# Patient Record
Sex: FEMALE | Race: WHITE | NOT HISPANIC OR LATINO | Employment: FULL TIME | URBAN - METROPOLITAN AREA
[De-identification: names, ages, dates, MRNs, and addresses within clinical notes are randomized per-mention and may not be internally consistent; named-entity substitution may affect disease eponyms.]

---

## 2017-02-27 ENCOUNTER — GENERIC CONVERSION - ENCOUNTER (OUTPATIENT)
Dept: OTHER | Facility: OTHER | Age: 52
End: 2017-02-27

## 2017-05-01 ENCOUNTER — ALLSCRIPTS OFFICE VISIT (OUTPATIENT)
Dept: OTHER | Facility: OTHER | Age: 52
End: 2017-05-01

## 2017-10-31 ENCOUNTER — ALLSCRIPTS OFFICE VISIT (OUTPATIENT)
Dept: OTHER | Facility: OTHER | Age: 52
End: 2017-10-31

## 2017-10-31 ENCOUNTER — GENERIC CONVERSION - ENCOUNTER (OUTPATIENT)
Dept: OTHER | Facility: OTHER | Age: 52
End: 2017-10-31

## 2017-11-15 ENCOUNTER — ALLSCRIPTS OFFICE VISIT (OUTPATIENT)
Dept: OTHER | Facility: OTHER | Age: 52
End: 2017-11-15

## 2017-11-16 ENCOUNTER — GENERIC CONVERSION - ENCOUNTER (OUTPATIENT)
Dept: OTHER | Facility: OTHER | Age: 52
End: 2017-11-16

## 2017-11-16 LAB
A/G RATIO (HISTORICAL): 1.6 (ref 1.2–2.2)
ALBUMIN SERPL BCP-MCNC: 4.5 G/DL (ref 3.5–5.5)
ALP SERPL-CCNC: 85 IU/L (ref 39–117)
ALT SERPL W P-5'-P-CCNC: 33 IU/L (ref 0–32)
AST SERPL W P-5'-P-CCNC: 23 IU/L (ref 0–40)
BILIRUB SERPL-MCNC: 0.4 MG/DL (ref 0–1.2)
BUN SERPL-MCNC: 15 MG/DL (ref 6–24)
BUN/CREA RATIO (HISTORICAL): 22 (ref 9–23)
CALCIUM SERPL-MCNC: 9.8 MG/DL (ref 8.7–10.2)
CHLORIDE SERPL-SCNC: 98 MMOL/L (ref 96–106)
CHOLEST SERPL-MCNC: 200 MG/DL (ref 100–199)
CO2 SERPL-SCNC: 24 MMOL/L (ref 18–29)
CREAT SERPL-MCNC: 0.69 MG/DL (ref 0.57–1)
DEPRECATED RDW RBC AUTO: 13.7 % (ref 12.3–15.4)
EGFR AFRICAN AMERICAN (HISTORICAL): 116 ML/MIN/1.73
EGFR-AMERICAN CALC (HISTORICAL): 100 ML/MIN/1.73
GLUCOSE SERPL-MCNC: 123 MG/DL (ref 65–99)
HCT VFR BLD AUTO: 43.4 % (ref 34–46.6)
HDLC SERPL-MCNC: 59 MG/DL
HGB BLD-MCNC: 14.7 G/DL (ref 11.1–15.9)
INTERPRETATION (HISTORICAL): NORMAL
LDLC SERPL CALC-MCNC: 94 MG/DL (ref 0–99)
MCH RBC QN AUTO: 30.8 PG (ref 26.6–33)
MCHC RBC AUTO-ENTMCNC: 33.9 G/DL (ref 31.5–35.7)
MCV RBC AUTO: 91 FL (ref 79–97)
PLATELET # BLD AUTO: 355 X10E3/UL (ref 150–379)
POTASSIUM SERPL-SCNC: 4.6 MMOL/L (ref 3.5–5.2)
RBC (HISTORICAL): 4.78 X10E6/UL (ref 3.77–5.28)
SODIUM SERPL-SCNC: 137 MMOL/L (ref 134–144)
TOT. GLOBULIN, SERUM (HISTORICAL): 2.9 G/DL (ref 1.5–4.5)
TOTAL PROTEIN (HISTORICAL): 7.4 G/DL (ref 6–8.5)
TRIGL SERPL-MCNC: 237 MG/DL (ref 0–149)
WBC # BLD AUTO: 10.2 X10E3/UL (ref 3.4–10.8)

## 2017-11-16 NOTE — CONSULTS
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)1      1 Amended By: Isidro Reza; Nov 15 2017 4:10 PM EST    Plan     · Start: Meloxicam 7 5 MG Oral Tablet; TAKE 1 TABLET DAILY1    · Follow-up PRN Evaluation and Treatment  Follow-up  Status: Complete  Done:84Ehl2297 04:10PM1    · We recommend that you stretch your plantar fascia using a step or stair  Do this [id number] times in a row, 3 times a day , and hold the stretch for 10 seconds eachtime ; Status:Complete;   Done: 86AIN8450 04:10PM1    · Wear shoes that give your toes plenty of room ; Status:Complete;   Done: 19Myb588637:10PM1    · Orthotics B/L; Status:Complete;   Done: 87AJL6305 04:10PM1      1 Amended By: Isidro Reza; Nov 15 2017 4:10 PM EST    Discussion/Summary  The  patient1  ,  patient's family1  was counseled regarding1  diagnostic results1 ,-- instructions for management1 ,-- prognosis1 ,-- patient and family education1 ,-- risks and benefits of treatment options1 ,-- importance of compliance with treatment1   Patient is able to Self-Care1    Possible side effects of new medications were reviewed with the patient/guardian today1  The treatment plan was reviewed with the patient/guardian  The patient/guardian understands and agrees with the treatment plan1        1 Amended By: Isidro Reza; Nov 15 2017 4:11 PM EST    Chief Complaint  B/L foot/heel pain      History of Present Illness  HPI: Patient has bilateral heel pain  No history of trauma  Patient suffers from post static dyskinesia  She does have not have low back pain  1        1 Amended By: Isidro Reza; Nov 15 2017 4:08 PM EST    Review of Systems   Constitutional:1  No fever, no chills, feels well, no tiredness, no recent weight gain or weight loss1   Eyes:1  No complaints of eye pain, no red eyes, no eyesight problems, no discharge, no dry eyes, no itching of eyes1   ENT:1  no complaints of earache, no loss of hearing, no nose bleeds, no nasal discharge, no sore throat, no hoarseness1   Cardiovascular:1  No complaints of slow heart rate, no fast heart rate, no chest pain, no palpitations, no leg claudication, no lower extremity edema1   Respiratory:1  No complaints of shortness of breath, no wheezing, no cough, no SOB on exertion, no orthopnea, no PND1   Gastrointestinal:1  No complaints of abdominal pain, no constipation, no nausea or vomiting, no diarrhea, no bloody stools1   Genitourinary:1  No complaints of dysuria, no incontinence, no pelvic pain, no dysmenorrhea, no vaginal discharge or bleeding1   Musculoskeletal:1  No complaints of arthralgias, no myalgias, no joint swelling or stiffness, no limb pain or swelling1   Integumentary:1  No complaints of skin rash or lesions, no itching, no skin wounds, no breast pain or lump1   Neurological:1  No complaints of headache, no confusion, no convulsions, no numbness, no dizziness or fainting, no tingling, no limb weakness, no difficulty walking1   Psychiatric:1  Not suicidal, no sleep disturbance, no anxiety or depression, no change in personality, no emotional problems1   Endocrine:1  No complaints of proptosis, no hot flashes, no muscle weakness, no deepening of the voice, no feelings of weakness1   Hematologic/Lymphatic:1  No complaints of swollen glands, no swollen glands in the neck, does not bleed easily, does not bruise easily1         1 Amended By: Paul Boyd; Nov 15 2017 3:58 PM EST    Active Problems    1  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 99) (M12 9)  2  BMI 38 0-38 9,adult (V85 38) (Z68 38)  3  Colon cancer screening (V76 51) (Z12 11)  4  Decreased libido (799 81) (R68 82)  5   Encounter for colorectal cancer screening (V76 51) (Z12 11,Z12 12)  6  Encounter for PPD test (V74 1) (Z11 1)  7  Encounter for screening mammogram for malignant neoplasm of breast (V76 12) (Z12 31)  8  Hypercholesterolemia (272 0) (E78 00)  9  Impaired fasting glucose (790 21) (R73 01)  10  Need for DTaP vaccination (V06 1) (Z23)  11  Nephrolithiasis (592 0) (N20 0)  12  Nicotine dependence (305 1) (F17 200)  13  Obesity, Class II, BMI 35-39 9 (278 00) (E66 9)  14  Pelvic pain in female (625 9) (R10 2)  15  Well woman exam with routine gynecological exam (V72 31) (Z01 419)    Past Medical History   · Acute upper respiratory infection (465 9) (J06 9)   · History of Apnea (786 03) (R06 81)   · History of Arthralgia (719 40) (M25 50)   · History of Benign essential hypertension (401 1) (I10)   · History of Depression (311) (F32 9)   · History of Difficulty breathing (786 09) (R06 89)   · History of Dysmenorrhea (625 3) (N94 6)   · Exposure to potentially hazardous body fluids (V15 85) (Z77 21)   · History of Fatigue (780 79) (R53 83)   · History of acute bronchitis (V12 69) (Z87 09)   · History of Insomnia (780 52) (G47 00)   · History of Muscle weakness (generalized) (728 87) (M62 81)   · History of Screening for cardiovascular condition (V81 2) (Z13 6)   · History of Screening for diabetes mellitus (V77 1) (Z13 1)   · History of Screening for hyperlipidemia (V77 91) (Z13 220)   · History of Viral gastroenteritis (008 8) (A08 4)    Surgical History   · History of Appendectomy   · History of Tubal Ligation    Family History  Mother    · Family history of diabetes mellitus (V18 0) (Z83 3)   · Family history of Fibromyalgia  Father    · Family history of Hypertension  Grandfather    · Family history of stroke (V17 1) (Z82 3)    Social History   · Denied: History of Alcohol use   · Denied: History of Drug use   · Former smoker (V15 82) (B13 912)    Current Meds  1  No Reported Medications Recorded    Allergies  1  No Known Drug Allergies  2   No Known Environmental Allergies  3  No Known Food Allergies    Vitals   Recorded: 32PLU9894 03:48PM   Heart Rate 78   Respiration 17   Systolic 751   Diastolic 89   Height 5 ft 2 in   Weight 206 lb    BMI Calculated 37 68   BSA Calculated 1 94       Physical Exam  Left Foot: Appearance:  Normal except as noted:1  1  excessive pronation1 -- and-- pes planus1   Tenderness: 1  medial calcaneous1 -- and-- insertion of the plantar fascia1   Right Foot: Appearance:  Normal except as noted:1  1  excessive pronation1 -- and-- pes planus1   Tenderness:  None except the1  1  medial calcaneous1 -- and-- insertion of the plantar fascia1   Left Ankle: ROM:1  limited ROM in all planes1    Right Ankle: ROM:1  limited ROM in all planes1    Neurological Exam:1  Performed1   Light touch was1  intact bilaterally1   Vibratory sensation was1  intact bilaterally1   Response to monofilament test was1  intact bilaterally1   Deep tendon reflexes:1  patellar reflex present bilaterally1 -- and-- achilles reflex present bilaterally1   Vascular Exam:1  performed1  Dorsalis pedis pulses were1  present bilaterally1   Posterior tibial pulses were1  present bilaterally1   Elevation Pallor:1  absent bilaterally1   Dependence rubor was1  absent bilaterally1   Edema:1  none1   Toenails: All of the toenails were1  elongated1 ,-- hypertrophied1 -- and-- discolored1   Hyperkeratosis:1  present on both first toes1   Shoe Gear Evaluation:1  Performed ()7   Recommendation(s):1  custom inlays1 -- and-- athletic shoes1         1 Amended By: John Sy; Nov 15 2017 4:09 PM EST    Results/Data  I personally reviewed the films/images/results in the office today  My interpretation follows  1   X-ray Review1  Bilateral calcaneal spurring noted1         1 Amended By: John Sy; Nov 15 2017 4:09 PM EST    Procedure  X-rays taken  Bilateral heel trigger point injection done  1 25 cc of Kenalog 10 was injected without complication  Patient will stretch daily   She will be placed on Mobic   Her feet have been casted for custom molded foot orthotics1        1 Amended By: Davin Chu; Nov 15 2017 4:09 PM EST    Signatures   Electronically signed by : Ismael Bell DPM; Nov 15 2017  4:11PM EST                       (Author)

## 2017-11-17 ENCOUNTER — ALLSCRIPTS OFFICE VISIT (OUTPATIENT)
Dept: OTHER | Facility: OTHER | Age: 52
End: 2017-11-17

## 2017-11-17 ENCOUNTER — GENERIC CONVERSION - ENCOUNTER (OUTPATIENT)
Dept: OTHER | Facility: OTHER | Age: 52
End: 2017-11-17

## 2017-11-17 DIAGNOSIS — Z12.31 ENCOUNTER FOR SCREENING MAMMOGRAM FOR MALIGNANT NEOPLASM OF BREAST: ICD-10-CM

## 2017-11-18 LAB
T4 FREE SERPL-MCNC: 0.92 NG/DL (ref 0.82–1.77)
TESTOSTERONE TOTAL (HISTORICAL): 54 NG/DL (ref 3–41)
TSH SERPL DL<=0.05 MIU/L-ACNC: 3.17 UIU/ML (ref 0.45–4.5)

## 2017-11-18 NOTE — PROGRESS NOTES
Assessment    1  Impaired fasting glucose (790 21) (R73 01)  2  Hypercholesterolemia (272 0) (E78 00)  3  Obesity, Class II, BMI 35-39 9 (278 00) (E66 9)  4  Former smoker (V15 82) (G59 886)  5  Fatigue (780 79) (R53 83)  6  Hirsutism (704 1) (L68 0)  7  Screening mammogram, encounter for (V76 12) (Z12 31)1   8  Colon cancer screening (V76 51) (Z12 11)1      1 Amended By: Reese Beltrán ; Nov 17 2017 1:55 PM EST    Plan  Colon cancer screening    · COLONOSCOPY; Status:Active; Requested for:17Nov2017; 1   Fatigue    · (1) T4, FREE; Status:Active; Requested for:17Nov2017;    · (1) TSH; Status:Active; Requested for:17Nov2017; Hirsutism    · (1) DHEA; Status:Active; Requested for:17Nov2017;    · (1) TESTOSTERONE; Status:Active; Requested for:17Nov2017;   Impaired fasting glucose    · Start: MetFORMIN HCl - 500 MG Oral Tablet; Take 1 tablet daily   · Begin a limited exercise program ; Status:Complete;   Done: 46YIA8130   · We recommend that you bring your body mass index down to 26 ; Status:Complete;  Done: 66EAP0658   · Follow-up visit in 6 weeks Evaluation and Treatment  Follow-up  Status: Complete - Scheduling  Done: 24YFB4031 01:53PM1   Screening mammogram, encounter for    · * MAMMO SCREENING BILATERAL W CAD; Status:Hold For - Scheduling; Requestedfor:17Nov2017; 1      1 Amended By: Reese Beltrán ; Nov 17 2017 1:55 PM EST    Discussion/Summary    Impaired fasting glucose - fasting sugar is up to 123; metformin started- total cholesterol is down from 213 to 200 - unchanged  will work on diet and exercise  pressure is elevated due to anti-inflammatory will recheck pressure in 6 weeks  Chief Complaint  follow up for weight problems      History of Present Illness  She has been on an anti-inflammatory for gout  Active Problems    1  Acquired ankle/foot deformity (736 70) (M21 969)  2  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 99) (M12 9)  3  BMI 38 0-38 9,adult (V85 38) (Z68 38)  4   Colon cancer screening (V76 51) (Z12 11)  5  Decreased libido (799 81) (R68 82)  6  Difficulty walking (719 7) (R26 2)  7  Disorder of nail (703 9) (L60 9)  8  Encounter for colorectal cancer screening (V76 51) (Z12 11,Z12 12)  9  Encounter for screening mammogram for malignant neoplasm of breast (V76 12) (Z12 31)  10  Foot pain, bilateral (729 5) (M79 671,M79 672)  11  Hypercholesterolemia (272 0) (E78 00)  12  Impaired fasting glucose (790 21) (R73 01)  13  Nephrolithiasis (592 0) (N20 0)  14  Nicotine dependence (305 1) (F17 200)  15  Obesity, Class II, BMI 35-39 9 (278 00) (E66 9)  16  Pelvic pain in female (625 9) (R10 2)  17  Pes planus, congenital (754 61) (Q66 50)  18  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)  19  Well woman exam with routine gynecological exam (V72 31) (Z01 419)    Past Medical History  1  Acute upper respiratory infection (465 9) (J06 9)  2  History of Apnea (786 03) (R06 81)  3  History of Arthralgia (719 40) (M25 50)  4  History of Benign essential hypertension (401 1) (I10)  5  History of Depression (311) (F32 9)  6  History of Difficulty breathing (786 09) (R06 89)  7  History of Dysmenorrhea (625 3) (N94 6)  8  Exposure to potentially hazardous body fluids (V15 85) (Z77 21)  9  History of Fatigue (780 79) (R53 83)  10  History of acute bronchitis (V12 69) (Z87 09)  11  History of Insomnia (780 52) (G47 00)  12  History of Muscle weakness (generalized) (728 87) (M62 81)  13  History of Screening for cardiovascular condition (V81 2) (Z13 6)  14  History of Screening for diabetes mellitus (V77 1) (Z13 1)  15  History of Screening for hyperlipidemia (V77 91) (Z13 220)  16  History of Viral gastroenteritis (008 8) (A08 4)    Surgical History  1  History of Appendectomy  2  History of Tubal Ligation    Family History  Mother   1  Family history of diabetes mellitus (V18 0) (Z83 3)  2  Family history of Fibromyalgia  Father   3  Family history of Hypertension  Grandfather   4   Family history of stroke (V17 1) (Z82 3)    Social History   · Denied: History of Alcohol use   · Denied: History of Drug use   · Former smoker (V15 82) (V38 485)  The social history was reviewed and updated today  Current Meds  1  No Reported Medications Recorded    Allergies  1  No Known Drug Allergies  2  No Known Environmental Allergies  3  No Known Food Allergies    Vitals  Vital Signs    Recorded: 19RQN9593 01:44PM Recorded: 93NPX8306 01:11PM   Temperature  97 7 F   Heart Rate  84   Respiration  20   Systolic 463 980   Diastolic 85 913   Height  5 ft 2 in   Weight  206 lb    BMI Calculated  37 68   BSA Calculated  1 94       Physical Exam   Constitutional  General appearance: No acute distress, well appearing and well nourished  Ears, Nose, Mouth, and Throat  External inspection of ears and nose: Normal    Otoscopic examination: Tympanic membranes translucent with normal light reflex  Canals patent without erythema  Oropharynx: Normal with no erythema, edema, exudate or lesions  Pulmonary  Auscultation of lungs: Clear to auscultation  no rales or crackles were heard bilaterally  no rhonchi  no friction rub  no wheezing  Cardiovascular  Auscultation of heart: Normal rate and rhythm, normal S1 and S2, without murmurs     Musculoskeletal  Gait and station: Normal          Results/Data  (1) COMPREHENSIVE METABOLIC PANEL [id number] 60:87MX Caterina Mage     Test Name Result Flag Reference   Glucose, Serum 123 mg/dL H 65-99   BUN 15 mg/dL  6-24   Creatinine, Serum 0 69 mg/dL  0 57-1 00   BUN/Creatinine Ratio 22  9-23   Sodium, Serum 137 mmol/L  134-144   Potassium, Serum 4 6 mmol/L  3 5-5 2   Chloride, Serum 98 mmol/L     Carbon Dioxide, Total 24 mmol/L  18-29   Calcium, Serum 9 8 mg/dL  8 7-10 2   Protein, Total, Serum 7 4 g/dL  6 0-8 5   Albumin, Serum 4 5 g/dL  3 5-5 5   Globulin, Total 2 9 g/dL  1 5-4 5   A/G Ratio 1 6  1 2-2 2   Bilirubin, Total 0 4 mg/dL  0 0-1 2   Alkaline Phosphatase, S 85 IU/L     AST (SGOT) 23 IU/L 0-40   ALT (SGPT) 33 IU/L H 0-32   eGFR If NonAfricn Am 100 mL/min/1 73  >59   eGFR If Africn Am 116 mL/min/1 73  >59     (1) LIPID PANEL FASTING W DIRECT LDL REFLEX [id number] 07:08AM [facility]     Test Name Result Flag Reference   Cholesterol, Total 200 mg/dL H 100-199   Triglycerides 237 mg/dL H 0-149   HDL Cholesterol 59 mg/dL  >39   LDL Cholesterol Calc 94 mg/dL  0-99     (1) CBC/ PLT (NO DIFF) [id number] 07:08AM [facility]     Test Name Result Flag Reference   WBC 10 2 x10E3/uL  3 4-10 8   RBC 4 78 x10E6/uL  3 77-5 28   Hemoglobin 14 7 g/dL  11 1-15 9   Hematocrit 43 4 %  34 0-46  6   MCV 91 fL  79-97   MCH 30 8 pg  26 6-33 0   MCHC 33 9 g/dL  31 5-35 7   RDW 13 7 %  12 3-15 4   Platelets 496 [id number]/KB  150-379     (1) COMPREHENSIVE METABOLIC PANEL [id number] 06:68YL [facility]     Test Name Result Flag Reference   Glucose, Serum 123 mg/dL H 65-99   BUN 15 mg/dL  6-24   Creatinine, Serum 0 69 mg/dL  0 57-1 00   BUN/Creatinine Ratio 22  9-23   Sodium, Serum 137 mmol/L  134-144   Potassium, Serum 4 6 mmol/L  3 5-5 2   Chloride, Serum 98 mmol/L     Carbon Dioxide, Total 24 mmol/L  18-29   Calcium, Serum 9 8 mg/dL  8 7-10 2   Protein, Total, Serum 7 4 g/dL  6 0-8 5   Albumin, Serum 4 5 g/dL  3 5-5 5   Globulin, Total 2 9 g/dL  1 5-4 5   A/G Ratio 1 6  1 2-2 2   Bilirubin, Total 0 4 mg/dL  0 0-1 2   Alkaline Phosphatase, S 85 IU/L     AST (SGOT) 23 IU/L  0-40   ALT (SGPT) 33 IU/L H 0-32   eGFR If NonAfricn Am 100 mL/min/1 73  >59   eGFR If Africn Am 116 mL/min/1 73  >59     (1) LIPID PANEL FASTING W DIRECT LDL REFLEX [id number] 07:08AM [facility]     Test Name Result Flag Reference   Cholesterol, Total 200 mg/dL H 100-199   Triglycerides 237 mg/dL H 0-149   HDL Cholesterol 59 mg/dL  >39   LDL Cholesterol Calc 94 mg/dL  0-99     (1) CBC/ PLT (NO DIFF) [id number] 07:08AM [facility]     Test Name Result Flag Reference   WBC 10 2 x10E3/uL  3 4-10 8   RBC 4 78 x10E6/uL  3 77-5 28   Hemoglobin 14 7 g/dL  11 1-15 9   Hematocrit 43 4 %  34 0-46  6   MCV 91 fL  79-97   MCH 30 8 pg  26 6-33 0   MCHC 33 9 g/dL  31 5-35 7   RDW 13 7 %  12 3-15 4   Platelets 898 [id number]/QZ  150-379       Signatures   Electronically signed by : Demetra Matos DO; Nov 17 2017  1:52PM EST                       (Author)    Electronically signed by : Demetra Matos DO; Nov 17 2017  1:55PM EST                       (Author)

## 2017-11-21 ENCOUNTER — GENERIC CONVERSION - ENCOUNTER (OUTPATIENT)
Dept: OTHER | Facility: OTHER | Age: 52
End: 2017-11-21

## 2017-11-21 LAB — DHEA (HISTORICAL): 49 NG/DL (ref 31–701)

## 2017-12-28 ENCOUNTER — ALLSCRIPTS OFFICE VISIT (OUTPATIENT)
Dept: OTHER | Facility: OTHER | Age: 52
End: 2017-12-28

## 2017-12-29 NOTE — PROGRESS NOTES
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)   5  Achilles tendinitis of right lower extremity (726 71) (M76 61)    Plan    · Sulindac 200 MG Oral Tablet; TAKE 1 TABLET 2 TIMES DAILY AFTER MEALS   · Follow-up PRN Evaluation and Treatment  Follow-up  Status: Complete  Done:    67MBB2992 04:19PM   · We recommend that you stretch your plantar fascia using a step or stair  Do this exercise    10 times in a row, 3 times a day , and hold the stretch for 10 seconds each    time ; Status:Complete;   Done: 31DTL4347 04:19PM   · Orthotics B/L; Status:Complete;   Done: 62TVD0378 04:19PM   · Strapping ankle and or foot - POC; Status:Active - Perform Order; Requested    for:11Try3891;     Discussion/Summary   The patient was counseled regarding diagnostic results,-- instructions for management,-- prognosis,-- patient and family education,-- risks and benefits of treatment options,-- importance of compliance with treatment  Patient is able to Self-Care  Possible side effects of new medications were reviewed with the patient/guardian today  The treatment plan was reviewed with the patient/guardian  The patient/guardian understands and agrees with the treatment plan      Chief Complaint   Right foot pain      History of Present Illness   HPI: Patient has continued pain in her right heel only  Patient has post static dyskinesia  She now has pain in the back of the heel as well  No history of trauma      Review of Systems        Constitutional: No fever, no chills, feels well, no tiredness, no recent weight gain or weight loss  Eyes: No complaints of eye pain, no red eyes, no eyesight problems, no discharge, no dry eyes, no itching of eyes  ENT: no complaints of earache, no loss of hearing, no nose bleeds, no nasal discharge, no sore throat, no hoarseness  Cardiovascular: No complaints of slow heart rate, no fast heart rate, no chest pain, no palpitations, no leg claudication, no lower extremity edema  Respiratory: No complaints of shortness of breath, no wheezing, no cough, no SOB on exertion, no orthopnea, no PND  Gastrointestinal: No complaints of abdominal pain, no constipation, no nausea or vomiting, no diarrhea, no bloody stools  Genitourinary: No complaints of dysuria, no incontinence, no pelvic pain, no dysmenorrhea, no vaginal discharge or bleeding  Musculoskeletal: No complaints of arthralgias, no myalgias, no joint swelling or stiffness, no limb pain or swelling  Integumentary: No complaints of skin rash or lesions, no itching, no skin wounds, no breast pain or lump  Neurological: No complaints of headache, no confusion, no convulsions, no numbness, no dizziness or fainting, no tingling, no limb weakness, no difficulty walking  Psychiatric: Not suicidal, no sleep disturbance, no anxiety or depression, no change in personality, no emotional problems  Endocrine: No complaints of proptosis, no hot flashes, no muscle weakness, no deepening of the voice, no feelings of weakness  Hematologic/Lymphatic: No complaints of swollen glands, no swollen glands in the neck, does not bleed easily, does not bruise easily  Active Problems   1  Acquired ankle/foot deformity (736 70) (M21 969)   2  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 99) (M12 9)   3  BMI 38 0-38 9,adult (V85 38) (Z68 38)   4  Colon cancer screening (V76 51) (Z12 11)   5  Decreased libido (799 81) (R68 82)   6  Difficulty walking (719 7) (R26 2)   7  Disorder of nail (703 9) (L60 9)   8  Encounter for colorectal cancer screening (V76 51) (Z12 11,Z12 12)   9  Encounter for screening mammogram for malignant neoplasm of breast (V76 12)     (Z12 31)   10  Fatigue (780 79) (R53 83)   11  Foot pain, bilateral (729 5) (M79 671,M79 672)   12   Hirsutism (704 1) (L68 0) 13  Hypercholesterolemia (272 0) (E78 00)   14  Impaired fasting glucose (790 21) (R73 01)   15  Nephrolithiasis (592 0) (N20 0)   16  Nicotine dependence (305 1) (F17 200)   17  Obesity, Class II, BMI 35-39 9 (278 00) (E66 9)   18  Pelvic pain in female (625 9) (R10 2)   19  Pes planus, congenital (754 61) (Q66 50)   20  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)   21  Screening mammogram, encounter for (V76 12) (Z12 31)   22  Well woman exam with routine gynecological exam (V72 31) (Z01 419)    Past Medical History    · Acute upper respiratory infection (465 9) (J06 9)   · History of Apnea (786 03) (R06 81)   · History of Arthralgia (719 40) (M25 50)   · History of Benign essential hypertension (401 1) (I10)   · History of Depression (311) (F32 9)   · History of Difficulty breathing (786 09) (R06 89)   · History of Dysmenorrhea (625 3) (N94 6)   · Exposure to potentially hazardous body fluids (V15 85) (Z77 21)   · History of Fatigue (780 79) (R53 83)   · History of acute bronchitis (V12 69) (Z87 09)   · History of Insomnia (780 52) (G47 00)   · History of Muscle weakness (generalized) (728 87) (M62 81)   · History of Screening for cardiovascular condition (V81 2) (Z13 6)   · History of Screening for diabetes mellitus (V77 1) (Z13 1)   · History of Screening for hyperlipidemia (V77 91) (Z13 220)   · History of Viral gastroenteritis (008 8) (A08 4)    Surgical History    · History of Appendectomy   · History of Tubal Ligation    Family History   Mother    · Family history of diabetes mellitus (V18 0) (Z83 3)   · Family history of Fibromyalgia  Father    · Family history of Hypertension  Grandfather    · Family history of stroke (V17 1) (Z82 3)    Social History    · Denied: History of Alcohol use   · Denied: History of Drug use   · Former smoker (V15 82) (D15 437)    Current Meds    1  MetFORMIN HCl - 500 MG Oral Tablet; Take 1 tablet daily;      Therapy: 92THR3428 to (Evaluate:17Mar2018)  Requested for: 69EJA1692; Last Rx:15Jwv7573 Ordered    Allergies   1  No Known Drug Allergies  2  No Known Environmental Allergies   3  No Known Food Allergies    Vitals    Recorded: 90Aep6411 03:50PM   Respiration 17   Systolic 658   Diastolic 87   Height 5 ft 2 in   Weight 206 lb    BMI Calculated 37 68   BSA Calculated 1 94     Physical Exam   Left Foot: Appearance: Normal except as noted: excessive pronation-- and-- pes planus  Tenderness: medial calcaneous-- and-- insertion of the plantar fascia  Right Foot: Appearance: Normal except as noted: excessive pronation-- and-- pes planus  Tenderness: None except the medial calcaneous-- and-- insertion of the plantar fascia  Left Ankle: ROM: limited ROM in all planes    Right Ankle: Tenderness: None except the Achilles tendon insertion  ROM: limited ROM in all planes Motor: Normal     Neurological Exam: performed  Light touch was intact bilaterally  Vibratory sensation was intact bilaterally  Response to monofilament test was intact bilaterally  Deep tendon reflexes: patellar reflex present bilaterally-- and-- achilles reflex present bilaterally  Vascular Exam: performed Dorsalis pedis pulses were present bilaterally  Posterior tibial pulses were present bilaterally  Elevation Pallor: absent bilaterally  Dependence rubor was absent bilaterally  Edema: none  Toenails: All of the toenails were elongated,-- hypertrophied-- and-- discolored  Hyperkeratosis: present on both first toes  Shoe Gear Evaluation: performed ()  Recommendation(s): custom inlays-- and-- athletic shoes  Results/Data   I personally reviewed the films/images/results in the office today  My interpretation follows  X-ray Review Early calcaneal spurring noted  Both plantar and retrocalcaneal spurring noted of the right calcaneus  No evidence of fracture or bony mass  Procedure   X-rays taken  Right heel trigger point injection done   1 25 cc of Kenalog 10 was injected into right heel without complication  Patient was dispensed a night splint  Patient will stretch daily  We will add anti-inflammatory medicine        Future Appointments      Date/Time Provider Specialty Site   01/08/2018 06:15 PM Jenny Patel, [address] [place] [address]     Signatures    Electronically signed by : Marlin Meyer DPM; Dec 28 2017  4:20PM EST                       (Author)

## 2018-01-08 ENCOUNTER — GENERIC CONVERSION - ENCOUNTER (OUTPATIENT)
Dept: OTHER | Facility: OTHER | Age: 53
End: 2018-01-08

## 2018-01-09 ENCOUNTER — GENERIC CONVERSION - ENCOUNTER (OUTPATIENT)
Dept: OTHER | Facility: OTHER | Age: 53
End: 2018-01-09

## 2018-01-09 NOTE — RESULT NOTES
Verified Results  [facility]) Measles/Mumps/Rubella Immunity [id number] 07:32AM Adityamaryan Handy     Test Name Result Flag Reference   Rubella Antibodies, IgG 24 10 index  Immune >0 99   Non-immune       <0 90                                                 Equivocal  0 90 - 0 99                                                 Immune           >0 99   Rubeola Ab, IgG 96 5 AU/mL  Immune >29 9   Negative        <25 0                                                  Equivocal 25 0 - 29 9                                                  Positive        >29 9                 Presence of antibodies to Rubeola is presumptive evidence                 of immunity except when acute infection is suspected  Mumps Abs, IgG 9 1 AU/mL L Immune >10 9   A second sample should be collected and tested no less than 2-4 weeks  Negative         <9 0                                                 Equivocal  9 0 - 10 9                                                 Positive        >10 9                 A positive result generally indicates past exposure to                 Mumps virus or previous vaccination

## 2018-01-10 ENCOUNTER — TRANSCRIBE ORDERS (OUTPATIENT)
Dept: ADMINISTRATIVE | Facility: HOSPITAL | Age: 53
End: 2018-01-10

## 2018-01-10 DIAGNOSIS — R79.89 ELEVATED TESTOSTERONE LEVEL IN FEMALE: Primary | ICD-10-CM

## 2018-01-11 NOTE — RESULT NOTES
Verified Results  (1) COMPREHENSIVE METABOLIC PANEL [id number] 28:92MG Johana Brittly     Test Name Result Flag Reference   Glucose, Serum 109 mg/dL H 65-99   BUN 16 mg/dL  6-24   Creatinine, Serum 0 84 mg/dL  0 57-1 00   eGFR If NonAfricn Am 81 mL/min/1 73  >59   eGFR If Africn Am 93 mL/min/1 73  >59   BUN/Creatinine Ratio 19  9-23   Sodium, Serum 141 mmol/L  134-144   Potassium, Serum 4 5 mmol/L  3 5-5 2   Chloride, Serum 101 mmol/L     Carbon Dioxide, Total 22 mmol/L  18-29   Calcium, Serum 9 2 mg/dL  8 7-10 2   Protein, Total, Serum 7 3 g/dL  6 0-8 5   Albumin, Serum 4 2 g/dL  3 5-5 5   Globulin, Total 3 1 g/dL  1 5-4 5   A/G Ratio 1 4  1 1-2 5   Bilirubin, Total 0 4 mg/dL  0 0-1 2   Alkaline Phosphatase, S 76 IU/L     AST (SGOT) 18 IU/L  0-40   ALT (SGPT) 25 IU/L  0-32     (1) LIPID PANEL FASTING W DIRECT LDL REFLEX [id number] 07:32AM Volfabiana Velázquez     Test Name Result Flag Reference   Cholesterol, Total 213 mg/dL H 100-199   Triglycerides 186 mg/dL H 0-149   HDL Cholesterol 47 mg/dL  >39   According to ATP-III Guidelines, HDL-C >59 mg/dL is considered a  negative risk factor for CHD  LDL Cholesterol Calc 129 mg/dL H 0-99     [facility]) Cardiovascular Risk Assessment 06Jun2016 07:32AM Volfabiana [facility]     Test Name Result Flag Reference   Interpretation Note     Supplement report is available  PDF Image   Discussion/Summary   Darya,   Please make an appointment to discuss your lab work  Your blood sugar is elevated  It is in the pre-diabetic range      Dr Selam Lau

## 2018-01-12 NOTE — PROGRESS NOTES
Chief Complaint  PPD placement      Active Problems    1  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 99) (M12 9)   2  Colon cancer screening (V76 51) (Z12 11)   3  Decreased libido (799 81) (R68 82)   4  Encounter for screening mammogram for malignant neoplasm of breast (V76 12)   (Z12 31)   5  Hypercholesterolemia (272 0) (E78 00)   6  Impaired fasting glucose (790 21) (R73 01)   7  Nephrolithiasis (592 0) (N20 0)   8  Nicotine dependence (305 1) (F17 200)   9  Pelvic pain in female (625 9) (R10 2)   10  Well woman exam with routine gynecological exam (V72 31) (Z01 419)    Current Meds   1  No Reported Medications Recorded    Allergies    1  No Known Drug Allergies    2  No Known Environmental Allergies   3   No Known Food Allergies    Plan  Encounter for PPD test    · Tubersol 5 UNIT/0 1ML Intradermal Solution    Future Appointments    Date/Time Provider Specialty Site   11/09/2016 09:15 AM 1 Alyx Glez Nurse Mariela  [facility]     Signatures   Electronically signed by : Zoe Davalos DO; Nov 7 2016  9:07AM EST                       (Author)

## 2018-01-15 VITALS
BODY MASS INDEX: 37.91 KG/M2 | SYSTOLIC BLOOD PRESSURE: 147 MMHG | WEIGHT: 206 LBS | RESPIRATION RATE: 17 BRPM | DIASTOLIC BLOOD PRESSURE: 89 MMHG | HEIGHT: 62 IN | HEART RATE: 78 BPM

## 2018-01-15 VITALS
SYSTOLIC BLOOD PRESSURE: 152 MMHG | HEART RATE: 84 BPM | TEMPERATURE: 97.7 F | RESPIRATION RATE: 20 BRPM | BODY MASS INDEX: 37.91 KG/M2 | HEIGHT: 62 IN | WEIGHT: 206 LBS | DIASTOLIC BLOOD PRESSURE: 85 MMHG

## 2018-01-17 NOTE — RESULT NOTES
Discussion/Summary   Darya,   Here is a copy of the lab work we discussed  Dr Bella Morrow      Verified Results  (1) TSH [id number] 02:08PM [facility]     Test Name Result Flag Reference   TSH 3 170 uIU/mL  0 450-4 500     (1) TESTOSTERONE [id number] 02:08PM [facility]     Test Name Result Flag Reference   Testosterone, Serum 54 ng/dL H 3-41     (1) DHEA [id number] 02:08PM [facility]     Test Name Result Flag Reference   Dehydroepiandrosterone (DHEA) 49 ng/dL     Age                                               1 -  5 years    0 -  79                                               6 -  7 years    0 - 110                                               8 - 10 years    0 - 80                                              11 - 12 years    0 - 201                                              15 - 14 years    0 - 318                                              13 - 16 years   44 - 200                                              14 - 23 years   36 - 5                                                  >19 years   32 - 701  This test was developed and its performance characteristics  determined by [facility]  It has not been cleared or approved  by the Food and Drug Administration       [facility]) Thyroxine (T4) Free, Direct, S [id number] 02:08PM [facility]     Test Name Result Flag Reference   T4,Free(Direct) 0 92 ng/dL  0 82-1 77

## 2018-01-17 NOTE — PROGRESS NOTES
Assessment    1  Encounter for preventive health examination (V70 0) (Z00 00)   2  BMI 38 0-38 9,adult (V85 38) (Z68 38)   3  Former smoker (V15 82) (Y20 085)   4  History of Appendectomy   5  History of Tubal Ligation   6  Need for DTaP vaccination (V06 1) (Z23)   7  Colon cancer screening (V76 51) (Z12 11)   8  Encounter for colorectal cancer screening (V76 51) (Z12 11,Z12 12)    Plan  Encounter for colorectal cancer screening    · COLONOSCOPY; Status:Active; Requested for:43Vjp6292;    · 1 Segun Watkins MD, Cristi Thibodeaux (Gastroenterology) Co-Management  *  Status: Active   Requested for: 34CQK0753  Care Summary provided  : Yes  Need for DTaP vaccination    · Adacel 5-2-15 5 LF-MCG/0 5 Intramuscular Suspension     Physical examination done  Adacel ordered  Patient concerned for weight and educated on low carbohydrate diet with regular exercise and drink plenty of fluids  Counselled on colorectal screening and prescription given for colonoscopy and gastroenterologist follow up for colonoscopy  Discussion/Summary  health maintenance visit Currently, she eats a healthy diet, eats an adequate diet and has an adequate exercise regimen  The risks and benefits of immunizations were discussed, immunizations are needed and immunizations will be given as outlined in the orders  Advice and education were given regarding nutrition and weight loss  Physical examination done and needed immunization for DTap and administered in the office  Patient educated on weight management  The patient was counseled regarding patient and family education  Immunization Counseling The parent/guardian was counseled on the following vaccine components: DTap  Educated on healthy eating with regular exercise and colorectal screening  The treatment plan was reviewed with the patient/guardian   The patient/guardian understands and agrees with the treatment plan      Chief Complaint  CPe with forms for employer [employer]      History of Present Illness  HM, Adult Female: The patient is being seen for a health maintenance evaluation  General Health: The patient's health since the last visit is described as good  She has regular dental visits  She complains of vision problems  She denies hearing loss  Immunizations status: not up to date  Lifestyle:  She consumes a diverse and healthy diet  She has weight concerns  She exercises regularly  She does not use tobacco  She denies alcohol use  She denies drug use  Reproductive health:  she is not sexually active  Screening:   HPI: Physical exam for new Job  Denies any complaints  Denies tobacco use, alcohol use, and drug abuse  Review of Systems    Constitutional: No fever, no chills, feels well, no tiredness, no recent weight gain or weight loss  Eyes: No complaints of eye pain, no red eyes, no eyesight problems, no discharge, no dry eyes, no itching of eyes  ENT: no complaints of earache, no loss of hearing, no nose bleeds, no nasal discharge, no sore throat, no hoarseness  Cardiovascular: No complaints of slow heart rate, no fast heart rate, no chest pain, no palpitations, no leg claudication, no lower extremity edema  Respiratory: No complaints of shortness of breath, no wheezing, no cough, no SOB on exertion, no orthopnea, no PND  Gastrointestinal: No complaints of abdominal pain, no constipation, no nausea or vomiting, no diarrhea, no bloody stools  Genitourinary: No complaints of dysuria, no incontinence, no pelvic pain, no dysmenorrhea, no vaginal discharge or bleeding  Musculoskeletal: No complaints of arthralgias, no myalgias, no joint swelling or stiffness, no limb pain or swelling  Integumentary: No complaints of skin rash or lesions, no itching, no skin wounds, no breast pain or lump  Neurological: No complaints of headache, no confusion, no convulsions, no numbness, no dizziness or fainting, no tingling, no limb weakness, no difficulty walking     Psychiatric: Not suicidal, no sleep disturbance, no anxiety or depression, no change in personality, no emotional problems  Endocrine: No complaints of proptosis, no hot flashes, no muscle weakness, no deepening of the voice, no feelings of weakness  Hematologic/Lymphatic: No complaints of swollen glands, no swollen glands in the neck, does not bleed easily, does not bruise easily  Active Problems    1  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 99) (M12 9)   2  Colon cancer screening (V76 51) (Z12 11)   3  Decreased libido (799 81) (R68 82)   4  Encounter for PPD test (V74 1) (Z11 1)   5  Encounter for screening mammogram for malignant neoplasm of breast (V76 12)   (Z12 31)   6  Hypercholesterolemia (272 0) (E78 00)   7  Impaired fasting glucose (790 21) (R73 01)   8  Nephrolithiasis (592 0) (N20 0)   9  Nicotine dependence (305 1) (F17 200)   10  Pelvic pain in female (625 9) (R10 2)   11   Well woman exam with routine gynecological exam (V72 31) (Z01 419)    Past Medical History    · Acute upper respiratory infection (465 9) (J06 9)   · History of Apnea (786 03) (R06 81)   · History of Arthralgia (719 40) (M25 50)   · History of Benign essential hypertension (401 1) (I10)   · History of Depression (311) (F32 9)   · History of Difficulty breathing (786 09) (R06 89)   · History of Dysmenorrhea (625 3) (N94 6)   · Exposure to potentially hazardous body fluids (V15 85) (Z77 21)   · History of Fatigue (780 79) (R53 83)   · History of acute bronchitis (V12 69) (Z87 09)   · History of Insomnia (780 52) (G47 00)   · History of Muscle weakness (generalized) (728 87) (M62 81)   · History of Screening for cardiovascular condition (V81 2) (Z13 6)   · History of Screening for diabetes mellitus (V77 1) (Z13 1)   · History of Screening for hyperlipidemia (V77 91) (Z13 220)   · History of Viral gastroenteritis (008 8) (A08 4)    Surgical History    · History of Appendectomy   · History of Tubal Ligation    Family History  Mother    · Family history of diabetes mellitus (V18 0) (Z83 3)   · Family history of Fibromyalgia  Father    · Family history of Hypertension  Grandfather    · Family history of stroke (V17 1) (Z82 3)    Social History    · Denied: History of Alcohol use   · Denied: History of Drug use   · Former smoker (V15 82) (X35 164)    Current Meds   1  No Reported Medications Recorded    Allergies    1  No Known Drug Allergies    2  No Known Environmental Allergies   3  No Known Food Allergies    Vitals   Recorded: 46GTV9755 10:47AM   Temperature 98 5 F   Heart Rate 76   Respiration 20   Systolic 672   Diastolic 92   Height 5 ft 2 in   Weight 208 lb    BMI Calculated 38 04   BSA Calculated 1 94   LMP menopausal     Physical Exam    Constitutional   General appearance: No acute distress, well appearing and well nourished  Eyes   Conjunctiva and lids: No swelling, erythema or discharge  Pupils and irises: Equal, round, reactive to light  Ophthalmoscopic examination: Normal fundi and optic discs  Ears, Nose, Mouth, and Throat   External inspection of ears and nose: Normal     Otoscopic examination: Tympanic membranes translucent with normal light reflex  Canals patent without erythema  Hearing: Normal     Nasal mucosa, septum, and turbinates: Normal without edema or erythema  Lips, teeth, and gums: Normal, good dentition  Oropharynx: Normal with no erythema, edema, exudate or lesions  Neck   Neck: Supple, symmetric, trachea midline, no masses  Thyroid: Normal, no thyromegaly  Pulmonary   Respiratory effort: No increased work of breathing or signs of respiratory distress  Percussion of chest: Normal     Palpation of chest: Normal     Auscultation of lungs: Clear to auscultation  Cardiovascular   Palpation of heart: Normal PMI, no thrills  Auscultation of heart: Normal rate and rhythm, normal S1 and S2, no murmurs  Carotid pulses: 2+ bilaterally      Abdominal aorta: Normal     Femoral pulses: 2+ bilaterally  Pedal pulses: 2+ bilaterally  Examination of extremities for edema and/or varicosities: Normal     Chest   Breasts: Normal, no dimpling or skin changes appreciated  Palpation of breasts and axillae: Normal, no masses palpated  Abdomen   Abdomen: Non-tender, no masses  Liver and spleen: No hepatomegaly or splenomegaly  Examination for hernias: No hernia appreciated  Anus, perineum, and rectum: Normal sphincter tone, no masses, no prolapse  Stool sample for occult blood: Negative  Genitourinary   External genitalia and vagina: Normal, no lesions appreciated  Urethra: Normal, no discharge  Bladder: Not distended, no tenderness  Cervix: Normal, no lesions  Uterus: Normal size, no tenderness, no masses  Adnexa/Parametria: Normal, no masses or tenderness  Lymphatic   Palpation of lymph nodes in neck: No lymphadenopathy  Palpation of lymph nodes in axillae: No lymphadenopathy  Palpation of lymph nodes in groin: No lymphadenopathy  Palpation of lymph nodes in other areas: No lymphadenopathy  Musculoskeletal   Gait and station: Normal     Digits and nails: Normal without clubbing or cyanosis  Joints, bones, and muscles: Normal     Range of motion: Normal     Stability: Normal     Muscle strength/tone: Normal     Skin   Skin and subcutaneous tissue: Normal without rashes or lesions  Palpation of skin and subcutaneous tissue: Normal turgor  Neurologic   Cranial nerves: Cranial nerves II-XII intact  Reflexes: 2+ and symmetric  Sensation: No sensory loss  Psychiatric   Judgment and insight: Normal     Orientation to person, place, and time: Normal     Recent and remote memory: Intact      Mood and affect: Normal        Results/Data  PHQ-2 Adult Depression Screening 91VFV2423 11:09AM Cleo Del Angel     Test Name Result Flag Reference   PHQ-2 Adult Depression Score 0     Over the last two weeks, how often have you been bothered by any of the following problems? Little interest or pleasure in doing things: Not at all - 0  Feeling down, depressed, or hopeless: Not at all - 0   PHQ-2 Adult Depression Screening Negative         Procedure    Procedure:   Results: 20/15 in both eyes without corrective device, 20/20 in the right eye without corrective device, 20/20 in the left eye without corrective device      Attending Note  Collaborating Physician Note: Collaborating Physician: I agree with the Advanced Practitioner note        Signatures   Electronically signed by : Alexey Celestin, Jessy Bello ; May  1 2017 11:35AM EST                       (Author)    Electronically signed by : Nicolas Breaux DO; May  1 2017 12:00PM EST                       (Review)

## 2018-01-18 NOTE — PROGRESS NOTES
Assessment    1  Encounter for preventive health examination (V70 0) (Z00 00)   2  Exposure to potentially hazardous body fluids (V15 85) (Z77 21)    Plan  Exposure to potentially hazardous body fluids    · (LC) Measles/Mumps/Rubella Immunity; Status:Active; Requested [id number]; Health Maintenance    · Begin a limited exercise program ; Status:Complete;   Done: 47PFI8703   · We recommend that you bring your body mass index down to 26 ; Status:Complete;    Done: 27FZM0974  Hypercholesterolemia    · (1) COMPREHENSIVE METABOLIC PANEL; Status:Active; Requested [id number];    · (1) LIPID PANEL FASTING W DIRECT LDL REFLEX; Status:Active; Requested  [id number];     Discussion/Summary  health maintenance visit healthy adult female Currently, she eats an adequate diet and has an inadequate exercise regimen  cervical cancer screening is current Breast cancer screening: mammogram is current  Colorectal cancer screening: colorectal cancer screening is current  Chief Complaint  CPE rmklpn      History of Present Illness  HM, Adult Female: The patient is being seen for a health maintenance evaluation  General Health: The patient's health since the last visit is described as good  Lifestyle:  She does not have a healthy diet  She has weight concerns  She does not exercise regularly  She does not use tobacco    Screening:   HPI: She is working 7 days a week  Review of Systems    Constitutional: No fever, no chills, feels well, no tiredness, no recent weight gain or weight loss  ENT: no complaints of earache, no loss of hearing, no nose bleeds, no nasal discharge, no sore throat, no hoarseness  Cardiovascular: No complaints of slow heart rate, no fast heart rate, no chest pain, no palpitations, no leg claudication, no lower extremity edema  Respiratory: No complaints of shortness of breath, no wheezing, no cough, no SOB on exertion, no orthopnea, no PND     Gastrointestinal: No complaints of abdominal pain, no constipation, no nausea or vomiting, no diarrhea, no bloody stools  Endocrine: No complaints of proptosis, no hot flashes, no muscle weakness, no deepening of the voice, no feelings of weakness  Hematologic/Lymphatic: No complaints of swollen glands, no swollen glands in the neck, does not bleed easily, does not bruise easily  Active Problems    1  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 99) (M12 9)   2  Decreased libido (799 81) (R68 82)   3  Encounter for PPD test (V74 1) (Z11 1)   4  Encounter for screening mammogram for malignant neoplasm of breast (V76 12)   (Z12 31)   5  Hypercholesterolemia (272 0) (E78 0)   6  Nephrolithiasis (592 0) (N20 0)   7  Nicotine dependence (305 1) (F17 200)   8  Pelvic pain in female (625 9) (R10 2)   9  Presenile dementia, 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)   10  Well woman exam with routine gynecological exam (V72 31) (Z01 419)    Past Medical History    · Acute upper respiratory infection (465 9) (J06 9)   · History of Apnea (786 03) (R06 81)   · History of Arthralgia (719 40) (M25 50)   · History of Benign essential hypertension (401 1) (I10)   · History of Depression (311) (F32 9)   · History of Difficulty breathing (786 09) (R06 89)   · History of Dysmenorrhea (625 3) (N94 6)   · History of Fatigue (780 79) (R53 83)   · History of acute bronchitis (V12 69) (Z87 09)   · History of Insomnia (780 52) (G47 00)   · History of Muscle weakness (generalized) (728 87) (M62 81)   · History of Viral gastroenteritis (008 8) (A08 4)    Family History  Mother    · Family history of Fibromyalgia  Father    · Family history of Hypertension  Grandfather    · Family history of stroke (V17 1) (Z82 3)    Social History    · Denied: History of Alcohol use   · Denied: History of Drug use   · Former smoker (V15 82) (J71 691)    Current Meds   1  No Reported Medications Recorded    Allergies    1  No Known Drug Allergies    2  No Known Environmental Allergies   3   No Known Food Allergies    Vitals   Recorded: 89WZM7475 12:01PM   Temperature 97 2 F   Heart Rate 76   Respiration 18   Systolic 650   Diastolic 80   Height 5 ft 2 5 in   Weight 197 lb    BMI Calculated 35 46   BSA Calculated 1 91     Physical Exam    Constitutional   General appearance: No acute distress, well appearing and well nourished  Ears, Nose, Mouth, and Throat   External inspection of ears and nose: Normal     Otoscopic examination: Tympanic membranes translucent with normal light reflex  Canals patent without erythema  Oropharynx: Normal with no erythema, edema, exudate or lesions  Neck   Neck: Supple, symmetric, trachea midline, no masses  Pulmonary   Auscultation of lungs: Clear to auscultation  Cardiovascular   Auscultation of heart: Normal rate and rhythm, normal S1 and S2, no murmurs  Abdomen   Abdomen: Non-tender, no masses  Liver and spleen: No hepatomegaly or splenomegaly  Lymphatic   Palpation of lymph nodes in neck: No lymphadenopathy  Musculoskeletal   Gait and station: Normal     Neurologic   Reflexes: 2+ and symmetric  Psychiatric   Mood and affect: Normal        Procedure    Procedure: Visual Acuity Test    Indication: routine screening  Inforrmation supplied by a Snellen chart     Results: 20/20 in both eyes without corrective device, 20/20 in the right eye without corrective device, 20/25 in the left eye without corrective device      Signatures   Electronically signed by : Radha Cyr DO; Jun 1 2016 12:32PM EST                       (Author)

## 2018-01-22 VITALS
WEIGHT: 206 LBS | SYSTOLIC BLOOD PRESSURE: 134 MMHG | RESPIRATION RATE: 16 BRPM | TEMPERATURE: 97.9 F | HEIGHT: 62 IN | DIASTOLIC BLOOD PRESSURE: 80 MMHG | HEART RATE: 76 BPM | BODY MASS INDEX: 37.91 KG/M2

## 2018-01-22 VITALS
HEART RATE: 76 BPM | SYSTOLIC BLOOD PRESSURE: 122 MMHG | RESPIRATION RATE: 24 BRPM | DIASTOLIC BLOOD PRESSURE: 76 MMHG | TEMPERATURE: 96.6 F | WEIGHT: 208 LBS | HEIGHT: 63 IN | BODY MASS INDEX: 36.86 KG/M2

## 2018-01-22 VITALS
HEIGHT: 62 IN | WEIGHT: 208 LBS | RESPIRATION RATE: 20 BRPM | SYSTOLIC BLOOD PRESSURE: 144 MMHG | DIASTOLIC BLOOD PRESSURE: 92 MMHG | BODY MASS INDEX: 38.28 KG/M2 | HEART RATE: 76 BPM | TEMPERATURE: 98.5 F

## 2018-01-23 VITALS
BODY MASS INDEX: 37.91 KG/M2 | HEIGHT: 62 IN | DIASTOLIC BLOOD PRESSURE: 87 MMHG | WEIGHT: 206 LBS | RESPIRATION RATE: 17 BRPM | SYSTOLIC BLOOD PRESSURE: 134 MMHG

## 2018-01-24 VITALS
WEIGHT: 205 LBS | SYSTOLIC BLOOD PRESSURE: 140 MMHG | DIASTOLIC BLOOD PRESSURE: 86 MMHG | RESPIRATION RATE: 16 BRPM | HEIGHT: 62 IN | BODY MASS INDEX: 37.73 KG/M2 | HEART RATE: 82 BPM | TEMPERATURE: 97.6 F

## 2018-01-24 VITALS — SYSTOLIC BLOOD PRESSURE: 136 MMHG | DIASTOLIC BLOOD PRESSURE: 86 MMHG

## 2018-01-24 VITALS — HEIGHT: 62 IN | WEIGHT: 206 LBS | RESPIRATION RATE: 17 BRPM | BODY MASS INDEX: 37.91 KG/M2

## 2018-02-05 ENCOUNTER — TRANSCRIBE ORDERS (OUTPATIENT)
Dept: ADMINISTRATIVE | Facility: HOSPITAL | Age: 53
End: 2018-02-05

## 2018-02-05 DIAGNOSIS — M77.9 BONE SPUR: Primary | ICD-10-CM

## 2018-02-08 ENCOUNTER — OFFICE VISIT (OUTPATIENT)
Dept: PODIATRY | Facility: CLINIC | Age: 53
End: 2018-02-08
Payer: COMMERCIAL

## 2018-02-08 VITALS
BODY MASS INDEX: 34.96 KG/M2 | DIASTOLIC BLOOD PRESSURE: 94 MMHG | RESPIRATION RATE: 17 BRPM | SYSTOLIC BLOOD PRESSURE: 147 MMHG | HEIGHT: 62 IN | WEIGHT: 190 LBS | HEART RATE: 86 BPM

## 2018-02-08 DIAGNOSIS — M21.961 ACQUIRED DEFORMITY OF RIGHT FOOT: Primary | ICD-10-CM

## 2018-02-08 DIAGNOSIS — S92.014A CLOSED NONDISPLACED FRACTURE OF BODY OF RIGHT CALCANEUS, INITIAL ENCOUNTER: ICD-10-CM

## 2018-02-08 DIAGNOSIS — M72.2 PLANTAR FASCIITIS: ICD-10-CM

## 2018-02-08 DIAGNOSIS — M79.671 RIGHT FOOT PAIN: ICD-10-CM

## 2018-02-08 PROCEDURE — L4361 PNEUMA/VAC WALK BOOT PRE OTS: HCPCS | Performed by: PODIATRIST

## 2018-02-08 PROCEDURE — 99213 OFFICE O/P EST LOW 20 MIN: CPT | Performed by: PODIATRIST

## 2018-02-08 RX ORDER — SULINDAC 200 MG/1
1 TABLET ORAL 2 TIMES DAILY
COMMUNITY
Start: 2017-12-28 | End: 2018-03-05 | Stop reason: ALTCHOICE

## 2018-02-08 RX ORDER — PREGABALIN 75 MG/1
75 CAPSULE ORAL 2 TIMES DAILY
Qty: 60 CAPSULE | Refills: 0 | Status: SHIPPED | OUTPATIENT
Start: 2018-02-08 | End: 2018-03-05 | Stop reason: ALTCHOICE

## 2018-02-08 RX ORDER — METHYLPREDNISOLONE 4 MG/1
TABLET ORAL
Qty: 21 TABLET | Refills: 0 | Status: SHIPPED | OUTPATIENT
Start: 2018-02-08 | End: 2018-03-05 | Stop reason: ALTCHOICE

## 2018-02-08 NOTE — PROGRESS NOTES
Assessment/Plan:    Patient has recalcitrant pain  Patient has plantar fasciitis  We will rule out tarsal tunnel syndrome    Plan  We will add Medrol Dosepak  We will add Lyrica  Patient has been placed in a short-leg Aircast   MRI has been ordered  Patient may need surgery     There are no diagnoses linked to this encounter  Discussion/Summary   The patient was counseled regarding diagnostic results,-- instructions for management,-- prognosis,-- patient and family education,-- risks and benefits of treatment options,-- importance of compliance with treatment  Patient is able to Self-Care  Possible side effects of new medications were reviewed with the patient/guardian today  The treatment plan was reviewed with the patient/guardian  The patient/guardian understands and agrees with the treatment plan      Chief Complaint   Right foot pain      History of Present Illness   HPI: Patient has continued pain in her right heel only  Patient has post static dyskinesia  She now has pain in the back of the heel as well  No history of trauma      Review of Systems        Constitutional: No fever, no chills, feels well, no tiredness, no recent weight gain or weight loss  Eyes: No complaints of eye pain, no red eyes, no eyesight problems, no discharge, no dry eyes, no itching of eyes  ENT: no complaints of earache, no loss of hearing, no nose bleeds, no nasal discharge, no sore throat, no hoarseness  Cardiovascular: No complaints of slow heart rate, no fast heart rate, no chest pain, no palpitations, no leg claudication, no lower extremity edema  Respiratory: No complaints of shortness of breath, no wheezing, no cough, no SOB on exertion, no orthopnea, no PND  Gastrointestinal: No complaints of abdominal pain, no constipation, no nausea or vomiting, no diarrhea, no bloody stools        Genitourinary: No complaints of dysuria, no incontinence, no pelvic pain, no dysmenorrhea, no vaginal discharge or bleeding  Musculoskeletal: No complaints of arthralgias, no myalgias, no joint swelling or stiffness, no limb pain or swelling  Integumentary: No complaints of skin rash or lesions, no itching, no skin wounds, no breast pain or lump  Neurological: No complaints of headache, no confusion, no convulsions, no numbness, no dizziness or fainting, no tingling, no limb weakness, no difficulty walking  Psychiatric: Not suicidal, no sleep disturbance, no anxiety or depression, no change in personality, no emotional problems  Endocrine: No complaints of proptosis, no hot flashes, no muscle weakness, no deepening of the voice, no feelings of weakness  Hematologic/Lymphatic: No complaints of swollen glands, no swollen glands in the neck, does not bleed easily, does not bruise easily  Active Problems   1  Achilles tendinitis of right lower extremity (726 71) (M76 61)   2  Acquired ankle/foot deformity (736 70) (M21 969)   3  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 99) (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 9)   4  BMI 38 0-38 9,adult (V85 38) (Z68 38)   5  Colon cancer screening (V76 51) (Z12 11)   6  Decreased libido (799 81) (R68 82)   7  Difficulty walking (719 7) (R26 2)   8  Disorder of nail (703 9) (L60 9)   9  Encounter for colorectal cancer screening (V76 51) (Z12 11,Z12 12)   10  Encounter for screening mammogram for malignant neoplasm of breast (V76 12)      (Z12 31)   11  Fatigue (780 79) (R53 83)   12  Foot pain, bilateral (729 5) (M79 671,M79 672)   13  Hirsutism (704 1) (L68 0)   14  Hypercholesterolemia (272 0) (E78 00)   15  Impaired fasting glucose (790 21) (R73 01)   16  Nephrolithiasis (592 0) (N20 0)   17  Nicotine dependence (305 1) (F17 200)   18  Obesity, Class II, BMI 35-39 9 (278 00) (E66 9)   19  Pelvic pain in female (625 9) (R10 2)   20  Pes planus, congenital (754 61) (Q66 50)   21  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)   22  Screening mammogram, encounter for (V76 12) (Z12 31)   23   Well woman exam with routine gynecological exam (V72 31) (Z01 419)     Past Medical History    · Acute upper respiratory infection (465 9) (J06 9)   · History of Apnea (786 03) (R06 81)   · History of Arthralgia (719 40) (M25 50)   · History of Benign essential hypertension (401 1) (I10)   · History of Depression (311) (F32 9)   · History of Difficulty breathing (786 09) (R06 89)   · History of Dysmenorrhea (625 3) (N94 6)   · Exposure to potentially hazardous body fluids (V15 85) (Z77 21)   · History of Fatigue (780 79) (R53 83)   · History of acute bronchitis (V12 69) (Z87 09)   · History of Insomnia (780 52) (G47 00)   · History of Muscle weakness (generalized) (728 87) (M62 81)   · History of Screening for cardiovascular condition (V81 2) (Z13 6)   · History of Screening for diabetes mellitus (V77 1) (Z13 1)   · History of Screening for hyperlipidemia (V77 91) (Z13 220)   · History of Viral gastroenteritis (008 8) (A08 4)     Surgical History    · History of Appendectomy   · History of Tubal Ligation     Family History   Mother    · Family history of diabetes mellitus (V18 0) (Z83 3)   · Family history of Fibromyalgia  Father    · Family history of Hypertension  Grandfather    · Family history of stroke (V17 1) (Z82 3)     Social History    · Denied: History of Alcohol use   · Denied: History of Drug use   · Former smoker (V15 82) (N83 554)     Current Meds    1  MetFORMIN HCl - 500 MG Oral Tablet; Take 1 tablet daily; Therapy: 35YRS4353 to (Evaluate:17Mar2018)  Requested for: 00IXN4226; Last     Rx:[id number] Ordered   2  Sulindac 200 MG Oral Tablet; TAKE 1 TABLET 2 TIMES DAILY AFTER MEALS; Therapy: 22QND4592 to (Evaluate:27Jan2018)  Requested for: 58Ski0863; Last     Rx:27Nog1428 Ordered     Allergies   1  No Known Drug Allergies  2  No Known Environmental Allergies   3   No Known Food Allergies     Vitals     Recorded: 10MGH8350 02:40PM   Respiration 17   Height 5 ft 2 in   Weight 206 lb    BMI Calculated 37 68   BSA Calculated 1 94      Physical Exam   Left Foot: Appearance: Normal except as noted: excessive pronation-- and-- pes planus  Tenderness: medial calcaneous-- and-- insertion of the plantar fascia  Right Foot: Appearance: Normal except as noted: excessive pronation-- and-- pes planus  Tenderness: None except the medial calcaneous-- and-- insertion of the plantar fascia  Left Ankle: ROM: limited ROM in all planes    Right Ankle: Tenderness: None except the Achilles tendon insertion  ROM: limited ROM in all planes Motor: Normal     Neurological Exam: performed  Light touch was intact bilaterally  Vibratory sensation was intact bilaterally  Response to monofilament test was intact bilaterally  Deep tendon reflexes: patellar reflex present bilaterally-- and-- achilles reflex present bilaterally  Vascular Exam: performed Dorsalis pedis pulses were present bilaterally  Posterior tibial pulses were present bilaterally  Elevation Pallor: absent bilaterally  Dependence rubor was absent bilaterally  Edema: none  Toenails: All of the toenails were elongated,-- hypertrophied-- and-- discolored  Hyperkeratosis: present on both first toes  Shoe Gear Evaluation: performed ()  Recommendation(s): custom inlays-- and-- athletic shoes  Results/Data   I personally reviewed the films/images/results in the office today  My interpretation follows  X-ray Review Early calcaneal spurring noted  Both plantar and retrocalcaneal spurring noted of the right calcaneus  No evidence of fracture or bony mass  Subjective:     Patient ID: Ita Gee is a 46 y o  female  Patient has continued pain  Patient has been recalcitrant to treatment  She has failed injection therapy  She is taking medication as prescribed  Review of Systems      Objective:      Foot ExamPhysical Exam

## 2018-02-12 ENCOUNTER — HOSPITAL ENCOUNTER (OUTPATIENT)
Dept: RADIOLOGY | Facility: HOSPITAL | Age: 53
Discharge: HOME | End: 2018-02-12
Attending: PODIATRIST
Payer: COMMERCIAL

## 2018-02-12 DIAGNOSIS — M77.9 BONE SPUR: ICD-10-CM

## 2018-02-12 PROCEDURE — 73721 MRI JNT OF LWR EXTRE W/O DYE: CPT

## 2018-02-14 ENCOUNTER — TELEPHONE (OUTPATIENT)
Dept: PODIATRY | Facility: CLINIC | Age: 53
End: 2018-02-14

## 2018-02-15 ENCOUNTER — OFFICE VISIT (OUTPATIENT)
Dept: PODIATRY | Facility: CLINIC | Age: 53
End: 2018-02-15
Payer: COMMERCIAL

## 2018-02-15 VITALS
DIASTOLIC BLOOD PRESSURE: 88 MMHG | HEIGHT: 62 IN | BODY MASS INDEX: 34.96 KG/M2 | HEART RATE: 16 BPM | WEIGHT: 190 LBS | SYSTOLIC BLOOD PRESSURE: 147 MMHG | RESPIRATION RATE: 17 BRPM

## 2018-02-15 DIAGNOSIS — M72.2 PLANTAR FASCIITIS: Primary | ICD-10-CM

## 2018-02-15 DIAGNOSIS — M21.961 ACQUIRED DEFORMITY OF RIGHT FOOT: ICD-10-CM

## 2018-02-15 DIAGNOSIS — M79.671 RIGHT FOOT PAIN: ICD-10-CM

## 2018-02-15 PROCEDURE — 99213 OFFICE O/P EST LOW 20 MIN: CPT | Performed by: PODIATRIST

## 2018-02-15 RX ORDER — TRAMADOL HYDROCHLORIDE 50 MG/1
50 TABLET ORAL EVERY 8 HOURS PRN
Qty: 30 TABLET | Refills: 0 | Status: SHIPPED | OUTPATIENT
Start: 2018-02-15 | End: 2018-03-08

## 2018-02-15 RX ORDER — ETODOLAC 400 MG/1
400 TABLET, FILM COATED ORAL 2 TIMES DAILY
Qty: 60 TABLET | Refills: 0 | Status: SHIPPED | OUTPATIENT
Start: 2018-02-15 | End: 2018-03-08

## 2018-02-15 NOTE — PROGRESS NOTES
Assessment/Plan:  Patient has recalcitrant plantar fasciitis  This is of the right foot  Plan  We will control patient's pain  She is requesting surgical intervention  We will plan for plantar fasciectomy and heel spur removal   She is aware she will need to be nonweightbearing and out of work   There are no diagnoses linked to this encounter  Subjective:     Patient ID: Goyo Sam is a 46 y o  female  There are no diagnoses linked to this encounter  Discussion/Summary   The patient was counseled regarding diagnostic results,-- instructions for management,-- prognosis,-- patient and family education,-- risks and benefits of treatment options,-- importance of compliance with treatment     Patient is able to Self-Care     Possible side effects of new medications were reviewed with the patient/guardian today  The treatment plan was reviewed with the patient/guardian  The patient/guardian understands and agrees with the treatment plan      Chief Complaint   Right foot pain      History of Present Illness   HPI: Patient has continued pain in her right heel only  Patient has post static dyskinesia  She now has pain in the back of the heel as well  No history of trauma      Review of Systems        Constitutional: No fever, no chills, feels well, no tiredness, no recent weight gain or weight loss       Eyes: No complaints of eye pain, no red eyes, no eyesight problems, no discharge, no dry eyes, no itching of eyes       ENT: no complaints of earache, no loss of hearing, no nose bleeds, no nasal discharge, no sore throat, no hoarseness       Cardiovascular: No complaints of slow heart rate, no fast heart rate, no chest pain, no palpitations, no leg claudication, no lower extremity edema       Respiratory: No complaints of shortness of breath, no wheezing, no cough, no SOB on exertion, no orthopnea, no PND        Gastrointestinal: No complaints of abdominal pain, no constipation, no nausea or vomiting, no diarrhea, no bloody stools       Genitourinary: No complaints of dysuria, no incontinence, no pelvic pain, no dysmenorrhea, no vaginal discharge or bleeding       Musculoskeletal: No complaints of arthralgias, no myalgias, no joint swelling or stiffness, no limb pain or swelling       Integumentary: No complaints of skin rash or lesions, no itching, no skin wounds, no breast pain or lump       Neurological: No complaints of headache, no confusion, no convulsions, no numbness, no dizziness or fainting, no tingling, no limb weakness, no difficulty walking       Psychiatric: Not suicidal, no sleep disturbance, no anxiety or depression, no change in personality, no emotional problems       Endocrine: No complaints of proptosis, no hot flashes, no muscle weakness, no deepening of the voice, no feelings of weakness       Hematologic/Lymphatic: No complaints of swollen glands, no swollen glands in the neck, does not bleed easily, does not bruise easily       Active Problems   1  Achilles tendinitis of right lower extremity (726 71) (M76 61)   2  Acquired ankle/foot deformity (736 70) (M21 969)   3  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 99) (M12 9)   4  BMI 38 0-38 9,adult (V85 38) (Z68 38)   5  Colon cancer screening (V76 51) (Z12 11)   6  Decreased libido (799 81) (R68 82)   7  Difficulty walking (719 7) (R26 2)   8  Disorder of nail (703 9) (L60 9)   9  Encounter for colorectal cancer screening (V76 51) (Z12 11,Z12 12)   10  Encounter for screening mammogram for malignant neoplasm of breast (V76 12)      (Z12 31)   11  Fatigue (780 79) (R53 83)   12  Foot pain, bilateral (729 5) (M79 671,M79 672)   13  Hirsutism (704 1) (L68 0)   14  Hypercholesterolemia (272 0) (E78 00)   15  Impaired fasting glucose (790 21) (R73 01)   16  Nephrolithiasis (592 0) (N20 0)   17  Nicotine dependence (305 1) (F17 200)   18  Obesity, Class II, BMI 35-39 9 (278 00) (E66 9)   19  Pelvic pain in female (625 9) (R10 2)   20  Pes planus, congenital (754 61) (Q66 50)   21  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)   22  Screening mammogram, encounter for (V76 12) (Z12 31)   23  Well woman exam with routine gynecological exam (V72 31) (Z01 419)     Past Medical History    · Acute upper respiratory infection (465 9) (J06 9)   · History of Apnea (786 03) (R06 81)   · History of Arthralgia (719 40) (M25 50)   · History of Benign essential hypertension (401 1) (I10)   · History of Depression (311) (F32 9)   · History of Difficulty breathing (786 09) (R06 89)   · History of Dysmenorrhea (625 3) (N94 6)   · Exposure to potentially hazardous body fluids (V15 85) (Z77 21)   · History of Fatigue (780 79) (R53 83)   · History of acute bronchitis (V12 69) (Z87 09)   · History of Insomnia (780 52) (G47 00)   · History of Muscle weakness (generalized) (728 87) (M62 81)   · History of Screening for cardiovascular condition (V81 2) (Z13 6)   · History of Screening for diabetes mellitus (V77 1) (Z13 1)   · History of Screening for hyperlipidemia (V77 91) (Z13 220)   · History of Viral gastroenteritis (008 8) (A08 4)     Surgical History    · History of Appendectomy   · History of Tubal Ligation     Family History   Mother    · Family history of diabetes mellitus (V18 0) (Z83 3)   · Family history of Fibromyalgia  Father    · Family history of Hypertension  Grandfather    · Family history of stroke (V17 1) (Z82 3)     Social History    · Denied: History of Alcohol use   · Denied: History of Drug use   · Former smoker (V15 82) (Z87 891)     Current Meds    1  MetFORMIN HCl - 500 MG Oral Tablet;  Take 1 tablet daily;     Therapy: 89RLQ9091 to (Evaluate:17Mar2018)  Requested for: 28SEA4104; Last     Rx:[id number] Ordered   2  Sulindac 200 MG Oral Tablet; TAKE 1 TABLET 2 TIMES DAILY AFTER MEALS;     Therapy: 91OZG3566 to (Evaluate:27Jan2018)  Requested for: 14EAA8575; Last     Rx:[id number] Ordered     Allergies   1  No Known Drug Allergies  2  No Known Environmental Allergies   3  No Known Food Allergies     Vitals     Recorded: 96XGQ8556 02:40PM   Respiration 17   Height 5 ft 2 in   Weight 206 lb    BMI Calculated 37 68   BSA Calculated 1 94      Physical Exam   Left Foot: Appearance: Normal except as noted: excessive pronation-- and-- pes planus  Tenderness: medial calcaneous-- and-- insertion of the plantar fascia     Right Foot: Appearance: Normal except as noted: excessive pronation-- and-- pes planus  Tenderness: None except the medial calcaneous-- and-- insertion of the plantar fascia     Left Ankle: ROM: limited ROM in all planes    Right Ankle: Tenderness: None except the Achilles tendon insertion  ROM: limited ROM in all planes Motor: Normal     Neurological Exam: performed  Light touch was intact bilaterally  Vibratory sensation was intact bilaterally  Response to monofilament test was intact bilaterally  Deep tendon reflexes: patellar reflex present bilaterally-- and-- achilles reflex present bilaterally     Vascular Exam: performed Dorsalis pedis pulses were present bilaterally  Posterior tibial pulses were present bilaterally  Elevation Pallor: absent bilaterally  Dependence rubor was absent bilaterally  Edema: none     Toenails: All of the toenails were elongated,-- hypertrophied-- and-- discolored     Hyperkeratosis: present on both first toes     Shoe Gear Evaluation: performed ()  Recommendation(s): custom inlays-- and-- athletic shoes       Results/Data   I personally reviewed the films/images/results in the office today  My interpretation follows       X-ray Review Early calcaneal spurring noted  Both plantar and retrocalcaneal spurring noted of the right calcaneus  No evidence of fracture or bony mass    Patient has continued pain  Patient has had no relief of right heel pain  She is requesting surgery  She is aware of MRI result  Review of Systems      Objective:      Foot ExamPhysical Exam   Musculoskeletal:   Pain with palpation right plantar fascia insertion  Negative Tinel sign  No evidence of fracture  No evidence of mass      MRI demonstrates plantar fasciitis    No evidence of tarsal tunnel syndrome

## 2018-02-20 ENCOUNTER — OFFICE VISIT (OUTPATIENT)
Dept: PODIATRY | Facility: CLINIC | Age: 53
End: 2018-02-20
Payer: COMMERCIAL

## 2018-02-20 VITALS — HEIGHT: 62 IN | BODY MASS INDEX: 34.96 KG/M2 | WEIGHT: 190 LBS

## 2018-02-20 DIAGNOSIS — M72.2 PLANTAR FASCIITIS: Primary | ICD-10-CM

## 2018-02-20 DIAGNOSIS — M79.672 LEFT FOOT PAIN: ICD-10-CM

## 2018-02-20 PROCEDURE — 20550 NJX 1 TENDON SHEATH/LIGAMENT: CPT | Performed by: PODIATRIST

## 2018-02-20 NOTE — PROGRESS NOTES
Procedures   Foot Exam       Urgent visit  Patient presents as walking  Patient is planning right heel surgery  She has chronic pain in the right side  However, over the last several days patient has had increasing pain in her left heel  She has been diagnosed with bilateral plantar fasciitis  She is requesting injection therapy  Plan  Left heel injected with 1 25 cc of Kenalog 10  Patient will take medication as prescribed  She will stretch daily  She will return for preoperative evaluation

## 2018-03-01 ENCOUNTER — OFFICE VISIT (OUTPATIENT)
Dept: PODIATRY | Facility: CLINIC | Age: 53
End: 2018-03-01
Payer: COMMERCIAL

## 2018-03-01 VITALS
RESPIRATION RATE: 17 BRPM | BODY MASS INDEX: 34.96 KG/M2 | HEART RATE: 76 BPM | SYSTOLIC BLOOD PRESSURE: 145 MMHG | HEIGHT: 62 IN | DIASTOLIC BLOOD PRESSURE: 75 MMHG | WEIGHT: 190 LBS

## 2018-03-01 DIAGNOSIS — M72.2 PLANTAR FASCIITIS: Primary | ICD-10-CM

## 2018-03-01 DIAGNOSIS — M79.671 RIGHT FOOT PAIN: ICD-10-CM

## 2018-03-01 PROCEDURE — 99213 OFFICE O/P EST LOW 20 MIN: CPT | Performed by: PODIATRIST

## 2018-03-01 PROCEDURE — 73620 X-RAY EXAM OF FOOT: CPT | Performed by: PODIATRIST

## 2018-03-01 RX ORDER — HYDROXYZINE HYDROCHLORIDE 25 MG/1
25 TABLET, FILM COATED ORAL 3 TIMES DAILY
Qty: 9 TABLET | Refills: 0 | Status: SHIPPED | OUTPATIENT
Start: 2018-03-01 | End: 2018-03-05 | Stop reason: ALTCHOICE

## 2018-03-01 RX ORDER — LEVOFLOXACIN 500 MG/1
500 TABLET, FILM COATED ORAL EVERY 24 HOURS
Qty: 7 TABLET | Refills: 0 | Status: SHIPPED | OUTPATIENT
Start: 2018-03-01 | End: 2018-03-05 | Stop reason: ALTCHOICE

## 2018-03-01 NOTE — PATIENT INSTRUCTIONS
Patient advised on foot surgery  Patient understands need for nonweightbearing  She has consented to surgery

## 2018-03-05 ENCOUNTER — PREP FOR PROCEDURE (OUTPATIENT)
Dept: PODIATRY | Facility: CLINIC | Age: 53
End: 2018-03-05

## 2018-03-05 ENCOUNTER — OFFICE VISIT (OUTPATIENT)
Dept: FAMILY MEDICINE CLINIC | Facility: CLINIC | Age: 53
End: 2018-03-05
Payer: COMMERCIAL

## 2018-03-05 ENCOUNTER — HOSPITAL ENCOUNTER (OUTPATIENT)
Facility: HOSPITAL | Age: 53
Setting detail: OUTPATIENT SURGERY
End: 2018-03-05
Attending: PODIATRIST | Admitting: PODIATRIST
Payer: COMMERCIAL

## 2018-03-05 VITALS
HEART RATE: 102 BPM | HEIGHT: 62 IN | TEMPERATURE: 97.7 F | BODY MASS INDEX: 37.91 KG/M2 | RESPIRATION RATE: 16 BRPM | SYSTOLIC BLOOD PRESSURE: 130 MMHG | DIASTOLIC BLOOD PRESSURE: 80 MMHG | WEIGHT: 206 LBS

## 2018-03-05 DIAGNOSIS — M72.2 PLANTAR FASCIITIS OF RIGHT FOOT: ICD-10-CM

## 2018-03-05 DIAGNOSIS — M77.31 HEEL SPUR, RIGHT: Primary | ICD-10-CM

## 2018-03-05 DIAGNOSIS — R73.01 IMPAIRED FASTING GLUCOSE: ICD-10-CM

## 2018-03-05 DIAGNOSIS — Z01.818 PREOPERATIVE CLEARANCE: Primary | ICD-10-CM

## 2018-03-05 PROBLEM — L68.0 HIRSUTISM: Status: ACTIVE | Noted: 2017-11-17

## 2018-03-05 PROBLEM — M21.969 ACQUIRED ANKLE/FOOT DEFORMITY: Status: ACTIVE | Noted: 2017-11-15

## 2018-03-05 PROBLEM — R79.89 ELEVATED TESTOSTERONE LEVEL IN FEMALE: Status: ACTIVE | Noted: 2018-01-09

## 2018-03-05 PROBLEM — Q66.50 PES PLANUS, CONGENITAL: Status: ACTIVE | Noted: 2017-11-15

## 2018-03-05 PROCEDURE — 99242 OFF/OP CONSLTJ NEW/EST SF 20: CPT | Performed by: NURSE PRACTITIONER

## 2018-03-05 RX ORDER — OXYCODONE AND ACETAMINOPHEN 10; 325 MG/1; MG/1
TABLET ORAL
Refills: 0 | COMMUNITY
Start: 2018-03-04 | End: 2018-03-05 | Stop reason: ALTCHOICE

## 2018-03-05 NOTE — PATIENT INSTRUCTIONS
- Patient has been instructed to avoid herbs or non-directed vitamins the week prior to surgery to ensure no drug interactions with perioperative surgical and anesthetic medications  - Hold metformin the morning of surgery and do not resume until 48 hours AFTER surgery to avoid risk of lactic acidosis  Do not resume if eGFR is < 30  - Patient has been instructed to avoid aspirin containing medications or non-steroidal anti-inflammatory drugs for the week preceding surgery  Will call with blood work  Follow up for any new symptoms

## 2018-03-05 NOTE — PROGRESS NOTES
FAMILY PRACTICE PRE-OPERATIVE EVALUATION  [facility]    NAME: Kishan Amaya  AGE: 46 y o  SEX: female  : 1965     DATE: 3/5/2018    Family Practice Pre-Operative Evaluation      Chief Complaint: Pre-operative Evaluation     Surgery: Surgery for right plantar fascitis on 3/9/2018  Anticipated Date of Surgery: 2018  Referring Provider: Dr Mina Marti     History of Present Illness:     Kishan Amaya is a 46 y o  female who presents to the office today for a preoperative consultation at the request of surgeon, Dr Mina Marti, who plans on performing  on right plantar fascitis  Planned anesthesia is general  Patient has a bleeding risk of: no recent abnormal bleeding, no remote history of abnormal bleeding and no use of Ca-channel blockers  Patient does not have objections to receiving blood products if needed  Current anti-platelet/anti-coagulation medications that the patient is prescribed includes: None  Assessment of Chronic Conditions:   - Borderline high BP in past but never medically treated for it  - Was on metformin for impaired fasting glucose and stopped taking 3 weeks ago        Assessment of Cardiac Risk:  · Denies unstable or severe angina or MI in the last 6 weeks or history of stent placement in the last year   · Denies decompensated heart failure (e g  New onset heart failure, NYHA functional class IV heart failure, or worsening existing heart failure)  · Denies significant arrhythmias such as high grade AV block, symptomatic ventricular arrhythmia, newly recognized ventricular tachycardia, supraventricular tachycardia with resting heart rate >100, or symptomatic bradycardia  · Denies severe heart valve disease including aortic stenosis or symptomatic mitral stenosis     Exercise Capacity:  · Able to walk 4 blocks without symptoms?: Yes  · Able to walk 2 flights without symptoms?: Yes    Prior Anesthesia Reactions: No     Personal history of venous thromboembolic disease? No    History of steroid use for >2 weeks within last year? No         Review of Systems:     Review of Systems   Constitutional: Negative  HENT: Negative  Eyes: Negative  Respiratory: Negative  Cardiovascular: Negative  Gastrointestinal: Negative  Endocrine: Negative  Genitourinary: Negative  Musculoskeletal: Negative  Skin: Negative  Neurological: Negative  Hematological: Negative  Psychiatric/Behavioral: Negative          Current Problem List:     Patient Active Problem List   Diagnosis    Acquired deformity of right foot    Closed nondisplaced fracture of body of right calcaneus    Plantar fasciitis    Right foot pain    Left foot pain    Acquired ankle/foot deformity    Reduced libido    Elevated testosterone level in female    Hirsutism    Hypercholesterolemia    Impaired fasting glucose    Nicotine dependence    Pes planus, congenital    Plantar fibromatosis    Plantar fasciitis of right foot    Heel spur, right       Allergies:     No Known Allergies    Current Medications:       Current Outpatient Prescriptions:     etodolac (LODINE) 400 MG tablet, Take 1 tablet (400 mg total) by mouth 2 (two) times a day for 30 days, Disp: 60 tablet, Rfl: 0    traMADol (ULTRAM) 50 mg tablet, Take 1 tablet (50 mg total) by mouth every 8 (eight) hours as needed for moderate pain for up to 30 days, Disp: 30 tablet, Rfl: 0    Past Medical History:       Past Medical History:   Diagnosis Date    Apnea 01/22/2009    Arthralgia     last assessed 6/28/13    Benign essential hypertension 10/22/2010    Breathing difficulty 02/10/2009    Depression 10/09/2006    Dysmenorrhea 01/22/2009    Exposure to potentially hazardous body fluids     last assessed 6/1/16    Insomnia 10/09/2006    Kidney stone     20 years ago    Muscle weakness (generalized) 08/17/2006    Viral gastroenteritis     last assessed 6/13/13        Past Surgical History:   Procedure Laterality Date    APPENDECTOMY      last assessed 5/1/17    SALPINGOSTOMY      TUBAL LIGATION      last assessed 5/1/17        Family History   Problem Relation Age of Onset    Diabetes Mother     Fibromyalgia Mother     Hypertension Father     Stroke Maternal Grandfather         Social History     Social History    Marital status: /Civil Union     Spouse name: N/A    Number of children: N/A    Years of education: N/A     Occupational History    Not on file  Social History Main Topics    Smoking status: Former Smoker    Smokeless tobacco: Never Used    Alcohol use No    Drug use: No    Sexual activity: Not on file     Other Topics Concern    Not on file     Social History Narrative    No narrative on file        Physical Exam:     /80   Pulse 102   Temp 97 7 °F (36 5 °C)   Resp 16   Ht 5' 2" (1 575 m)   Wt 93 4 kg (206 lb)   BMI 37 68 kg/m²     Physical Exam   Constitutional: She is oriented to person, place, and time  She appears well-developed and well-nourished  HENT:   Head: Normocephalic  Right Ear: Tympanic membrane, external ear and ear canal normal    Left Ear: Tympanic membrane, external ear and ear canal normal    Nose: Nose normal    Mouth/Throat: Oropharynx is clear and moist and mucous membranes are normal    Eyes: Conjunctivae are normal  Pupils are equal, round, and reactive to light  Neck: Neck supple  No tracheal deviation present  Cardiovascular: Normal rate, regular rhythm and normal heart sounds  Pulmonary/Chest: Effort normal and breath sounds normal    Abdominal: Soft  Bowel sounds are normal  There is no tenderness  Musculoskeletal: Normal range of motion  Lymphadenopathy:        Right cervical: No superficial cervical and no posterior cervical adenopathy present  Left cervical: No superficial cervical and no posterior cervical adenopathy present  Right: No inguinal and no supraclavicular adenopathy present          Left: No inguinal and no supraclavicular adenopathy present  Neurological: She is alert and oriented to person, place, and time  She has normal reflexes  Skin: Skin is warm, dry and intact  Psychiatric: She has a normal mood and affect  Her speech is normal and behavior is normal  Judgment and thought content normal  Cognition and memory are normal    Vitals reviewed  Data:     Pre-operative work-up    Laboratory Results: BMP, CBC, HbA1C ordered      EKG: Ordered    Chest x-ray: Ordered      Previous cardiopulmonary studies within the past year:  · Echocardiogram: None  · Cardiac Catheterization: None  · Stress Test: None  · Pulmonary Function Testing: None      Assessment & Recommendations:     1  Preoperative clearance  Basic metabolic panel    CBC and differential    Protime-INR    ECG 12 lead    XR chest pa & lateral    HEMOGLOBIN A1C W/ EAG ESTIMATION    HEMOGLOBIN A1C W/ EAG ESTIMATION    Basic metabolic panel    CBC and differential    Protime-INR    HEMOGLOBIN A1C W/ EAG ESTIMATION    HEMOGLOBIN A1C W/ EAG ESTIMATION   2  Plantar fasciitis of right foot  Basic metabolic panel    CBC and differential    Protime-INR    HEMOGLOBIN A1C W/ EAG ESTIMATION    Basic metabolic panel    CBC and differential    Protime-INR    HEMOGLOBIN A1C W/ EAG ESTIMATION   3  Impaired fasting glucose  Basic metabolic panel    CBC and differential    Protime-INR    HEMOGLOBIN A1C W/ EAG ESTIMATION    HEMOGLOBIN A1C W/ EAG ESTIMATION    Basic metabolic panel    CBC and differential    Protime-INR    HEMOGLOBIN A1C W/ EAG ESTIMATION    HEMOGLOBIN A1C W/ EAG ESTIMATION   4  BMI 37 0-37 9, adult         Pre-Op Evaluation Assessment  46 y o  female with planned surgery: on 3/9 for right plantar fasicitis  Known risk factors for perioperative complications: None     Cardiac Risk Estimation: Pending as waiting for blood work, EKG and chest X-ray  Pre-Op Evaluation Plan  1   Further preoperative workup as follows:   - Chest x-ray  - ECG  - Complete blood count  - Basic metabolic profile  - Coagulation studies    2  Medication Management/Recommendations:   - Patient has been instructed to avoid herbs or non-directed vitamins the week prior to surgery to ensure no drug interactions with perioperative surgical and anesthetic medications  - Hold metformin the morning of surgery and do not resume until 48 hours AFTER surgery to avoid risk of lactic acidosis  Do not resume if eGFR is < 30  - Patient has been instructed to avoid aspirin containing medications or non-steroidal anti-inflammatory drugs for the week preceding surgery  3  Patient requires further consultation with: None    Clearance     Clearance for surgery is pending until blood work, ekg and x-ray chest until reports received and reviewed  BP at goal range and acceptable for surgery        Bren Astudillo, [address] [address]  Phone#  [phone]  Fax#  [phone]

## 2018-03-06 ENCOUNTER — OFFICE VISIT (OUTPATIENT)
Dept: LAB | Facility: HOSPITAL | Age: 53
End: 2018-03-06
Payer: COMMERCIAL

## 2018-03-06 ENCOUNTER — HOSPITAL ENCOUNTER (OUTPATIENT)
Dept: RADIOLOGY | Facility: HOSPITAL | Age: 53
Discharge: HOME | End: 2018-03-06
Payer: COMMERCIAL

## 2018-03-06 ENCOUNTER — TRANSCRIBE ORDERS (OUTPATIENT)
Dept: ADMINISTRATIVE | Facility: HOSPITAL | Age: 53
End: 2018-03-06

## 2018-03-06 DIAGNOSIS — Z01.818 PREOP EXAMINATION: ICD-10-CM

## 2018-03-06 DIAGNOSIS — Z01.818 PREOP EXAMINATION: Primary | ICD-10-CM

## 2018-03-06 DIAGNOSIS — Z01.818 PREOPERATIVE CLEARANCE: ICD-10-CM

## 2018-03-06 PROCEDURE — 93005 ELECTROCARDIOGRAM TRACING: CPT

## 2018-03-06 PROCEDURE — 71046 X-RAY EXAM CHEST 2 VIEWS: CPT

## 2018-03-07 LAB
ATRIAL RATE: 81 BPM
BASOPHILS # BLD AUTO: 0.1 X10E3/UL (ref 0–0.2)
BASOPHILS NFR BLD AUTO: 1 %
BUN SERPL-MCNC: 21 MG/DL (ref 6–24)
BUN/CREAT SERPL: 27 (ref 9–23)
CALCIUM SERPL-MCNC: 9.7 MG/DL (ref 8.7–10.2)
CHLORIDE SERPL-SCNC: 101 MMOL/L (ref 96–106)
CO2 SERPL-SCNC: 24 MMOL/L (ref 18–29)
CREAT SERPL-MCNC: 0.77 MG/DL (ref 0.57–1)
EOSINOPHIL # BLD AUTO: 0.2 X10E3/UL (ref 0–0.4)
EOSINOPHIL NFR BLD AUTO: 2 %
ERYTHROCYTE [DISTWIDTH] IN BLOOD BY AUTOMATED COUNT: 14.4 % (ref 12.3–15.4)
EST. AVERAGE GLUCOSE BLD GHB EST-MCNC: 120 MG/DL
GLUCOSE SERPL-MCNC: 112 MG/DL (ref 65–99)
HBA1C MFR BLD: 5.8 % (ref 4.8–5.6)
HCT VFR BLD AUTO: 44.8 % (ref 34–46.6)
HGB BLD-MCNC: 14.8 G/DL (ref 11.1–15.9)
IMM GRANULOCYTES # BLD: 0.1 X10E3/UL (ref 0–0.1)
IMM GRANULOCYTES NFR BLD: 1 %
INR PPP: 0.9 (ref 0.8–1.2)
LYMPHOCYTES # BLD AUTO: 2.3 X10E3/UL (ref 0.7–3.1)
LYMPHOCYTES NFR BLD AUTO: 26 %
MCH RBC QN AUTO: 30.1 PG (ref 26.6–33)
MCHC RBC AUTO-ENTMCNC: 33 G/DL (ref 31.5–35.7)
MCV RBC AUTO: 91 FL (ref 79–97)
MONOCYTES # BLD AUTO: 0.5 X10E3/UL (ref 0.1–0.9)
MONOCYTES NFR BLD AUTO: 6 %
NEUTROPHILS # BLD AUTO: 5.9 X10E3/UL (ref 1.4–7)
NEUTROPHILS NFR BLD AUTO: 64 %
P AXIS: 7 DEGREES
PLATELET # BLD AUTO: 366 X10E3/UL (ref 150–379)
POTASSIUM SERPL-SCNC: 4.9 MMOL/L (ref 3.5–5.2)
PR INTERVAL: 134 MS
PROTHROMBIN TIME: 9.8 SEC (ref 9.1–12)
QRS AXIS: -17 DEGREES
QRSD INTERVAL: 142 MS
QT INTERVAL: 414 MS
QTC INTERVAL: 480 MS
RBC # BLD AUTO: 4.91 X10E6/UL (ref 3.77–5.28)
SL AMB EGFR AFRICAN AMERICAN: 103 ML/MIN/1.73
SL AMB EGFR NON AFRICAN AMERICAN: 89 ML/MIN/1.73
SODIUM SERPL-SCNC: 141 MMOL/L (ref 134–144)
T WAVE AXIS: 131 DEGREES
VENTRICULAR RATE: 81 BPM
WBC # BLD AUTO: 8.9 X10E3/UL (ref 3.4–10.8)

## 2018-03-07 PROCEDURE — 93010 ELECTROCARDIOGRAM REPORT: CPT | Performed by: INTERNAL MEDICINE

## 2018-03-08 ENCOUNTER — OFFICE VISIT (OUTPATIENT)
Dept: CARDIOLOGY CLINIC | Facility: CLINIC | Age: 53
End: 2018-03-08
Payer: COMMERCIAL

## 2018-03-08 ENCOUNTER — TELEPHONE (OUTPATIENT)
Dept: FAMILY MEDICINE CLINIC | Facility: CLINIC | Age: 53
End: 2018-03-08

## 2018-03-08 ENCOUNTER — ANESTHESIA EVENT (OUTPATIENT)
Dept: PERIOP | Facility: HOSPITAL | Age: 53
End: 2018-03-08

## 2018-03-08 VITALS
BODY MASS INDEX: 38.83 KG/M2 | WEIGHT: 211 LBS | DIASTOLIC BLOOD PRESSURE: 96 MMHG | SYSTOLIC BLOOD PRESSURE: 162 MMHG | HEART RATE: 98 BPM | OXYGEN SATURATION: 95 % | HEIGHT: 62 IN

## 2018-03-08 DIAGNOSIS — M72.2 PLANTAR FASCIITIS: ICD-10-CM

## 2018-03-08 DIAGNOSIS — Z01.818 PREOPERATIVE CLEARANCE: ICD-10-CM

## 2018-03-08 DIAGNOSIS — L68.0 HIRSUTISM: ICD-10-CM

## 2018-03-08 DIAGNOSIS — R73.01 IMPAIRED FASTING GLUCOSE: ICD-10-CM

## 2018-03-08 DIAGNOSIS — I44.7 LBBB (LEFT BUNDLE BRANCH BLOCK): ICD-10-CM

## 2018-03-08 DIAGNOSIS — E78.5 DYSLIPIDEMIA: ICD-10-CM

## 2018-03-08 DIAGNOSIS — R06.02 SHORTNESS OF BREATH: ICD-10-CM

## 2018-03-08 DIAGNOSIS — E66.9 OBESITY (BMI 35.0-39.9 WITHOUT COMORBIDITY): ICD-10-CM

## 2018-03-08 DIAGNOSIS — I10 ESSENTIAL HYPERTENSION: ICD-10-CM

## 2018-03-08 PROCEDURE — 99244 OFF/OP CNSLTJ NEW/EST MOD 40: CPT | Performed by: INTERNAL MEDICINE

## 2018-03-08 RX ORDER — AMLODIPINE BESYLATE 5 MG/1
5 TABLET ORAL DAILY
Qty: 30 TABLET | Refills: 1 | Status: SHIPPED | OUTPATIENT
Start: 2018-03-08 | End: 2018-04-09 | Stop reason: SDUPTHER

## 2018-03-08 NOTE — TELEPHONE ENCOUNTER
I personally called Dr Shagufta Calero office and patient needs to be evaluated by cardiologist for left bundle branch block on ECG and have no previous EKG to compare and patient had other risk factors of impaired fasting glucose and previously documented high BP readings but not on any treatment this time  Discussed EKG changes with Dr Denita Bello and will not clear her for surgery at this time and will refer to cardiology

## 2018-03-08 NOTE — PROGRESS NOTES
Blood work reviewed, chest x-ray and EKG reviewed  HbA1c at acceptable level for surgical intervention  But EKG showed left bundle branch block and have no ekg to compare and needs to be evaluated by cardiologist and discussed with [place] and agrees with the plan  Patient and Dr Berna Gale office called and informed

## 2018-03-08 NOTE — TELEPHONE ENCOUNTER
I also called patient and informed that she is not medically clear and needs evaluation by cardiologist due to EKG changes

## 2018-03-08 NOTE — PRE-PROCEDURE INSTRUCTIONS
My Surgical Experience    The following information was developed to assist you to prepare for your operation  What do I need to do before coming to the hospital?   Arrange for a responsible person to drive you to and from the hospital    Arrange care for your children at home  Children are not allowed in the recovery areas of the hospital   Plan to wear clothing that is easy to put on and take off  If you are having shoulder surgery, wear a shirt that buttons or zippers in the front  Bathing  o Shower the evening before and the morning of your surgery with an antibacterial soap  Please refer to the Pre Op Showering Instructions for Surgery Patients Sheet   o Remove nail polish and all body piercing jewelry  o Do not shave any body part for at least 24 hours before surgery-this includes face, arms, legs and upper body  Food  o Nothing to eat or drink after midnight the night before your surgery  This includes candy and chewing gum  o Exception: If your surgery is after 12:00pm (noon), you may have clear liquids such as 7-Up®, ginger ale, apple or cranberry juice, Jell-O®, water, or clear broth until 8:00 am  o Do not drink milk or juice with pulp on the morning before surgery  o Do not drink alcohol 24 hours before surgery  Medicine  o Follow instructions you received from your surgeon about which medicines you may take on the day of surgery  o If instructed to take medicine on the morning of surgery, take pills with just a small sip of water  Call your prescribing doctor for specific infroamtion on what to do if you take insulin    What should I bring to the hospital?    Bring:  Janna Potters or a walker, if you have them, for foot or knee surgery   A list of the daily medicines, vitamins, minerals, herbals and nutritional supplements you take   Include the dosages of medicines and the time you take them each day   Glasses, dentures or hearing aids   Minimal clothing; you will be wearing hospital sleepwear   Photo ID; required to verify your identity   If you have a Living Will or Power of , bring a copy of the documents   If you have an ostomy, bring an extra pouch and any supplies you use    Do not bring   Medicines or inhalers   Money, valuables or jewelry    What other information should I know about the day of surgery?  Notify your surgeons if you develop a cold, sore throat, cough, fever, rash or any other illness   Report to the Ambulatory Surgical/Same Day Surgery Unit   You will be instructed to stop at Registration only if you have not been pre-registered   Inform your  fi they do not stay that they will be asked by the staff to leave a phone number where they can be reached   Be available to be reached before surgery  In the event the operating room schedule changes, you may be asked to come in earlier or later than expected    *It is important to tell your doctor and others involved in your health care if you are taking or have been taking any non-prescription drugs, vitamins, minerals, herbals or other nutritional supplements  Any of these may interact with some food or medicines and cause a reaction      Pre-Surgery Instructions:   Medication Instructions    etodolac (LODINE) 400 MG tablet Instructed patient per Anesthesia Guidelines   traMADol (ULTRAM) 50 mg tablet Instructed patient per Anesthesia Guidelines

## 2018-03-08 NOTE — LETTER
March 8, 2018     Babatunde Calderon, DO  [address] [facility] [address]    Patient: Walter Berry   YOB: 1965   Date of Visit: 3/8/2018       Dear Dr Tino Valentine: Thank you for referring Walter Berry to me for evaluation  Below are my notes for this consultation  If you have questions, please do not hesitate to call me  I look forward to following your patient along with you  Sincerely,        Radha Mendez MD, [facility] [place]        CC: DONTAE Ramos  1  Shortness of breath worse with mild-to-moderate exertion  Etiology may be multifactorial   Patient has been a heavy smoker quit 3 years ago and also had weight gain  However she is also diagnosed LBBB, there is a possible given like cardiomyopathy  And she has multiple cardiac risk factors for coronary artery disease  Will check echo Doppler and Lexiscan stress test   2   LBBB  There is no old EKG to compare  Need to rule out cardiomyopathy echo already ordered  3  Hypertension which is uncontrolled  Patient not compliant with her medication  This was discussed with patient and   Will start her on amlodipine 5 mg daily at this time  If needed will add losartan  4   History of high testosterone level and hirsutism  Patient is scheduled to see endocrinologist   She has a unexpected weight gain also  5   Preop clearance for for surgery under general anesthesia  At this time patient has multiple cardiac and noncardiac issues  We need to control her blood pressure better  We need echo for LV function and find etiology of her shortness of breath  It is a elective surgery and should be postponed  This was discussed with patient and  and they agree with the plan  6   Moderate obesity with BMI 39  Advised to lose weight  7  Impaired glucose metabolism with dyslipidemia and high triglyceride level  HbA1c 5 8  Patient may need statin therapy, if diet does not control triglyceride levels    This was discussed with patient and   Amber Eckert

## 2018-03-08 NOTE — PROGRESS NOTES
Consultation - Cardiology  Office  Paige Montanez 46 y o  female MRN: [id number]  : 1965   Encounter: [id number]    Assessment:  1  Shortness of breath    2  LBBB (left bundle branch block)    3  Essential hypertension    4  Impaired fasting glucose    5  Plantar fasciitis    6  Hirsutism    7  Dyslipidemia    8  Obesity (BMI 35 0-39 9 without comorbidity)    9  Preoperative clearance        Discussion Summary & Plan:   1  Shortness of breath worse with mild-to-moderate exertion  Etiology may be multifactorial   Patient has been a heavy smoker quit 3 years ago and also had weight gain  However she is also diagnosed LBBB, there is a possible given like cardiomyopathy  And she has multiple cardiac risk factors for coronary artery disease  Will check echo Doppler and Lexiscan stress test   2   LBBB  There is no old EKG to compare  Need to rule out cardiomyopathy echo already ordered  3  Hypertension which is uncontrolled  Patient not compliant with her medication  This was discussed with patient and   Will start her on amlodipine 5 mg daily at this time  If needed will add losartan  4   History of high testosterone level and hirsutism  Patient is scheduled to see endocrinologist   She has a unexpected weight gain also  5   Preop clearance for for surgery under general anesthesia  At this time patient has multiple cardiac and noncardiac issues  We need to control her blood pressure better  We need echo for LV function and find etiology of her shortness of breath  It is a elective surgery and should be postponed  This was discussed with patient and  and they agree with the plan  6   Moderate obesity with BMI 39  Advised to lose weight  7  Impaired glucose metabolism with dyslipidemia and high triglyceride level  HbA1c 5 8  Patient may need statin therapy, if diet does not control triglyceride levels  This was discussed with patient and        Summary of Recommendations: Echo Doppler  Lexiscan stress test  Start amlodipine 5 mg daily  Follow-up after above workup  Counseling :  A description of the counseling:   Goals and Barriers:  Patient's ability to self care: Yes  Medication side effect reviewed with patient in detail and all their questions answered to their satisfaction  Physician Requesting Consult: Babatunde Calderon DO    Reason for Consult:  Shortness of breath, abnormal EKG, preop clearance      HPI:     Walter Berry is a 46y o  year old female who presents with complaints about abnormal EKG and preop clearance  Patient has past medical history significant for chronic shortness of breath, recent weight gain of 70 lb as per , previous history of heavy tobacco abuse quit smoking few years ago, possible COPD, severe plantar fasciitis, needs surgery under general anesthesia, hypertension and dyslipidemia but not on any medications, impaired glucose metabolism versus diabetes mellitus but not on any Rx who came for preop clearance and above-mentioned complaint  Patient is scheduled to have surgery under general anesthesia and was found to have abnormal EKG  She has no recent EKG done before  She denies any chest pain but as per  she can hardly climb 1-2 flights of stair and gets short of breath  She has been a heavy smoker since she is a teenager but quit smoking 3 years ago  Lately she is found to have a hears on her body and high testosterone level and is supposed to see an endocrinologist   She has not seen it yet  Her blood pressure was found to be high when she came to our office and she was also stressed out as she could not get her surgery  She denies any leg swelling  No PND no orthopnea  Denies any chest pain but she gets easily short of breath  No nausea no vomiting no other significant complaint  Review of Systems   Constitutional: Positive for unexpected weight change     Respiratory: Positive for shortness of breath and wheezing  Endocrine:        Elevated testosterone level and body hairs   Musculoskeletal: Positive for gait problem  History of plantar fasciitis   Psychiatric/Behavioral: The patient is nervous/anxious  All other systems reviewed and are negative  Historical Information   Past Medical History:   Diagnosis Date    Apnea 01/22/2009    Arthralgia     last assessed 6/28/13    Depression 10/09/2006    Dysmenorrhea 01/22/2009    Essential hypertension 3/8/2018    Exposure to potentially hazardous body fluids     last assessed 6/1/16    Insomnia 10/09/2006    Kidney stone     20 years ago    Muscle weakness (generalized) 08/17/2006    Viral gastroenteritis     last assessed 6/13/13     Past Surgical History:   Procedure Laterality Date    APPENDECTOMY      last assessed 5/1/17    SALPINGECTOMY      had a tubal pregnancy    SALPINGOSTOMY      TONSILLECTOMY         Social History:  History   Alcohol Use No     History   Drug Use No     History   Smoking Status    Former Smoker    Years: 30 00    Quit date: 3/8/2014   Smokeless Tobacco    Never Used          Family History   Problem Relation Age of Onset    Diabetes Mother     Fibromyalgia Mother     Hypertension Father     Stroke Maternal Grandfather        Meds/Allergies     No Known Allergies    Current medications:    Current Outpatient Prescriptions:     amLODIPine (NORVASC) 5 mg tablet, Take 1 tablet (5 mg total) by mouth daily, Disp: 30 tablet, Rfl: 1      Objective:     Vitals: Blood pressure 162/96, pulse 98, height 5' 2" (1 575 m), weight 95 7 kg (211 lb), SpO2 95 %  Body mass index is 38 59 kg/m²    Vitals:    03/08/18 1411   Weight: 95 7 kg (211 lb)     BP Readings from Last 3 Encounters:   03/08/18 162/96   03/05/18 130/80   03/01/18 145/75         Physical Exam:   Physical Exam    Neurologic:  Alert & oriented x 3, no new focal deficits, Not in any acute distress,  Constitutional:  Well developed, well nourished, non-toxic appearance   Eyes:  Pupil equal and reacting to light, conjunctiva normal, No JVP  HENT:  Atraumatic, oropharynx moist, Neck- normal range of motion, no tenderness, neck supple  Respiratory:  Bilateral air entry, mostly clear to auscultation  Cardiovascular: S1-S2 regular with a 2/6 ejection systolic murmur and S4 is present  GI:  Soft, nondistended, normal bowel sounds, nontender, no hepatosplenomegaly appreciated  Musculoskeletal: No tenderness, no deformities  Skin:  Well hydrated, no rash   Lymphatic:  No lymphadenopathy noted   Extremities:  No edema and distal pulses are present    Labs:     Lab Results   Component Value Date    WBC 10 2 11/16/2017    HGB 14 8 03/06/2018    HCT 44 8 03/06/2018    MCV 91 03/06/2018    RDW 14 4 03/06/2018     03/06/2018     BMP:  Lab Results   Component Value Date     11/16/2017    K 4 6 11/16/2017    CL 98 11/16/2017    CO2 24 11/16/2017    BUN 21 03/06/2018    CREATININE 0 77 03/06/2018    GLUCOSE 123 (H) 11/16/2017    CALCIUM 9 8 11/16/2017     LFT:  Lab Results   Component Value Date    AST 23 11/16/2017    ALT 33 (H) 11/16/2017    ALKPHOS 85 11/16/2017    PROT 7 4 11/16/2017    BILITOT 0 4 11/16/2017       Lab Results   Component Value Date    HGBA1C 5 8 (H) 03/06/2018     Lipid Profile:   Lab Results   Component Value Date    CHOL 200 (H) 11/16/2017    HDL 59 11/16/2017    LDLCALC 94 11/16/2017    TRIG 237 (H) 11/16/2017     Lab Results   Component Value Date    CHOL 200 (H) 11/16/2017    HDL 59 11/16/2017    LDLCALC 94 11/16/2017    TRIG 237 (H) 11/16/2017     Imaging: I have personally reviewed pertinent reports  Chest X-Ray:   Xr Chest Pa & Lateral    Result Date: 3/6/2018  Impression No acute cardiopulmonary disease  Workstation performed: [id number]     Diagnostic Studies Review Cardio:  No recent cardiac testing    EKG:  Twelve lead EKG shows normal sinus rhythm LBBB  There is no old EKG to compare    This EKG done at [facility] on 03/07/2018    Dr Domingo Leroy MD [facility] [place]      "This note has been constructed using a voice recognition system  Therefore there may be syntax, spelling, and/or grammatical errors  Please call if you have any questions

## 2018-03-09 ENCOUNTER — ANESTHESIA (OUTPATIENT)
Dept: PERIOP | Facility: HOSPITAL | Age: 53
End: 2018-03-09

## 2018-03-14 ENCOUNTER — OFFICE VISIT (OUTPATIENT)
Dept: ENDOCRINOLOGY | Facility: CLINIC | Age: 53
End: 2018-03-14
Payer: COMMERCIAL

## 2018-03-14 VITALS
HEIGHT: 62 IN | BODY MASS INDEX: 38 KG/M2 | SYSTOLIC BLOOD PRESSURE: 150 MMHG | DIASTOLIC BLOOD PRESSURE: 98 MMHG | WEIGHT: 206.5 LBS | HEART RATE: 94 BPM

## 2018-03-14 DIAGNOSIS — R63.5 ABNORMAL WEIGHT GAIN: Primary | ICD-10-CM

## 2018-03-14 DIAGNOSIS — L68.0 HIRSUTISM: ICD-10-CM

## 2018-03-14 DIAGNOSIS — R73.01 IMPAIRED FASTING GLUCOSE: ICD-10-CM

## 2018-03-14 DIAGNOSIS — G47.9 SLEEP DISTURBANCE: ICD-10-CM

## 2018-03-14 DIAGNOSIS — R79.89 ELEVATED TESTOSTERONE LEVEL IN FEMALE: ICD-10-CM

## 2018-03-14 DIAGNOSIS — E66.9 OBESITY (BMI 35.0-39.9 WITHOUT COMORBIDITY): ICD-10-CM

## 2018-03-14 PROCEDURE — 99244 OFF/OP CNSLTJ NEW/EST MOD 40: CPT | Performed by: INTERNAL MEDICINE

## 2018-03-14 RX ORDER — DEXAMETHASONE 1 MG
TABLET ORAL
Qty: 1 TABLET | Refills: 0 | Status: SHIPPED | OUTPATIENT
Start: 2018-03-14 | End: 2018-04-25 | Stop reason: ALTCHOICE

## 2018-03-14 RX ORDER — OXYCODONE HCL 10 MG/1
10 TABLET, FILM COATED, EXTENDED RELEASE ORAL EVERY 12 HOURS SCHEDULED
COMMUNITY
End: 2018-04-09 | Stop reason: ALTCHOICE

## 2018-03-14 RX ORDER — OXYCODONE AND ACETAMINOPHEN 10; 325 MG/1; MG/1
1 TABLET ORAL EVERY 4 HOURS PRN
COMMUNITY
End: 2018-04-09 | Stop reason: ALTCHOICE

## 2018-03-14 NOTE — ASSESSMENT & PLAN NOTE
Counseled on increased risk of developing type 2 diabetes, needs to focus on dietary and lifestyle modifications and weight loss    Referred to see the nutritionist

## 2018-03-14 NOTE — PROGRESS NOTES
Kim Mejisa 46 y o  female MRN: [id number]    Encounter: [id number]      Assessment/Plan     Problem List Items Addressed This Visit     Elevated testosterone level in female    Hirsutism      Hirsutism plus elevated testosterone level-consider starting her on spironolactone         Impaired fasting glucose      Counseled on increased risk of developing type 2 diabetes, needs to focus on dietary and lifestyle modifications and weight loss  Referred to see the nutritionist         Relevant Orders    Ambulatory referral to medical nutrition therapy for diabetes    Obesity (BMI 35 0-39 9 without comorbidity)    Abnormal weight gain - Primary      Patient and family very concerned about weight gain over the past few years  And want to start weight loss medications either saxenda or Qsymia  for now will workup for any causes of abnormal weight, gain and do a dexamethasone suppression test    discussed with the patient and family member that with weight management the 1st step would be to see a nutritionist and after that consider medications  Would suggest starting saxenda after workup is completed as well as she has seen a nutritionist-  She denies any family history of medullary thyroid cancer or MEN  Syndromes  No personal history of pancreatitis  Father does have some thyroid problems she is going to confirm with him and let us know next visit         Relevant Medications    dexamethasone (DECADRON) 1 mg tablet    Other Relevant Orders    Cortisol Level, AM Specimen Lab Collect    Dexamethasone    Ambulatory referral to medical nutrition therapy for diabetes    Sleep disturbance      States that she was tested for sleep apnea 5 years back which was negative    However given weight gain of 70 lb in the past few years she should be reassessed              CC:  Weight gain    History of Present Illness     HPI:  49-year-old woman with history of pre diabetes, hypertension referred here for evaluation of weight gain   Weight gain -70 lbs in the past 3 years after quitting smoking 3 years back  Never seen a nutritionist   Diagnosed with prediabetes ,HTN for the past few years   No proximal muscle weakness  Sleep study 4 -5 years back - no sleep apnea   does Snores   C/o fatigue   No mood issues   C/o acne , c/o hirsutism facial -   Postmenopausal for the past 5 years          Review of Systems   Constitutional: Positive for fatigue and unexpected weight change  Negative for fever  Respiratory: Positive for cough and shortness of breath  Cardiovascular: Negative for chest pain, palpitations and leg swelling  Gastrointestinal: Negative for constipation, diarrhea, nausea and vomiting  Endocrine: Negative for polydipsia and polyuria  Skin: Negative for color change, pallor, rash and wound  Neurological: Negative for weakness  Psychiatric/Behavioral: Positive for sleep disturbance  All other systems reviewed and are negative        Historical Information   Past Medical History:   Diagnosis Date    Apnea 01/22/2009    Arthralgia     last assessed 6/28/13    Depression 10/09/2006    Dysmenorrhea 01/22/2009    Essential hypertension 3/8/2018    Exposure to potentially hazardous body fluids     last assessed 6/1/16    Insomnia 10/09/2006    Kidney stone     20 years ago    Muscle weakness (generalized) 08/17/2006    Viral gastroenteritis     last assessed 6/13/13     Past Surgical History:   Procedure Laterality Date    APPENDECTOMY      last assessed 5/1/17    SALPINGECTOMY      had a tubal pregnancy    SALPINGOSTOMY      TONSILLECTOMY       Social History   History   Alcohol Use No     History   Drug Use No     History   Smoking Status    Former Smoker    Years: 30 00    Quit date: 3/8/2014   Smokeless Tobacco    Never Used     Family History:   Family History   Problem Relation Age of Onset    Diabetes Mother     Fibromyalgia Mother     Hypertension Father     Hyperthyroidism Father     Stroke Maternal Grandfather        Meds/Allergies   Current Outpatient Prescriptions   Medication Sig Dispense Refill    amLODIPine (NORVASC) 5 mg tablet Take 1 tablet (5 mg total) by mouth daily 30 tablet 1    oxyCODONE-acetaminophen (PERCOCET)  mg per tablet Take 1 tablet by mouth every 4 (four) hours as needed for moderate pain      dexamethasone (DECADRON) 1 mg tablet 1 tab at 11 pm the night before labs 1 tablet 0    oxyCODONE (OXYCONTIN) 10 mg 12 hr tablet Take 10 mg by mouth every 12 (twelve) hours       No current facility-administered medications for this visit  No Known Allergies    Objective   Vitals: Blood pressure 150/98, pulse 94, height 5' 2" (1 575 m), weight 93 7 kg (206 lb 8 oz)  Physical Exam   Constitutional: She is oriented to person, place, and time  She appears well-developed and well-nourished  No distress  HENT:   Head: Normocephalic and atraumatic  Mouth/Throat: No oropharyngeal exudate  Eyes: Conjunctivae and EOM are normal  No scleral icterus  Neck: Normal range of motion  Neck supple  No thyromegaly present  Cardiovascular: Normal rate, regular rhythm and normal heart sounds  No murmur heard  Pulmonary/Chest: Effort normal and breath sounds normal  No respiratory distress  She has no wheezes  Abdominal: Soft  Bowel sounds are normal  She exhibits no distension  There is no tenderness  There is no rebound and no guarding  Musculoskeletal: Normal range of motion  She exhibits no edema or deformity  Lymphadenopathy:     She has no cervical adenopathy  Neurological: She is alert and oriented to person, place, and time  Skin: Skin is warm and dry  No rash noted  Facial hirsutism   Psychiatric: She has a normal mood and affect  Her behavior is normal  Judgment and thought content normal        The history was obtained from the review of the chart, patient and family      Lab Results:   Lab Results   Component Value Date/Time    Hemoglobin A1C 5 8 (H) 03/06/2018 08:09 AM    WBC 10 2 11/16/2017 07:08 AM    Hemoglobin 14 8 03/06/2018 08:09 AM    Hemoglobin 14 7 11/16/2017 07:08 AM    Hematocrit 44 8 03/06/2018 08:09 AM    Hematocrit 43 4 11/16/2017 07:08 AM    MCV 91 03/06/2018 08:09 AM    MCV 91 11/16/2017 07:08 AM    Platelets 640 50/52/9984 07:08 AM    Platelet Count 297 72/95/2693 08:09 AM    BUN 21 03/06/2018 08:09 AM    BUN 15 11/16/2017 07:08 AM    Sodium 137 11/16/2017 07:08 AM    Potassium 4 6 11/16/2017 07:08 AM    Chloride 98 11/16/2017 07:08 AM    CO2 24 11/16/2017 07:08 AM    Creatinine 0 69 11/16/2017 07:08 AM    Creatinine, Serum 0 77 03/06/2018 08:09 AM    AST 23 11/16/2017 07:08 AM    ALT 33 (H) 11/16/2017 07:08 AM    Albumin 4 5 11/16/2017 07:08 AM    Cholesterol 200 (H) 11/16/2017 07:08 AM    HDL 59 11/16/2017 07:08 AM    Triglycerides 237 (H) 11/16/2017 07:08 AM        November 2017-total testosterone 54    Imaging Studies: I have personally reviewed pertinent reports  CT abdomen  February 2016-mild left hydronephrosis and perinephric infiltration 12 due to a 4 mm proximal left ureteral calculus, nonobstructing left renal calculus, enlarged fatty liver  Portions of the record may have been created with voice recognition software  Occasional wrong word or "sound a like" substitutions may have occurred due to the inherent limitations of voice recognition software  Read the chart carefully and recognize, using context, where substitutions have occurred

## 2018-03-14 NOTE — ASSESSMENT & PLAN NOTE
Patient and family very concerned about weight gain over the past few years  And want to start weight loss medications either saxenda or Qsymia  for now will workup for any causes of abnormal weight, gain and do a dexamethasone suppression test    discussed with the patient and family member that with weight management the 1st step would be to see a nutritionist and after that consider medications  Would suggest starting saxenda after workup is completed as well as she has seen a nutritionist-  She denies any family history of medullary thyroid cancer or MEN  Syndromes  No personal history of pancreatitis    Father does have some thyroid problems she is going to confirm with him and let us know next visit

## 2018-03-14 NOTE — LETTER
March 14, 2018     Brenda Becerra DO  [address] [phone]    Patient: Linnette Puckett   YOB: 1965   Date of Visit: 3/14/2018       Dear Dr Peggy Fleischer: Thank you for referring Linnette Puckett to me for evaluation  Below are my notes for this consultation  If you have questions, please do not hesitate to call me  I look forward to following your patient along with you  Sincerely,        Lorenza Harrison MD        CC: No Recipients  Lorenza Harrison MD  3/14/2018 12:39 PM  Sign at close encounter   Linnette Puckett 46 y o  female MRN: [id number]    Encounter: [id number]      Assessment/Plan     Problem List Items Addressed This Visit     Elevated testosterone level in female    Hirsutism      Hirsutism plus elevated testosterone level-consider starting her on spironolactone         Impaired fasting glucose      Counseled on increased risk of developing type 2 diabetes, needs to focus on dietary and lifestyle modifications and weight loss  Referred to see the nutritionist         Relevant Orders    Ambulatory referral to medical nutrition therapy for diabetes    Obesity (BMI 35 0-39 9 without comorbidity)    Abnormal weight gain - Primary      Patient and family very concerned about weight gain over the past few years  And want to start weight loss medications either saxenda or Qsymia  for now will workup for any causes of abnormal weight, gain and do a dexamethasone suppression test    discussed with the patient and family member that with weight management the 1st step would be to see a nutritionist and after that consider medications  Would suggest starting saxenda after workup is completed as well as she has seen a nutritionist-  She denies any family history of medullary thyroid cancer or MEN  Syndromes  No personal history of pancreatitis    Father does have some thyroid problems she is going to confirm with him and let us know next visit         Relevant Medications dexamethasone (DECADRON) 1 mg tablet    Other Relevant Orders    Cortisol Level, AM Specimen Lab Collect    Dexamethasone    Ambulatory referral to medical nutrition therapy for diabetes    Sleep disturbance      States that she was tested for sleep apnea 5 years back which was negative  However given weight gain of 70 lb in the past few years she should be reassessed              CC:  Weight gain    History of Present Illness     HPI:  63-year-old woman with history of pre diabetes, hypertension referred here for evaluation of weight gain  Weight gain -70 lbs in the past 3 years after quitting smoking 3 years back  Never seen a nutritionist   Diagnosed with prediabetes ,HTN for the past few years   No proximal muscle weakness  Sleep study 4 -5 years back - no sleep apnea   does Snores   C/o fatigue   No mood issues   C/o acne , c/o hirsutism facial -   Postmenopausal for the past 5 years          Review of Systems   Constitutional: Positive for fatigue and unexpected weight change  Negative for fever  Respiratory: Positive for cough and shortness of breath  Cardiovascular: Negative for chest pain, palpitations and leg swelling  Gastrointestinal: Negative for constipation, diarrhea, nausea and vomiting  Endocrine: Negative for polydipsia and polyuria  Skin: Negative for color change, pallor, rash and wound  Neurological: Negative for weakness  Psychiatric/Behavioral: Positive for sleep disturbance  All other systems reviewed and are negative        Historical Information   Past Medical History:   Diagnosis Date    Apnea 01/22/2009    Arthralgia     last assessed 6/28/13    Depression 10/09/2006    Dysmenorrhea 01/22/2009    Essential hypertension 3/8/2018    Exposure to potentially hazardous body fluids     last assessed 6/1/16    Insomnia 10/09/2006    Kidney stone     20 years ago    Muscle weakness (generalized) 08/17/2006    Viral gastroenteritis     last assessed 6/13/13 Past Surgical History:   Procedure Laterality Date    APPENDECTOMY      last assessed 5/1/17    SALPINGECTOMY      had a tubal pregnancy    SALPINGOSTOMY      TONSILLECTOMY       Social History   History   Alcohol Use No     History   Drug Use No     History   Smoking Status    Former Smoker    Years: 30 00    Quit date: 3/8/2014   Smokeless Tobacco    Never Used     Family History:   Family History   Problem Relation Age of Onset    Diabetes Mother     Fibromyalgia Mother     Hypertension Father     Hyperthyroidism Father     Stroke Maternal Grandfather        Meds/Allergies   Current Outpatient Prescriptions   Medication Sig Dispense Refill    amLODIPine (NORVASC) 5 mg tablet Take 1 tablet (5 mg total) by mouth daily 30 tablet 1    oxyCODONE-acetaminophen (PERCOCET)  mg per tablet Take 1 tablet by mouth every 4 (four) hours as needed for moderate pain      dexamethasone (DECADRON) 1 mg tablet 1 tab at 11 pm the night before labs 1 tablet 0    oxyCODONE (OXYCONTIN) 10 mg 12 hr tablet Take 10 mg by mouth every 12 (twelve) hours       No current facility-administered medications for this visit  No Known Allergies    Objective   Vitals: Blood pressure 150/98, pulse 94, height 5' 2" (1 575 m), weight 93 7 kg (206 lb 8 oz)  Physical Exam   Constitutional: She is oriented to person, place, and time  She appears well-developed and well-nourished  No distress  HENT:   Head: Normocephalic and atraumatic  Mouth/Throat: No oropharyngeal exudate  Eyes: Conjunctivae and EOM are normal  No scleral icterus  Neck: Normal range of motion  Neck supple  No thyromegaly present  Cardiovascular: Normal rate, regular rhythm and normal heart sounds  No murmur heard  Pulmonary/Chest: Effort normal and breath sounds normal  No respiratory distress  She has no wheezes  Abdominal: Soft  Bowel sounds are normal  She exhibits no distension  There is no tenderness   There is no rebound and no guarding  Musculoskeletal: Normal range of motion  She exhibits no edema or deformity  Lymphadenopathy:     She has no cervical adenopathy  Neurological: She is alert and oriented to person, place, and time  Skin: Skin is warm and dry  No rash noted  Facial hirsutism   Psychiatric: She has a normal mood and affect  Her behavior is normal  Judgment and thought content normal        The history was obtained from the review of the chart, patient and family  Lab Results:   Lab Results   Component Value Date/Time    Hemoglobin A1C 5 8 (H) 03/06/2018 08:09 AM    WBC 10 2 11/16/2017 07:08 AM    Hemoglobin 14 8 03/06/2018 08:09 AM    Hemoglobin 14 7 11/16/2017 07:08 AM    Hematocrit 44 8 03/06/2018 08:09 AM    Hematocrit 43 4 11/16/2017 07:08 AM    MCV 91 03/06/2018 08:09 AM    MCV 91 11/16/2017 07:08 AM    Platelets 948 11/46/2099 07:08 AM    Platelet Count 448 83/87/0783 08:09 AM    BUN 21 03/06/2018 08:09 AM    BUN 15 11/16/2017 07:08 AM    Sodium 137 11/16/2017 07:08 AM    Potassium 4 6 11/16/2017 07:08 AM    Chloride 98 11/16/2017 07:08 AM    CO2 24 11/16/2017 07:08 AM    Creatinine 0 69 11/16/2017 07:08 AM    Creatinine, Serum 0 77 03/06/2018 08:09 AM    AST 23 11/16/2017 07:08 AM    ALT 33 (H) 11/16/2017 07:08 AM    Albumin 4 5 11/16/2017 07:08 AM    Cholesterol 200 (H) 11/16/2017 07:08 AM    HDL 59 11/16/2017 07:08 AM    Triglycerides 237 (H) 11/16/2017 07:08 AM        November 2017-total testosterone 54    Imaging Studies: I have personally reviewed pertinent reports  CT abdomen  February 2016-mild left hydronephrosis and perinephric infiltration 12 due to a 4 mm proximal left ureteral calculus, nonobstructing left renal calculus, enlarged fatty liver  Portions of the record may have been created with voice recognition software  Occasional wrong word or "sound a like" substitutions may have occurred due to the inherent limitations of voice recognition software   Read the chart carefully and recognize, using context, where substitutions have occurred

## 2018-03-14 NOTE — ASSESSMENT & PLAN NOTE
States that she was tested for sleep apnea 5 years back which was negative    However given weight gain of 70 lb in the past few years she should be reassessed

## 2018-03-15 ENCOUNTER — OFFICE VISIT (OUTPATIENT)
Dept: PODIATRY | Facility: CLINIC | Age: 53
End: 2018-03-15
Payer: COMMERCIAL

## 2018-03-15 VITALS
RESPIRATION RATE: 17 BRPM | HEIGHT: 62 IN | BODY MASS INDEX: 38 KG/M2 | SYSTOLIC BLOOD PRESSURE: 142 MMHG | DIASTOLIC BLOOD PRESSURE: 92 MMHG | HEART RATE: 98 BPM | WEIGHT: 206.5 LBS

## 2018-03-15 DIAGNOSIS — M72.2 PLANTAR FIBROMATOSIS: Primary | ICD-10-CM

## 2018-03-15 DIAGNOSIS — M79.671 RIGHT FOOT PAIN: ICD-10-CM

## 2018-03-15 DIAGNOSIS — M79.672 LEFT FOOT PAIN: ICD-10-CM

## 2018-03-15 DIAGNOSIS — R26.2 DIFFICULTY WALKING: ICD-10-CM

## 2018-03-15 DIAGNOSIS — Q66.50 CONGENITAL PES PLANUS, UNSPECIFIED LATERALITY: ICD-10-CM

## 2018-03-15 PROCEDURE — 99212 OFFICE O/P EST SF 10 MIN: CPT | Performed by: PODIATRIST

## 2018-03-15 PROCEDURE — 20550 NJX 1 TENDON SHEATH/LIGAMENT: CPT | Performed by: PODIATRIST

## 2018-03-15 RX ORDER — DULOXETIN HYDROCHLORIDE 30 MG/1
30 CAPSULE, DELAYED RELEASE ORAL DAILY
Qty: 30 CAPSULE | Refills: 0 | Status: SHIPPED | OUTPATIENT
Start: 2018-03-15 | End: 2018-04-09 | Stop reason: SDUPTHER

## 2018-03-15 NOTE — PROGRESS NOTES
Assessment/Plan:  Bilateral plantar fasciitis  We will rule out fibromyalgia  Bilateral heel injection done  1 25 cc of Kenalog 10 was injected each heel without pain or complication  We will add Cymbalta  Patient has been referred for cardiac workup  Patient will need right heel surgery  No problem-specific Assessment & Plan notes found for this encounter  There are no diagnoses linked to this encounter  Subjective:      Patient ID: Ron Toribio is a 46 y o  female  Patient has continued pain in both feet  Patient suffers from plantar fasciitis  Nothing is afforded her relief  She is awaiting surgery  This is pending cardiac workup  She is requesting bilateral injection therapy  The following portions of the patient's history were reviewed and updated as appropriate: allergies, current medications, past family history, past medical history, past social history, past surgical history and problem list     Review of Systems      Objective:  Objective:      Foot Exam     General  General Appearance: appears stated age and healthy   Orientation: alert and oriented to person, place, and time   Affect: appropriate   Gait: antalgic         Right Foot/Ankle      Inspection and Palpation  Tenderness: plantar fascia      Neurovascular  Dorsalis pedis: 3+  Posterior tibial: 3+     Comments  Pain with palpation right heel  No evidence of mass  No evidence of infection      X-ray  X-ray demonstrates plantar calcaneal spurring      Left Foot/Ankle       Neurovascular  Dorsalis pedis: 3+  Posterior tibial: 3+        Physical Exam   Cardiovascular:   Pulses:       Dorsalis pedis pulses are 3+ on the right side, and 3+ on the left side  Posterior tibial pulses are 3+ on the right side, and 3+ on the left side         Review of Systems        Constitutional: No fever, no chills, feels well, no tiredness, no recent weight gain or weight loss       Eyes: No complaints of eye pain, no red eyes, no eyesight problems, no discharge, no dry eyes, no itching of eyes       ENT: no complaints of earache, no loss of hearing, no nose bleeds, no nasal discharge, no sore throat, no hoarseness       Cardiovascular: No complaints of slow heart rate, no fast heart rate, no chest pain, no palpitations, no leg claudication, no lower extremity edema       Respiratory: No complaints of shortness of breath, no wheezing, no cough, no SOB on exertion, no orthopnea, no PND        Gastrointestinal: No complaints of abdominal pain, no constipation, no nausea or vomiting, no diarrhea, no bloody stools       Genitourinary: No complaints of dysuria, no incontinence, no pelvic pain, no dysmenorrhea, no vaginal discharge or bleeding       Musculoskeletal: No complaints of arthralgias, no myalgias, no joint swelling or stiffness, no limb pain or swelling       Integumentary: No complaints of skin rash or lesions, no itching, no skin wounds, no breast pain or lump       Neurological: No complaints of headache, no confusion, no convulsions, no numbness, no dizziness or fainting, no tingling, no limb weakness, no difficulty walking       Psychiatric: Not suicidal, no sleep disturbance, no anxiety or depression, no change in personality, no emotional problems       Endocrine: No complaints of proptosis, no hot flashes, no muscle weakness, no deepening of the voice, no feelings of weakness       Hematologic/Lymphatic: No complaints of swollen glands, no swollen glands in the neck, does not bleed easily, does not bruise easily       Active Problems   1  Acquired ankle/foot deformity (736 70) (M21 969)   2  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 99) (M12 9)   3  BMI 38 0-38 9,adult (V85 38) (Z68 38)   4  Colon cancer screening (V76 51) (Z12 11)   5  Decreased libido (799 81) (R68 82)   6  Difficulty walking (719 7) (R26 2)   7  Disorder of nail (703 9) (L60 9)   8  Encounter for colorectal cancer screening (V76 51) (I54 29,X14 27)   9  Encounter for screening mammogram for malignant neoplasm of breast (V76 12)     (Z12 31)   10  Fatigue (780 79) (R53 83)   11  Foot pain, bilateral (729 5) (M79 671,M79 672)   12  Hirsutism (704 1) (L68 0)   13  Hypercholesterolemia (272 0) (E78 00)   14  Impaired fasting glucose (790 21) (R73 01)   15  Nephrolithiasis (592 0) (N20 0)   16  Nicotine dependence (305 1) (F17 200)   17  Obesity, Class II, BMI 35-39 9 (278 00) (E66 9)   18  Pelvic pain in female (625 9) (R10 2)   19  Pes planus, congenital (754 61) (Q66 50)   20  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)   21  Screening mammogram, encounter for (V76 12) (Z12 31)   22  Well woman exam with routine gynecological exam (V72 31) (Z01 419)     Past Medical History    · Acute upper respiratory infection (465 9) (J06 9)   · History of Apnea (786 03) (R06 81)   · History of Arthralgia (719 40) (M25 50)   · History of Benign essential hypertension (401 1) (I10)   · History of Depression (311) (F32 9)   · History of Difficulty breathing (786 09) (R06 89)   · History of Dysmenorrhea (625 3) (N94 6)   · Exposure to potentially hazardous body fluids (V15 85) (Z77 21)   · History of Fatigue (780 79) (R53 83)   · History of acute bronchitis (V12 69) (Z87 09)   · History of Insomnia (780 52) (G47 00)   · History of Muscle weakness (generalized) (728 87) (M62 81)   · History of Screening for cardiovascular condition (V81 2) (Z13 6)   · History of Screening for diabetes mellitus (V77 1) (Z13 1)   · History of Screening for hyperlipidemia (V77 91) (Z13 220)   · History of Viral gastroenteritis (008 8) (A08 4)     Surgical History    · History of Appendectomy   · History of Tubal Ligation     Family History   Mother    · Family history of diabetes mellitus (V18 0) (Z83 3)   · Family history of Fibromyalgia  Father    · Family history of Hypertension  Grandfather    · Family history of stroke (V17 1) (Z82 3)     Social History    · Denied: History of Alcohol use   · Denied: History of Drug use   · Former smoker (V15 82) (R91 307)     Current Meds    1  MetFORMIN HCl - 500 MG Oral Tablet; Take 1 tablet daily;     Therapy: [id number] to (Evaluate:17Mar2018)  Requested for: [id number]; Last     Rx:17Nov2017 Ordered     Allergies   1  No Known Drug Allergies  2  No Known Environmental Allergies   3  No Known Food Allergies     Vitals     Recorded: [id number] 03:50PM   Respiration 17   Systolic 089   Diastolic 87   Height 5 ft 2 in   Weight 206 lb    BMI Calculated 37 68   BSA Calculated 1 94      Physical Exam   Left Foot: Appearance: Normal except as noted: excessive pronation-- and-- pes planus  Tenderness: medial calcaneous-- and-- insertion of the plantar fascia     Right Foot: Appearance: Normal except as noted: excessive pronation-- and-- pes planus  Tenderness: None except the medial calcaneous-- and-- insertion of the plantar fascia     Left Ankle: ROM: limited ROM in all planes    Right Ankle: Tenderness: None except the Achilles tendon insertion  ROM: limited ROM in all planes Motor: Normal     Neurological Exam: performed  Light touch was intact bilaterally  Vibratory sensation was intact bilaterally  Response to monofilament test was intact bilaterally  Deep tendon reflexes: patellar reflex present bilaterally-- and-- achilles reflex present bilaterally     Vascular Exam: performed Dorsalis pedis pulses were present bilaterally  Posterior tibial pulses were present bilaterally  Elevation Pallor: absent bilaterally  Dependence rubor was absent bilaterally  Edema: none     Toenails: All of the toenails were elongated,-- hypertrophied-- and-- discolored     Hyperkeratosis: present on both first toes     Shoe Gear Evaluation: performed ()   Recommendation(s): custom inlays-- and-- athletic shoes     Foot ExamPhysical Exam

## 2018-03-16 ENCOUNTER — HOSPITAL ENCOUNTER (OUTPATIENT)
Dept: NON INVASIVE DIAGNOSTICS | Facility: HOSPITAL | Age: 53
Discharge: HOME | End: 2018-03-16
Attending: INTERNAL MEDICINE
Payer: COMMERCIAL

## 2018-03-16 ENCOUNTER — HOSPITAL ENCOUNTER (OUTPATIENT)
Dept: NON INVASIVE DIAGNOSTICS | Facility: HOSPITAL | Age: 53
Discharge: HOME | End: 2018-03-16
Payer: COMMERCIAL

## 2018-03-16 ENCOUNTER — HOSPITAL ENCOUNTER (OUTPATIENT)
Dept: RADIOLOGY | Facility: HOSPITAL | Age: 53
Discharge: HOME | End: 2018-03-16
Payer: COMMERCIAL

## 2018-03-16 DIAGNOSIS — I44.7 LBBB (LEFT BUNDLE BRANCH BLOCK): ICD-10-CM

## 2018-03-16 DIAGNOSIS — Z01.818 PREOPERATIVE CLEARANCE: ICD-10-CM

## 2018-03-16 DIAGNOSIS — R06.02 SHORTNESS OF BREATH: ICD-10-CM

## 2018-03-16 LAB
CHEST PAIN STATEMENT: NORMAL
MAX DIASTOLIC BP: 100 MMHG
MAX HEART RATE: 96 BPM
MAX PREDICTED HEART RATE: 168 BPM
MAX. SYSTOLIC BP: 179 MMHG
PROTOCOL NAME: NORMAL
REASON FOR TERMINATION: NORMAL
TARGET HR FORMULA: NORMAL
TIME IN EXERCISE PHASE: NORMAL

## 2018-03-16 PROCEDURE — 93017 CV STRESS TEST TRACING ONLY: CPT

## 2018-03-16 PROCEDURE — 78452 HT MUSCLE IMAGE SPECT MULT: CPT

## 2018-03-16 PROCEDURE — A9502 TC99M TETROFOSMIN: HCPCS

## 2018-03-16 PROCEDURE — 93306 TTE W/DOPPLER COMPLETE: CPT

## 2018-03-16 RX ADMIN — REGADENOSON 0.4 MG: 0.08 INJECTION, SOLUTION INTRAVENOUS at 09:19

## 2018-03-17 LAB — CORTIS AM PEAK SERPL-MCNC: 0.9 UG/DL (ref 6.2–19.4)

## 2018-03-18 PROCEDURE — 93306 TTE W/DOPPLER COMPLETE: CPT | Performed by: INTERNAL MEDICINE

## 2018-03-18 PROCEDURE — 93018 CV STRESS TEST I&R ONLY: CPT | Performed by: INTERNAL MEDICINE

## 2018-03-18 PROCEDURE — 78452 HT MUSCLE IMAGE SPECT MULT: CPT | Performed by: INTERNAL MEDICINE

## 2018-03-18 PROCEDURE — 93016 CV STRESS TEST SUPVJ ONLY: CPT | Performed by: INTERNAL MEDICINE

## 2018-03-18 NOTE — PROGRESS NOTES
Please call the patient regarding her abnormal result   Appropriate response to dexamethasone suppression test

## 2018-03-19 ENCOUNTER — TELEPHONE (OUTPATIENT)
Dept: ENDOCRINOLOGY | Facility: CLINIC | Age: 53
End: 2018-03-19

## 2018-03-19 ENCOUNTER — OFFICE VISIT (OUTPATIENT)
Dept: DIABETES SERVICES | Facility: CLINIC | Age: 53
End: 2018-03-19
Payer: COMMERCIAL

## 2018-03-19 ENCOUNTER — TELEPHONE (OUTPATIENT)
Dept: CARDIOLOGY CLINIC | Facility: CLINIC | Age: 53
End: 2018-03-19

## 2018-03-19 VITALS — WEIGHT: 205.4 LBS | BODY MASS INDEX: 37.57 KG/M2

## 2018-03-19 DIAGNOSIS — R63.5 ABNORMAL WEIGHT GAIN: ICD-10-CM

## 2018-03-19 DIAGNOSIS — R73.01 IMPAIRED FASTING GLUCOSE: Primary | ICD-10-CM

## 2018-03-19 PROCEDURE — 97802 MEDICAL NUTRITION INDIV IN: CPT | Performed by: DIETITIAN, REGISTERED

## 2018-03-19 RX ORDER — PREGABALIN 75 MG/1
50 CAPSULE ORAL 3 TIMES DAILY
COMMUNITY
End: 2018-10-22 | Stop reason: ALTCHOICE

## 2018-03-19 NOTE — PROGRESS NOTES
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  She admits to having poor eating habits and attributes some of it to her work schedule (i e  Patient does get a lunch break, but rarely takes it as she is on the road)  Problems identified in food recall include meal skipping, large portions, dinner meal that lacks balance, low intake of plant based foods, whole grains, and low fat dairy and general lack of balance  Provided patient with a 1300 calorie meal plan to assist with consistency, balance and portion control  Encouraged the consumption of regular meals at regular times (no meal skipping)  Advised patient to keep carbohydrate intake to 45 grams per meal to assist with glycemic control and calorie control  Suggested keeping protein intake to 6 ounces a day and fat to 3servings daily to assist with lipid management and calorie control  Darya does not exercise secondary to heel spur  She might benefit from non-weight bearing exercise (i e  Water exercises or a bike)  She was enocuraged to speak to her cardiologist prior to initiating aerobic exercise  Patient agreed to keep daily food logs  She will check with her insurance about benefits for follow-up visits  RD will remain available for further dietary questions/concerns  Ht Readings from Last 1 Encounters:   03/15/18 5' 2" (1 575 m)     Wt Readings from Last 3 Encounters:   03/19/18 93 2 kg (205 lb 6 4 oz)   03/15/18 93 7 kg (206 lb 8 oz)   03/14/18 93 7 kg (206 lb 8 oz)     Weight Change: Yes Weight gain of 70 pounds in the past 3 years since smoking cessation      Barriers to Learning: no barriers    Do you follow any special diet presently?: No  Who shops: patient  Who cooks: patient    Food Log: Completed via the method of food recall    Dinor[address],; 1-2 cups coffee with sweet-n-low and a little bit of whole milk; rarely will have 1 packet of flavored oatmeal  Morning Snack:9:30-10:00 3 times a month 1 medium banana or 20 small grapes; water  Lunch:SKIPS DAILY; may have something 5 days out of a month; 1/2 (6") [place] hoagie or 2 glazed donuts or grapes or popcorn; water  Afternoon Snack: none  Dinner:6:00PM either  salad with 1 cup (6+ oz) pork chops, steak or chicken and greens and carrots with 1 5 tsp Ranch dressing OR 6 ounces protein and veggie noodles or cauliflower rice or 2 cups regular rice or noodle (3-4 times a week); [place] food (3x a month) or pizza (1x a week); water  Evening Snack:11:00PM (once a week) 1 tbsp PB  Beverages: water and coffee  Eating out/Take out:Pizza and [place] food  Exercise Does not exercise secondary to heel spurs  Calorie needs 1300 kcals/day Carbs: 45 g/meal     Fat: 3 servings/day    Protein:6 ouncess/day    Nutrition Diagnosis:  Food and nutrition related knowledge deficit  related to Lack of prior exposure to accurate nutrition related information as evidenced by No prior knowledge of need for food and nutrition related recommendations    Intervention: plate method, label reading, behavior modification strategies, carbohydrate counting, increased plant based foods, meal timing, individualized meal plan, monitoring portion control, exercise guidelines and food diary     Treatment Goals: Patient will consume 3 meals a day, Patient will count carbohydrates and Speak to cardiologist about initiating aerobic exercise  Monitoring and evaluation:    Term code indicator  FH 1 3 2 Food Intake Criteria: Consume 3 meals a day (no meal skipping)    Term code indicator  FH 1 6 3 Carbohydrate Intake Criteria: 45 grams carbohdyrate per meal   Term code indicator  CH 2 2 Treatments/Therapy/Alternative Medicine Criteria: Speak to cardiologist about safe level of activity  Materials Provided: Portions Booklet, individualized meal plan    Patients Response to Instruction:  Comprehensiongood  Motivationgood  Expected Compliancegood    Start- Stop: 3:35-4:32  Total Minutes: 62 Minutes  Group or Individual Instruction: MNT-I     Thank you for coming to the [facility] for education today  Please feel free to call with any questions or concerns      Rhiannon Tellez  [address] [facility],  [address] [facility] [address]

## 2018-03-19 NOTE — TELEPHONE ENCOUNTER
----- Message from Shara Tijerina MD sent at 3/17/2018 11:22 PM EDT -----  Please call the patient regarding her abnormal result   Appropriate response to dexamethasone suppression test

## 2018-03-19 NOTE — PATIENT INSTRUCTIONS
1  Consume 3 meals a day (no skipping)  2   45 grams of carbohydrate per meal   3  Speak to cardiologist about regular aerobic exercise

## 2018-03-20 ENCOUNTER — TELEPHONE (OUTPATIENT)
Dept: CARDIOLOGY CLINIC | Facility: CLINIC | Age: 53
End: 2018-03-20

## 2018-03-20 NOTE — TELEPHONE ENCOUNTER
Pre  Op  Clearance note- Cardiology    Whit Boards   46 y o   female  1965      Radha Men, DO    Whit Boards presents for preop evaluation:     Patient was seen in our office on 03/08/2018  Patient has past medical history significant for shortness of breath, LBBB, hypertension, history distant, dyslipidemia, obesity  Patient is now scheduled for surgery under general anesthesia  Patient has no clinical evidence of heart failure or ischemia or active arrhythmia  Patient's last cardiac workup including echo and nuclear stress test reports were reviewed and it shows, there was a small partially reversible perfusion defect in apical wall due to low count and small to moderate partially reversible defect inferior septal likely due to construction artifact  Overall ejection fraction was 56 percent  Echo Doppler done recently shows normal LV systolic function EF 37-90 percent  Paradoxical septal motion consistent with LBBB  No other significant valvular disease seen  These studies were done March 16, 2018     In my opinion patient is in optimum condition for the procedure as planned  Patient is in optimal condition for the surgery as planned  Continue current cardiac medications  Patient can hold  Aspirin for  5-7 days as required for surgery  Patient can hold Eliquis/Xarelto/Pradaxa for 3 days before the procedure  Please restart after the procedure when okay from surgical point of view and advise patient to contact our office  If you have any question please do not hesitate to call us at our office of [facility]  Phone # [phone]        Lab Results   Component Value Date    WBC 10 2 11/16/2017    HGB 14 8 03/06/2018    HCT 44 8 03/06/2018    MCV 91 03/06/2018     03/06/2018     Lab Results   Component Value Date    CREATININE 0 77 03/06/2018     No results found for: YASMANY Chang   3/20/2018  5:11 PM

## 2018-03-21 ENCOUNTER — TELEPHONE (OUTPATIENT)
Dept: CARDIOLOGY CLINIC | Facility: CLINIC | Age: 53
End: 2018-03-21

## 2018-03-21 NOTE — TELEPHONE ENCOUNTER
PLEASE CALL DANIA PROBLEMS WITH BP HAD APPOITMENT TODAY BUT BECAUSE LEAVING WANTS TO TALK TO YOU SHE IS AWARE THAT YOU WILL CLEAR HER BUT WANTS TO DISCUSS BP

## 2018-03-21 NOTE — TELEPHONE ENCOUNTER
Spoke to patient  Blood pressure has been running high  Increase amlodipine to 5 mg twice a day  She is in optimal condition for the procedure as planned  I already signed the clearance yesterday

## 2018-03-28 PROBLEM — M72.2 PLANTAR FASCIAL FIBROMATOSIS: Status: ACTIVE | Noted: 2018-03-28

## 2018-03-30 ENCOUNTER — HOSPITAL ENCOUNTER (OUTPATIENT)
Facility: HOSPITAL | Age: 53
Setting detail: OUTPATIENT SURGERY
Discharge: HOME | End: 2018-03-30
Attending: PODIATRIST | Admitting: PODIATRIST
Payer: COMMERCIAL

## 2018-03-30 ENCOUNTER — ANESTHESIA (OUTPATIENT)
Dept: PERIOP | Facility: HOSPITAL | Age: 53
End: 2018-03-30
Payer: COMMERCIAL

## 2018-03-30 ENCOUNTER — ANESTHESIA EVENT (OUTPATIENT)
Dept: PERIOP | Facility: HOSPITAL | Age: 53
End: 2018-03-30
Payer: COMMERCIAL

## 2018-03-30 VITALS
SYSTOLIC BLOOD PRESSURE: 126 MMHG | HEART RATE: 84 BPM | TEMPERATURE: 98.1 F | OXYGEN SATURATION: 97 % | DIASTOLIC BLOOD PRESSURE: 84 MMHG | RESPIRATION RATE: 18 BRPM

## 2018-03-30 PROCEDURE — 28119 REMOVAL OF HEEL SPUR: CPT | Performed by: PODIATRIST

## 2018-03-30 RX ORDER — PROPOFOL 10 MG/ML
INJECTION, EMULSION INTRAVENOUS CONTINUOUS PRN
Status: DISCONTINUED | OUTPATIENT
Start: 2018-03-30 | End: 2018-03-30 | Stop reason: SURG

## 2018-03-30 RX ORDER — FENTANYL CITRATE/PF 50 MCG/ML
25 SYRINGE (ML) INJECTION
Status: DISCONTINUED | OUTPATIENT
Start: 2018-03-30 | End: 2018-03-30 | Stop reason: HOSPADM

## 2018-03-30 RX ORDER — LIDOCAINE HYDROCHLORIDE 10 MG/ML
INJECTION, SOLUTION INFILTRATION; PERINEURAL AS NEEDED
Status: DISCONTINUED | OUTPATIENT
Start: 2018-03-30 | End: 2018-03-30 | Stop reason: SURG

## 2018-03-30 RX ORDER — FENTANYL CITRATE 50 UG/ML
INJECTION, SOLUTION INTRAMUSCULAR; INTRAVENOUS AS NEEDED
Status: DISCONTINUED | OUTPATIENT
Start: 2018-03-30 | End: 2018-03-30 | Stop reason: SURG

## 2018-03-30 RX ORDER — ONDANSETRON 2 MG/ML
4 INJECTION INTRAMUSCULAR; INTRAVENOUS ONCE AS NEEDED
Status: DISCONTINUED | OUTPATIENT
Start: 2018-03-30 | End: 2018-03-30 | Stop reason: HOSPADM

## 2018-03-30 RX ORDER — PROPOFOL 10 MG/ML
INJECTION, EMULSION INTRAVENOUS AS NEEDED
Status: DISCONTINUED | OUTPATIENT
Start: 2018-03-30 | End: 2018-03-30 | Stop reason: SURG

## 2018-03-30 RX ORDER — LIDOCAINE HYDROCHLORIDE AND EPINEPHRINE 10; 10 MG/ML; UG/ML
INJECTION, SOLUTION INFILTRATION; PERINEURAL AS NEEDED
Status: DISCONTINUED | OUTPATIENT
Start: 2018-03-30 | End: 2018-03-30 | Stop reason: HOSPADM

## 2018-03-30 RX ORDER — MIDAZOLAM HYDROCHLORIDE 1 MG/ML
INJECTION INTRAMUSCULAR; INTRAVENOUS AS NEEDED
Status: DISCONTINUED | OUTPATIENT
Start: 2018-03-30 | End: 2018-03-30 | Stop reason: SURG

## 2018-03-30 RX ORDER — BUPIVACAINE HYDROCHLORIDE 5 MG/ML
INJECTION, SOLUTION PERINEURAL AS NEEDED
Status: DISCONTINUED | OUTPATIENT
Start: 2018-03-30 | End: 2018-03-30 | Stop reason: HOSPADM

## 2018-03-30 RX ORDER — LIDOCAINE HYDROCHLORIDE 20 MG/ML
INJECTION, SOLUTION INFILTRATION; PERINEURAL AS NEEDED
Status: DISCONTINUED | OUTPATIENT
Start: 2018-03-30 | End: 2018-03-30 | Stop reason: HOSPADM

## 2018-03-30 RX ADMIN — PROPOFOL 70 MCG/KG/MIN: 10 INJECTION, EMULSION INTRAVENOUS at 14:20

## 2018-03-30 RX ADMIN — CEFAZOLIN SODIUM 1000 MG: 1 SOLUTION INTRAVENOUS at 14:16

## 2018-03-30 RX ADMIN — LIDOCAINE HYDROCHLORIDE 20 MG: 10 INJECTION, SOLUTION INFILTRATION; PERINEURAL at 14:20

## 2018-03-30 RX ADMIN — FENTANYL CITRATE 100 MCG: 50 INJECTION, SOLUTION INTRAMUSCULAR; INTRAVENOUS at 14:20

## 2018-03-30 RX ADMIN — PROPOFOL 100 MG: 10 INJECTION, EMULSION INTRAVENOUS at 14:20

## 2018-03-30 RX ADMIN — MIDAZOLAM HYDROCHLORIDE 2 MG: 1 INJECTION, SOLUTION INTRAMUSCULAR; INTRAVENOUS at 14:12

## 2018-03-30 NOTE — OP NOTE
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]  Patient has chronic heel pain  She has bilateral heel pain  Her right hurt much worse than the left foot  She has pain upon ambulation  No history of trauma  She suffers from post static dyskinesia  She has undergone workup  She has been diagnosed with plantar fascia with heel spur deformity of the right foot  She has tried significant conservative measures but all this is a failed to alleviate her complaint of pain  She would like to try surgical intervention in the hope of alleviating her complaint of pain  She consented to surgery understanding all the possible risks benefits alternatives and complications understand that no guarantees have been given  Patient understands and following could occur but not limited to pain scar swelling hypo or hyper seizure  RSD phlebitis vasculitis  Wound need for wound care infection for infection care neuritis vasculitis calcaneal stress fracture need for future surgery current of problem need for hospitalization to name a few  Operative Findings: Thickened fibrotic plantar fascia insertion of the medial band of the right plantar fascia  Positive plantar calcaneal spurring as well    Complications:   None    Procedure and Technique:  Patient was brought to the OR and placed on the operating table in a supine position  She was prepped and draped in the usual sterile manner    After checking for adequate anesthesia and with the use of ankle tourniquet hemostasis attention was then directed to the medial aspect of the right foot  A 2 cm linear incision was performed on the medial aspect of the heel in the area of the thick and thin skin junction  This would overlie the area of the plantar fascia insertion  The incision was deepened down through subcutaneous layers with all bleeders being bovied and coagulated as necessary  At this point was obvious that there was a thickened plantar fascia  Through sharp and blunt dissection the plantar fascia insertion into the calcaneus was localized  This was the medial and central band into the medial tuberosity  Through sharp blunt dissection this was transected at the area of insertion  There was still plantar calcaneal spurring noted  3 use of kerrie and Brayan nasal rasp ostectomy was performed  It appeared that all abnormal tissue was removed  An reduction deformity had occurred  So after copiously flushing with large amounts of sterile saline closure was attempted  Deep structures reapproximated with 4 0 chromic and the skin with 4 O nylon  During closure tourniquet was released  Vascular status returned within normal preop limits  At this point dry sterile dressing was applied  Posterior splint applied  Patient returned recovery room with vital signs stable neurovascular status intact  Patient is be followed up in the office in 1 week in take the prescribed medication  She will remain nonweightbearing     I was present for the entire procedure    Patient Disposition:  PACU  and intubated and critically ill    SIGNATURE: Savannah Bethea DPM  DATE: March 30, 2018  TIME: 3:19 PM

## 2018-03-30 NOTE — DISCHARGE INSTRUCTIONS
PODIATRY DISCHARGE INSTRUCTIONS  DR [address] Instructions    · No driving or operating machinery for 24 hours or while taking pain medication  · No alcoholic beverages for 24 hours or while taking pain medication  · DO NOT make any important personal or business decisions for 24 hours  Diet:  · Begin with liquids and light food and progress to your normal diet unless you are nauseated or otherwise instructed by your physician  Medication:  · Begin taking pain medication as directed and remember that narcotic medications may be constipating  Consider starting over the counter stool softeners  If constipation persists begin taking over the counter laxative  Dressing:  · Keep dressing dry   Do not remove dressing until seen by Dr Iain Dang:  · Apply ice to affected area 20 minutes on and 20 minutes off unless otherwise         Instructed     Elevation:  · Keep affected foot elevated on pillows    Weight Bearing status:  ___________________________  Wear blue surgical shoe when walking  Use crutches, cane or walker as directed    Keep regular scheduled appointment with Dr Butch Carrasco    Call Dr Butch Carrasco with any problems or questions at [phone]

## 2018-03-30 NOTE — ANESTHESIA PREPROCEDURE EVALUATION
Review of Systems/Medical History  Patient summary reviewed  Chart reviewed  No history of anesthetic complications     Cardiovascular  EKG reviewed, Hyperlipidemia, Hypertension , Dysrhythmias, ,   Comment: LBBB (left bundle branch block),  Pulmonary       GI/Hepatic       Kidney stones,        Endo/Other    Obesity  morbid obesity   GYN       Hematology   Musculoskeletal       Neurology   Psychology           Physical Exam    Airway    Mallampati score: II  TM Distance: >3 FB  Neck ROM: full     Dental   No notable dental hx     Cardiovascular  Cardiovascular exam normal    Pulmonary  Pulmonary exam normal     Other Findings        Anesthesia Plan  ASA Score- 3     Anesthesia Type- IV sedation with anesthesia with ASA Monitors  Additional Monitors:   Airway Plan:         Plan Factors-    Induction-     Postoperative Plan-     Informed Consent- Anesthetic plan and risks discussed with patient  I personally reviewed this patient with the CRNA  Discussed and agreed on the Anesthesia Plan with the CRNA  Elmer Trejo

## 2018-03-30 NOTE — PERIOPERATIVE NURSING NOTE
Received pt to GER from PACU, alert, no complaint of pain, VSS, tolerating po fluids  IV infusing well  Right foot surgical site wrapped, dressing dry and intact  Toes warm with good capillary refill

## 2018-03-30 NOTE — INTERIM OP NOTE
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

## 2018-03-30 NOTE — PERIOPERATIVE NURSING NOTE
Discharge criteria met  IV removed  Instructions given to patient and significant other, all questions answered

## 2018-04-03 ENCOUNTER — OFFICE VISIT (OUTPATIENT)
Dept: PODIATRY | Facility: CLINIC | Age: 53
End: 2018-04-03
Payer: COMMERCIAL

## 2018-04-03 VITALS
SYSTOLIC BLOOD PRESSURE: 126 MMHG | WEIGHT: 205.4 LBS | RESPIRATION RATE: 17 BRPM | BODY MASS INDEX: 37.8 KG/M2 | DIASTOLIC BLOOD PRESSURE: 84 MMHG | HEIGHT: 62 IN

## 2018-04-03 DIAGNOSIS — M77.32 HEEL SPUR, LEFT: ICD-10-CM

## 2018-04-03 DIAGNOSIS — M79.672 LEFT FOOT PAIN: Primary | ICD-10-CM

## 2018-04-03 DIAGNOSIS — M77.52 BURSITIS OF LEFT FOOT: ICD-10-CM

## 2018-04-03 PROCEDURE — 20550 NJX 1 TENDON SHEATH/LIGAMENT: CPT | Performed by: PODIATRIST

## 2018-04-03 RX ORDER — TRAMADOL HYDROCHLORIDE 50 MG/1
TABLET ORAL EVERY 6 HOURS PRN
Refills: 0 | COMMUNITY
Start: 2018-04-01 | End: 2018-10-22 | Stop reason: ALTCHOICE

## 2018-04-03 RX ORDER — ETODOLAC 400 MG/1
400 TABLET, FILM COATED ORAL 2 TIMES DAILY
Qty: 60 TABLET | Refills: 0 | Status: SHIPPED | OUTPATIENT
Start: 2018-04-03 | End: 2019-04-01 | Stop reason: ALTCHOICE

## 2018-04-03 NOTE — PROGRESS NOTES
Procedures   Foot Exam     Patient is doing very well status post surgery  Patient has minimal pain in the right foot  She appears to be using crutches as directed  However they do relate having the patient fall  Her biggest complaint today, his pain in her left heel  She suffers from post static dyskinesia    0  Neurovascular status intact  Right foot dry sterile dressing is dry  There is moderate amount of dried hemorrhage  Bandage removed  Right heel incision coapted  No sign of infection  Left foot presents with pain with palpation plantar fascia insertion  A   Normal course status post right foot surgery  Left foot plantar fasciitis  Plan  Dry sterile dressing on right foot change  Patient remain nonweightbearing on right side  Left foot has been addressed with 1 25 cc of Kenalog 10  Injection was given from medial approach  Patient will take Lodine as directed  She is requesting Percocet  Refill given

## 2018-04-09 ENCOUNTER — OFFICE VISIT (OUTPATIENT)
Dept: FAMILY MEDICINE CLINIC | Facility: CLINIC | Age: 53
End: 2018-04-09
Payer: COMMERCIAL

## 2018-04-09 VITALS
SYSTOLIC BLOOD PRESSURE: 130 MMHG | HEART RATE: 72 BPM | TEMPERATURE: 98.6 F | HEIGHT: 62 IN | DIASTOLIC BLOOD PRESSURE: 88 MMHG | RESPIRATION RATE: 20 BRPM

## 2018-04-09 DIAGNOSIS — Z11.59 NEED FOR HEPATITIS C SCREENING TEST: ICD-10-CM

## 2018-04-09 DIAGNOSIS — M72.2 PLANTAR FIBROMATOSIS: ICD-10-CM

## 2018-04-09 DIAGNOSIS — I10 ESSENTIAL HYPERTENSION: Primary | ICD-10-CM

## 2018-04-09 DIAGNOSIS — Z12.11 COLON CANCER SCREENING: ICD-10-CM

## 2018-04-09 DIAGNOSIS — Z12.31 SCREENING MAMMOGRAM, ENCOUNTER FOR: ICD-10-CM

## 2018-04-09 DIAGNOSIS — R73.01 IMPAIRED FASTING GLUCOSE: ICD-10-CM

## 2018-04-09 PROBLEM — Z01.818 PREOPERATIVE CLEARANCE: Status: RESOLVED | Noted: 2018-03-08 | Resolved: 2018-04-09

## 2018-04-09 PROBLEM — F41.1 GENERALIZED ANXIETY DISORDER: Status: ACTIVE | Noted: 2018-04-09

## 2018-04-09 PROCEDURE — 99213 OFFICE O/P EST LOW 20 MIN: CPT | Performed by: FAMILY MEDICINE

## 2018-04-09 RX ORDER — DULOXETIN HYDROCHLORIDE 30 MG/1
30 CAPSULE, DELAYED RELEASE ORAL DAILY
Qty: 90 CAPSULE | Refills: 1 | Status: SHIPPED | OUTPATIENT
Start: 2018-04-09 | End: 2018-10-22 | Stop reason: DRUGHIGH

## 2018-04-09 RX ORDER — OXYCODONE HYDROCHLORIDE AND ACETAMINOPHEN 5; 325 MG/1; MG/1
TABLET ORAL
Refills: 0 | COMMUNITY
Start: 2018-04-03 | End: 2018-05-23

## 2018-04-09 RX ORDER — AMLODIPINE BESYLATE 10 MG/1
10 TABLET ORAL DAILY
Qty: 90 TABLET | Refills: 1 | Status: SHIPPED | OUTPATIENT
Start: 2018-04-09 | End: 2018-10-22 | Stop reason: SDUPTHER

## 2018-04-09 NOTE — PROGRESS NOTES
Assessment/Plan:    Problem List Items Addressed This Visit     Impaired fasting glucose     Monitoring for signs of diabetes  Will keep working on diet  Recheck labs in 6 months  Relevant Orders    HEMOGLOBIN A1C W/ EAG ESTIMATION    Plantar fibromatosis    Relevant Medications    DULoxetine (CYMBALTA) 30 mg delayed release capsule    Essential hypertension - Primary     Well controlled  Continue amlodipine         Relevant Medications    amLODIPine (NORVASC) 10 mg tablet    Other Relevant Orders    Comprehensive metabolic panel    Lipid Panel with Direct LDL reflex      Other Visit Diagnoses     Screening mammogram, encounter for        Relevant Orders    Mammo screening bilateral w cad    Colon cancer screening        Relevant Orders    Ambulatory referral to Gastroenterology    Need for hepatitis C screening test        Relevant Orders    Hepatitis C antibody          There are no Patient Instructions on file for this visit  Return in about 6 months (around 10/9/2018) for Annual physical     Subjective:      Patient ID: Denise Haque is a 46 y o  female  Chief Complaint   Patient presents with    Follow-up     medications        Her specialist started her on the cymbalta  It is working well for her  Her anxiety is less  She has been watching her pressure at home  It was running high so Dr Rosalinda Corona told her to take 5mg twice a day  The following portions of the patient's history were reviewed and updated as appropriate:  past social history    Review of Systems   Constitutional: Negative for fever           Current Outpatient Prescriptions   Medication Sig Dispense Refill    amLODIPine (NORVASC) 10 mg tablet Take 1 tablet (10 mg total) by mouth daily 90 tablet 1    DULoxetine (CYMBALTA) 30 mg delayed release capsule Take 1 capsule (30 mg total) by mouth daily for 30 days 90 capsule 1    etodolac (LODINE) 400 MG tablet Take 1 tablet (400 mg total) by mouth 2 (two) times a day for 30 days 60 tablet 0    oxyCODONE-acetaminophen (PERCOCET) 5-325 mg per tablet   0    pregabalin (LYRICA) 75 mg capsule Take 50 mg by mouth 3 (three) times a day      traMADol (ULTRAM) 50 mg tablet   0    dexamethasone (DECADRON) 1 mg tablet 1 tab at 11 pm the night before labs 1 tablet 0     No current facility-administered medications for this visit  Objective:    /88   Pulse 72   Temp 98 6 °F (37 °C)   Resp 20   Ht 5' 2" (1 575 m)        Physical Exam   Constitutional: She appears well-developed and well-nourished  HENT:   Head: Normocephalic and atraumatic  Right Ear: External ear normal    Left Ear: External ear normal    Mouth/Throat: Oropharynx is clear and moist    Cardiovascular: Normal rate, regular rhythm and normal heart sounds  Exam reveals no friction rub  No murmur heard  Pulmonary/Chest: Effort normal and breath sounds normal  No respiratory distress  She has no wheezes  She has no rales  Musculoskeletal:   Right foot in boot, using crutches  Nursing note and vitals reviewed               Oliva Schwab DO

## 2018-04-10 ENCOUNTER — OFFICE VISIT (OUTPATIENT)
Dept: PODIATRY | Facility: CLINIC | Age: 53
End: 2018-04-10
Payer: COMMERCIAL

## 2018-04-10 VITALS
DIASTOLIC BLOOD PRESSURE: 96 MMHG | HEIGHT: 62 IN | BODY MASS INDEX: 37.8 KG/M2 | HEART RATE: 80 BPM | RESPIRATION RATE: 18 BRPM | SYSTOLIC BLOOD PRESSURE: 144 MMHG | WEIGHT: 205.4 LBS

## 2018-04-10 DIAGNOSIS — M54.16 RADICULOPATHY OF LUMBAR REGION: ICD-10-CM

## 2018-04-10 DIAGNOSIS — R26.2 DIFFICULTY WALKING: Primary | ICD-10-CM

## 2018-04-10 DIAGNOSIS — M77.52 BURSITIS OF LEFT FOOT: ICD-10-CM

## 2018-04-10 PROCEDURE — 99024 POSTOP FOLLOW-UP VISIT: CPT | Performed by: PODIATRIST

## 2018-04-10 PROCEDURE — 73620 X-RAY EXAM OF FOOT: CPT | Performed by: PODIATRIST

## 2018-04-10 RX ORDER — GABAPENTIN 100 MG/1
100 CAPSULE ORAL 3 TIMES DAILY
Qty: 90 CAPSULE | Refills: 0 | Status: SHIPPED | OUTPATIENT
Start: 2018-04-10 | End: 2018-04-10 | Stop reason: CLARIF

## 2018-04-10 NOTE — PROGRESS NOTES
Procedures   Foot Exam     Patient is doing very well  She has no right foot pain  She is nonweightbearing with crutches  Her biggest complaint is cold toes  In addition she complains of left heel pain  She wants to schedule left heel surgery  She is very happy with right foot surgery  Neurovascular status  4/5 L5 testing  Decreased DTR  This is bilateral  Of note, right foot demonstrates dry sterile dressing  Removed  Right heel incision coapted  No evidence of infection  X-ray  No evidence of calcaneal fracture of right foot  Left foot demonstrates  Plantar calcaneal spurring  Plan  X-rays taken  Patient has been transferred to [facility]   She will ambulate in [facility] on right foot  We will add gabapentin    In addition, we will plan for left foot surgery

## 2018-04-11 ENCOUNTER — TELEPHONE (OUTPATIENT)
Dept: FAMILY MEDICINE CLINIC | Facility: CLINIC | Age: 53
End: 2018-04-11

## 2018-04-12 ENCOUNTER — TELEPHONE (OUTPATIENT)
Dept: FAMILY MEDICINE CLINIC | Facility: CLINIC | Age: 53
End: 2018-04-12

## 2018-04-12 NOTE — TELEPHONE ENCOUNTER
4/12/2018 8:07 AM Returned call to Darya LEE explained that her heart testing is good for 6 months, but since she is getting a second surgery she will need another "clearance"  She will schedule an appointment before her procedure in May    Johan Dyer DO

## 2018-04-12 NOTE — TELEPHONE ENCOUNTER
Darya called and stated that she spoke with Dr Estela Kasper this morning regarding her PreOP clearance for her surgery on May 4 with Dr Sekou Calle  She wants to know if she needs to be cleared again, she just needs a letter  But she stated Dr Estela Kasper and her spoke about it today  Please call Darya if a letter can be generated and sent to Dr Sekou Calle or if she needs to come in for PreOP clearance appt      Darya [phone]

## 2018-04-12 NOTE — TELEPHONE ENCOUNTER
Verbally spoke with Dr Donita Helm before she left office  Called pt and advised her to get a form from Dr Paige Franco and she will fill that out for pt  Pt is going to call over to the office and see if they can fax us over a form   Katina Cuellar

## 2018-04-16 NOTE — TELEPHONE ENCOUNTER
Looked into media in pts chart, see [employer] forms from dr Shine Rivera  Not sure if these are the forms we need for her pre-op  , but I can not access them  I had Kristin try as well and neither could they, I sent a request for to Dr Christina Voss office for them to fax a pre-op form for pts surgery, we received confirmation   Harlan Scruggs, [address] [facility] Meena Arthur

## 2018-04-25 ENCOUNTER — OFFICE VISIT (OUTPATIENT)
Dept: ENDOCRINOLOGY | Facility: CLINIC | Age: 53
End: 2018-04-25
Payer: COMMERCIAL

## 2018-04-25 VITALS
SYSTOLIC BLOOD PRESSURE: 160 MMHG | HEART RATE: 92 BPM | DIASTOLIC BLOOD PRESSURE: 120 MMHG | WEIGHT: 198 LBS | HEIGHT: 62 IN | BODY MASS INDEX: 36.44 KG/M2

## 2018-04-25 DIAGNOSIS — R73.01 IMPAIRED FASTING GLUCOSE: Primary | ICD-10-CM

## 2018-04-25 DIAGNOSIS — E66.9 OBESITY (BMI 35.0-39.9 WITHOUT COMORBIDITY): ICD-10-CM

## 2018-04-25 DIAGNOSIS — L68.0 HIRSUTISM: ICD-10-CM

## 2018-04-25 PROCEDURE — 99213 OFFICE O/P EST LOW 20 MIN: CPT | Performed by: NURSE PRACTITIONER

## 2018-04-25 RX ORDER — DOCUSATE SODIUM 100 MG/1
100 CAPSULE, LIQUID FILLED ORAL 2 TIMES DAILY
COMMUNITY
End: 2018-10-22 | Stop reason: ALTCHOICE

## 2018-04-25 NOTE — PROGRESS NOTES
Established Patient Progress Note       Chief Complaint   Patient presents with    Weight Gain        History of Present Illness:     Emma London is a 46 y o  female with a history of impaired fasting glucose, obesity, and hirsutism  For the impaired fasting glucose and obesity her most recent A1C was 5 8  She reports she has gained approximally 70 lbs since she quit smoking and has been unable to lose the weight  She reports she has meet with the dietician and is following a 1300 calorie meal plan  She reports she rarely eats out, watches her portion sizes, and only has about 45 g of carbs per meal  She has been out of work and unable to exercise do to her heel spur which she is having surgery for on May 4th  She is hopeful she can begin exercising after this surgery         Patient Active Problem List   Diagnosis    Acquired deformity of right foot    Closed nondisplaced fracture of body of right calcaneus    Plantar fasciitis    Right foot pain    Left foot pain    Acquired ankle/foot deformity    Reduced libido    Elevated testosterone level in female    Hirsutism    Hypercholesterolemia    Impaired fasting glucose    Nicotine dependence    Pes planus, congenital    Plantar fibromatosis    Plantar fasciitis of right foot    Heel spur, right    Shortness of breath    LBBB (left bundle branch block)    Essential hypertension    Dyslipidemia    Obesity (BMI 35 0-39 9 without comorbidity)    Abnormal weight gain    Sleep disturbance    Congenital pes planus    Difficulty walking    Plantar fascial fibromatosis    Bursitis of left foot    Heel spur, left    Generalized anxiety disorder    Radiculopathy of lumbar region      Past Medical History:   Diagnosis Date    Apnea 01/22/2009    Arthralgia     last assessed 6/28/13    Depression 10/09/2006    Dysmenorrhea 01/22/2009    Essential hypertension 3/8/2018    Exposure to potentially hazardous body fluids     last assessed 6/1/16  Insomnia 10/09/2006    Kidney stone     20 years ago    Muscle weakness (generalized) 08/17/2006    Viral gastroenteritis     last assessed 6/13/13      Past Surgical History:   Procedure Laterality Date    APPENDECTOMY      last assessed 5/1/17    PLANTAR FASCIA SURGERY Right 3/30/2018    Procedure: Resection heel spur;  Surgeon: Jaimie Thomas DPM;  Location: [facility];  Service: Podiatry    SALPINGECTOMY      had a tubal pregnancy    SALPINGOSTOMY      TONSILLECTOMY        Family History   Problem Relation Age of Onset    Diabetes Mother     Fibromyalgia Mother     Hypertension Father     Hyperthyroidism Father     Stroke Maternal Grandfather      Social History   Substance Use Topics    Smoking status: Former Smoker     Years: 30 00     Quit date: 3/8/2014    Smokeless tobacco: Never Used    Alcohol use No     No Known Allergies    Current Outpatient Prescriptions:     amLODIPine (NORVASC) 10 mg tablet, Take 1 tablet (10 mg total) by mouth daily, Disp: 90 tablet, Rfl: 1    docusate sodium (RA COL-RITE) 100 mg capsule, Take 100 mg by mouth 2 (two) times a day, Disp: , Rfl:     DULoxetine (CYMBALTA) 30 mg delayed release capsule, Take 1 capsule (30 mg total) by mouth daily for 30 days, Disp: 90 capsule, Rfl: 1    etodolac (LODINE) 400 MG tablet, Take 1 tablet (400 mg total) by mouth 2 (two) times a day for 30 days, Disp: 60 tablet, Rfl: 0    oxyCODONE-acetaminophen (PERCOCET) 5-325 mg per tablet, , Disp: , Rfl: 0    pregabalin (LYRICA) 75 mg capsule, Take 50 mg by mouth 3 (three) times a day, Disp: , Rfl:     traMADol (ULTRAM) 50 mg tablet, , Disp: , Rfl: 0    Liraglutide -Weight Management 18 MG/3ML SOPN, Inject 0 1 mL (0 6 mg total) under the skin once a week First week inject 0 6 mg, second week 1 2 mg, third week 1 8 mg, fourth week 2 4 mg, fifth week 3 0 mg, then continue on this dose, Disp: 0 4 mL, Rfl: 2    Review of Systems   Constitutional: Negative for chills and fever     HENT: Negative for sore throat and trouble swallowing  Eyes: Negative for visual disturbance  Respiratory: Negative for shortness of breath  Cardiovascular: Negative for chest pain and palpitations  Gastrointestinal: Negative for abdominal pain, constipation and diarrhea  Endocrine: Negative for cold intolerance, heat intolerance, polydipsia, polyphagia and polyuria  Genitourinary: Negative for frequency  Musculoskeletal: Negative for arthralgias and myalgias  Skin: Negative for rash  Neurological: Negative for dizziness and tremors  Hematological: Negative for adenopathy  Psychiatric/Behavioral: Negative for sleep disturbance  All other systems reviewed and are negative  Physical Exam:  Body mass index is 36 21 kg/m²  BP (!) 160/120   Pulse 92   Ht 5' 2" (1 575 m)   Wt 89 8 kg (198 lb)   BMI 36 21 kg/m²    Wt Readings from Last 3 Encounters:   04/25/18 89 8 kg (198 lb)   04/10/18 93 2 kg (205 lb 6 4 oz)   04/03/18 93 2 kg (205 lb 6 4 oz)       Physical Exam   Constitutional: She is oriented to person, place, and time  She appears well-developed and well-nourished  No distress  HENT:   Head: Normocephalic and atraumatic  Eyes: Conjunctivae are normal  Pupils are equal, round, and reactive to light  Neck: Normal range of motion  Neck supple  No thyromegaly present  Cardiovascular: Normal rate, regular rhythm and normal heart sounds  Pulmonary/Chest: Effort normal and breath sounds normal  No respiratory distress  She has no wheezes  She has no rales  Abdominal: Soft  Bowel sounds are normal  She exhibits no distension  There is no tenderness  Musculoskeletal: Normal range of motion  She exhibits no edema  Neurological: She is alert and oriented to person, place, and time  Skin: Skin is warm and dry  Psychiatric: She has a normal mood and affect  Vitals reviewed        Labs:     Lab Results   Component Value Date    HGBA1C 5 8 (H) 03/06/2018       Lab Results Component Value Date     11/16/2017    K 4 6 11/16/2017    CL 98 11/16/2017    CO2 24 11/16/2017    BUN 21 03/06/2018    CREATININE 0 77 03/06/2018    GLUCOSE 123 (H) 11/16/2017    CALCIUM 9 8 11/16/2017    AST 23 11/16/2017    ALT 33 (H) 11/16/2017    ALKPHOS 85 11/16/2017    PROT 7 4 11/16/2017    BILITOT 0 4 11/16/2017       Lab Results   Component Value Date    CHOL 200 (H) 11/16/2017    HDL 59 11/16/2017    TRIG 237 (H) 11/16/2017       Lab Results   Component Value Date    FREET4 0 92 11/17/2017     Component      Latest Ref Rng & Units 3/15/2018   Cortisol AM      6 2 - 19 4 ug/dL 0 9 (L)       Impression & Plan:    Problem List Items Addressed This Visit     Hirsutism     She would like to hold on starting spironolactone for now         Impaired fasting glucose - Primary     Educated on diet, exercise, and weight loss         Relevant Medications    Liraglutide -Weight Management 18 MG/3ML SOPN    Obesity (BMI 35 0-39 9 without comorbidity)     She has meet with dietician and is working on improving diet and losing weight  She has been having issues with weight loss  Due to this will start [address] [facility]  Reviewed dosing and side effects with patient  Co-pay card give  Relevant Medications    Liraglutide -Weight Management 18 MG/3ML SOPN          No orders of the defined types were placed in this encounter  Patient Instructions   Liraglutide (By injection)   Liraglutide (bdw-n-[id number]-tide)  Treats type 2 diabetes  Also used to help with weight loss in certain patients  Brand Name(s): Saxenda, Victoza   There may be other brand names for this medicine  When This Medicine Should Not Be Used: This medicine is not right for everyone  Do not use it if you had an allergic reaction to liraglutide, you have a multiple endocrine neoplasia syndrome type 2 (MEN 2), or if you or anyone in your family had medullary thyroid cancer  Tell your doctor if you are pregnant or become pregnant while you are using this medicine  How to Use This Medicine:   Injectable  · Your doctor will prescribe your exact dose and tell you how often it should be given  This medicine is given as a shot under the skin of your stomach, thighs, or upper arms  · If you use insulin in addition to this medicine, do not mix them into the same syringe  You may give the shots in the same area (such as your stomach), but do not give the shots right next to each other  · You may be taught how to give your medicine at home  Make sure you understand all instructions before giving yourself an injection  Do not use more medicine or use it more often than your doctor tells you to  · You will be shown the body areas where this shot can be given  Use a different body area each time you give yourself a shot  Keep track of where you give each shot to make sure you rotate body areas  · Use a new needle and syringe each time you inject your medicine  · Never share medicine pens with others under any circumstances  Sharing needles or pens can result in transmission of infection  · Drink extra fluids so you will urinate more often and help prevent kidney problems  · This medicine should come with a Medication Guide  Ask your pharmacist for a copy if you do not have one  · Missed dose: If you miss a dose of this medicine, use it as soon as you remember  Then take your next daily dose as usual on the following day  Never take extra medicine to make up for a missed dose  If you miss a dose for 3 days or more, call your doctor to talk about how to restart your treatment  · Store your new, unused medicine pen in the refrigerator, in the original carton, and protect it from light  Do not freeze this medicine, and do not use the medicine if it has been frozen  You may store the opened medicine pen in the refrigerator or at room temperature for 30 days  Throw away your used pen after 30 days, even if it still has medicine in it   Remove the needle from the pen before you store it  · Throw away used needles in a hard, closed container that the needles cannot poke through  Keep this container away from children and pets  Drugs and Foods to Avoid:      Ask your doctor or pharmacist before using any other medicine, including over-the-counter medicines, vitamins, and herbal products  Warnings While Using This Medicine:   · Tell your doctor if you are breastfeeding, or if you have kidney disease, liver disease, digestion problems (such as gastroparesis), gallbladder disease, or a history of pancreas problems, depression, or angioedema (swelling of the arms, face, hands, mouth, or throat)  · Do not use Saxenda® if you are also using Victoza®  They contain the same medicine  · This medicine may cause the following problems:   ¨ An increased risk of thyroid tumor  ¨ Pancreatitis  ¨ Low blood sugar  ¨ Gallbladder problems, including gallstones (Saxenda®)  ¨ Thoughts of hurting yourself Melissa Hanley)  · Your doctor will do lab tests at regular visits to check on the effects of this medicine  Keep all appointments  · Keep all medicine out of the reach of children  Never share your medicine with anyone    Possible Side Effects While Using This Medicine:   Call your doctor right away if you notice any of these side effects:  · Allergic reaction: Itching or hives, swelling in your face or hands, swelling or tingling in your mouth or throat, chest tightness, trouble breathing  · Change in how much or how often you urinate, or painful or burning urination  · Fast or racing heartbeat  · Feeling sad or depressed, thoughts of suicide, unusual changes in mood or behavior  · Shaking, trembling, sweating, fast or pounding heartbeat, fainting, hunger, confusion  · Sudden and severe stomach pain, nausea, vomiting, fever, and lightheadedness  · Trouble breathing or swallowing, a lump in your neck, hoarseness when speaking  · Yellow skin or eyes  If you notice these less serious side effects, talk with your doctor:   · Decreased appetite  · Dizziness or headache  · Nausea, diarrhea, constipation, or upset stomach  · Redness, itching, swelling, or any changes in your skin where the shot was given  If you notice other side effects that you think are caused by this medicine, tell your doctor  Call your doctor for medical advice about side effects  You may report side effects to FDA at 5-599-FDA-6066  © 2017 [address]  Information is for End User's use only and may not be sold, redistributed or otherwise used for commercial purposes  The above information is an  only  It is not intended as medical advice for individual conditions or treatments  Talk to your doctor, nurse or pharmacist before following any medical regimen to see if it is safe and effective for you  Discussed with the patient and all questioned fully answered  She will call me if any problems arise  Follow-up appointment in 6 months       Counseled patient on diagnostic results, prognosis, risk and benefit of treatment options, instruction for management, importance of treatment compliance, Risk  factor reduction and impressions      Dinorah Ignacio [address]

## 2018-04-26 ENCOUNTER — OFFICE VISIT (OUTPATIENT)
Dept: PODIATRY | Facility: CLINIC | Age: 53
End: 2018-04-26
Payer: COMMERCIAL

## 2018-04-26 VITALS
HEIGHT: 62 IN | SYSTOLIC BLOOD PRESSURE: 142 MMHG | WEIGHT: 198 LBS | BODY MASS INDEX: 36.44 KG/M2 | DIASTOLIC BLOOD PRESSURE: 92 MMHG

## 2018-04-26 DIAGNOSIS — M21.962 ACQUIRED DEFORMITY OF LEFT FOOT: ICD-10-CM

## 2018-04-26 DIAGNOSIS — E66.9 OBESITY (BMI 35.0-39.9 WITHOUT COMORBIDITY): ICD-10-CM

## 2018-04-26 DIAGNOSIS — M72.2 PLANTAR FASCIITIS: ICD-10-CM

## 2018-04-26 DIAGNOSIS — M54.16 RADICULOPATHY OF LUMBAR REGION: ICD-10-CM

## 2018-04-26 DIAGNOSIS — R73.01 IMPAIRED FASTING GLUCOSE: ICD-10-CM

## 2018-04-26 DIAGNOSIS — M79.672 LEFT FOOT PAIN: Primary | ICD-10-CM

## 2018-04-26 PROCEDURE — 99212 OFFICE O/P EST SF 10 MIN: CPT | Performed by: PODIATRIST

## 2018-04-26 PROCEDURE — 73620 X-RAY EXAM OF FOOT: CPT | Performed by: PODIATRIST

## 2018-04-26 PROCEDURE — 20550 NJX 1 TENDON SHEATH/LIGAMENT: CPT | Performed by: PODIATRIST

## 2018-04-26 NOTE — ASSESSMENT & PLAN NOTE
She has meet with dietician and is working on improving diet and losing weight  She has been having issues with weight loss  Due to this will start [address]  Reviewed dosing and side effects with patient  Co-pay card give

## 2018-04-26 NOTE — PROGRESS NOTES
Assessment/Plan:  Fibromyalgia  Radiculopathy  Left plantar fasciitis  Left heel spur  Plan  Sutures removed right foot  X-rays taken of left foot  Left foot trigger point injection done  1 25 cc of Kenalog 10 injected without pain or complication  Patient will consider surgery  She  may have to cancel  There are no diagnoses linked to this encounter  Subjective:     Patient ID: Royal Gann is a 46 y o  female  Patient is doing much better  Patient has less right heel pain  She is now taking Cymbalta  She has much less pain in the left heel  Suture / Staple Removal         The following portions of the patient's history were reviewed and updated as appropriate: allergies, current medications, past family history, past medical history, past social history, past surgical history and problem list     Review of Systems      Objective: Foot Exam    General  General Appearance: appears stated age and healthy   Orientation: alert and oriented to person, place, and time   Affect: appropriate   Gait: unimpaired       Right Foot/Ankle     Comments  Incision of right heel is coapted  Sutures removed  No evidence of infection  Left Foot/Ankle      Comments  Minimal pain with palpation left heel  Physical Exam   Constitutional: She appears well-developed and well-nourished  Musculoskeletal:   X-ray of left foot demonstrates plantar calcaneal spurring

## 2018-04-26 NOTE — PATIENT INSTRUCTIONS
Liraglutide (By injection)   Liraglutide (abc-p-PWYG-tide)  Treats type 2 diabetes  Also used to help with weight loss in certain patients  Brand Name(s): Saxenda, Victoza   There may be other brand names for this medicine  When This Medicine Should Not Be Used: This medicine is not right for everyone  Do not use it if you had an allergic reaction to liraglutide, you have a multiple endocrine neoplasia syndrome type 2 (MEN 2), or if you or anyone in your family had medullary thyroid cancer  Tell your doctor if you are pregnant or become pregnant while you are using this medicine  How to Use This Medicine:   Injectable  · Your doctor will prescribe your exact dose and tell you how often it should be given  This medicine is given as a shot under the skin of your stomach, thighs, or upper arms  · If you use insulin in addition to this medicine, do not mix them into the same syringe  You may give the shots in the same area (such as your stomach), but do not give the shots right next to each other  · You may be taught how to give your medicine at home  Make sure you understand all instructions before giving yourself an injection  Do not use more medicine or use it more often than your doctor tells you to  · You will be shown the body areas where this shot can be given  Use a different body area each time you give yourself a shot  Keep track of where you give each shot to make sure you rotate body areas  · Use a new needle and syringe each time you inject your medicine  · Never share medicine pens with others under any circumstances  Sharing needles or pens can result in transmission of infection  · Drink extra fluids so you will urinate more often and help prevent kidney problems  · This medicine should come with a Medication Guide  Ask your pharmacist for a copy if you do not have one  · Missed dose: If you miss a dose of this medicine, use it as soon as you remember   Then take your next daily dose as usual on the following day  Never take extra medicine to make up for a missed dose  If you miss a dose for 3 days or more, call your doctor to talk about how to restart your treatment  · Store your new, unused medicine pen in the refrigerator, in the original carton, and protect it from light  Do not freeze this medicine, and do not use the medicine if it has been frozen  You may store the opened medicine pen in the refrigerator or at room temperature for 30 days  Throw away your used pen after 30 days, even if it still has medicine in it  Remove the needle from the pen before you store it  · Throw away used needles in a hard, closed container that the needles cannot poke through  Keep this container away from children and pets  Drugs and Foods to Avoid:      Ask your doctor or pharmacist before using any other medicine, including over-the-counter medicines, vitamins, and herbal products  Warnings While Using This Medicine:   · Tell your doctor if you are breastfeeding, or if you have kidney disease, liver disease, digestion problems (such as gastroparesis), gallbladder disease, or a history of pancreas problems, depression, or angioedema (swelling of the arms, face, hands, mouth, or throat)  · Do not use Saxenda® if you are also using Victoza®  They contain the same medicine  · This medicine may cause the following problems:   ¨ An increased risk of thyroid tumor  ¨ Pancreatitis  ¨ Low blood sugar  ¨ Gallbladder problems, including gallstones (Saxenda®)  ¨ Thoughts of hurting yourself Kathleen Michel)  · Your doctor will do lab tests at regular visits to check on the effects of this medicine  Keep all appointments  · Keep all medicine out of the reach of children  Never share your medicine with anyone    Possible Side Effects While Using This Medicine:   Call your doctor right away if you notice any of these side effects:  · Allergic reaction: Itching or hives, swelling in your face or hands, swelling or tingling in your mouth or throat, chest tightness, trouble breathing  · Change in how much or how often you urinate, or painful or burning urination  · Fast or racing heartbeat  · Feeling sad or depressed, thoughts of suicide, unusual changes in mood or behavior  · Shaking, trembling, sweating, fast or pounding heartbeat, fainting, hunger, confusion  · Sudden and severe stomach pain, nausea, vomiting, fever, and lightheadedness  · Trouble breathing or swallowing, a lump in your neck, hoarseness when speaking  · Yellow skin or eyes  If you notice these less serious side effects, talk with your doctor:   · Decreased appetite  · Dizziness or headache  · Nausea, diarrhea, constipation, or upset stomach  · Redness, itching, swelling, or any changes in your skin where the shot was given  If you notice other side effects that you think are caused by this medicine, tell your doctor  Call your doctor for medical advice about side effects  You may report side effects to FDA at 1-748-FDA-9739  © 2017 [address] Information is for End User's use only and may not be sold, redistributed or otherwise used for commercial purposes  The above information is an  only  It is not intended as medical advice for individual conditions or treatments  Talk to your doctor, nurse or pharmacist before following any medical regimen to see if it is safe and effective for you

## 2018-04-27 ENCOUNTER — DOCUMENTATION (OUTPATIENT)
Dept: ENDOCRINOLOGY | Facility: CLINIC | Age: 53
End: 2018-04-27

## 2018-04-30 ENCOUNTER — TELEPHONE (OUTPATIENT)
Dept: ENDOCRINOLOGY | Facility: CLINIC | Age: 53
End: 2018-04-30

## 2018-04-30 ENCOUNTER — TELEPHONE (OUTPATIENT)
Dept: PODIATRY | Facility: CLINIC | Age: 53
End: 2018-04-30

## 2018-04-30 DIAGNOSIS — M79.672 LEFT FOOT PAIN: Primary | ICD-10-CM

## 2018-04-30 RX ORDER — LEVOFLOXACIN 500 MG/1
500 TABLET, FILM COATED ORAL EVERY 24 HOURS
Qty: 7 TABLET | Refills: 0 | Status: ON HOLD | OUTPATIENT
Start: 2018-04-30 | End: 2018-05-04

## 2018-04-30 RX ORDER — HYDROXYZINE HYDROCHLORIDE 25 MG/1
25 TABLET, FILM COATED ORAL 3 TIMES DAILY
Qty: 9 TABLET | Refills: 0 | Status: ON HOLD | OUTPATIENT
Start: 2018-04-30 | End: 2018-05-04

## 2018-04-30 NOTE — PRE-PROCEDURE INSTRUCTIONS
My Surgical Experience    The following information was developed to assist you to prepare for your operation  What do I need to do before coming to the hospital?   Arrange for a responsible person to drive you to and from the hospital    Arrange care for your children at home  Children are not allowed in the recovery areas of the hospital   Plan to wear clothing that is easy to put on and take off  If you are having shoulder surgery, wear a shirt that buttons or zippers in the front  Bathing  o Shower the evening before and the morning of your surgery with an antibacterial soap  Please refer to the Pre Op Showering Instructions for Surgery Patients Sheet   o Remove nail polish and all body piercing jewelry  o Do not shave any body part for at least 24 hours before surgery-this includes face, arms, legs and upper body  Food  o Nothing to eat or drink after midnight the night before your surgery  This includes candy and chewing gum  o Exception: If your surgery is after 12:00pm (noon), you may have clear liquids such as 7-Up®, ginger ale, apple or cranberry juice, Jell-O®, water, or clear broth until 8:00 am  o Do not drink milk or juice with pulp on the morning before surgery  o Do not drink alcohol 24 hours before surgery  Medicine  o Follow instructions you received from your surgeon about which medicines you may take on the day of surgery  o If instructed to take medicine on the morning of surgery, take pills with just a small sip of water  Call your prescribing doctor for specific infroamtion on what to do if you take insulin    What should I bring to the hospital?    Bring:  Gricelda  or a walker, if you have them, for foot or knee surgery   A list of the daily medicines, vitamins, minerals, herbals and nutritional supplements you take   Include the dosages of medicines and the time you take them each day   Glasses, dentures or hearing aids   Minimal clothing; you will be wearing hospital sleepwear   Photo ID; required to verify your identity   If you have a Living Will or Power of , bring a copy of the documents   If you have an ostomy, bring an extra pouch and any supplies you use    Do not bring   Medicines or inhalers   Money, valuables or jewelry    What other information should I know about the day of surgery?  Notify your surgeons if you develop a cold, sore throat, cough, fever, rash or any other illness   Report to the Ambulatory Surgical/Same Day Surgery Unit   You will be instructed to stop at Registration only if you have not been pre-registered   Inform your  fi they do not stay that they will be asked by the staff to leave a phone number where they can be reached   Be available to be reached before surgery  In the event the operating room schedule changes, you may be asked to come in earlier or later than expected    *It is important to tell your doctor and others involved in your health care if you are taking or have been taking any non-prescription drugs, vitamins, minerals, herbals or other nutritional supplements  Any of these may interact with some food or medicines and cause a reaction      Pre-Surgery Instructions:   Medication Instructions    amLODIPine (NORVASC) 10 mg tablet Instructed patient per Anesthesia Guidelines   docusate sodium (RA COL-RITE) 100 mg capsule Instructed patient per Anesthesia Guidelines   DULoxetine (CYMBALTA) 30 mg delayed release capsule Instructed patient per Anesthesia Guidelines   etodolac (LODINE) 400 MG tablet Instructed patient per Anesthesia Guidelines   oxyCODONE-acetaminophen (PERCOCET) 5-325 mg per tablet Instructed patient per Anesthesia Guidelines   traMADol (ULTRAM) 50 mg tablet Instructed patient per Anesthesia Guidelines  To  Take norvasc and cymbalta a m   Of surgery

## 2018-05-01 ENCOUNTER — TELEPHONE (OUTPATIENT)
Dept: ENDOCRINOLOGY | Facility: CLINIC | Age: 53
End: 2018-05-01

## 2018-05-01 ENCOUNTER — TELEPHONE (OUTPATIENT)
Dept: FAMILY MEDICINE CLINIC | Facility: CLINIC | Age: 53
End: 2018-05-01

## 2018-05-01 NOTE — TELEPHONE ENCOUNTER
Spoke to prior authorization line [id number] and they are sending a form for asxenda says it not on pt's formulary but not sure about the liraglutide they don't see it as a generic, asked to fill out form and go from there to see if medication will be covered

## 2018-05-01 NOTE — TELEPHONE ENCOUNTER
PATIENT dropped off PreOP clearance paperwork from Dr Garret Morales office  She stated she has a clearance with Dr Ivonne Cook already, and that Dr Ivonne Cook just needed to fill the paperwork out  Please fax back to Dr Garret Morales office      Form at nurses station

## 2018-05-03 ENCOUNTER — TELEPHONE (OUTPATIENT)
Dept: ENDOCRINOLOGY | Facility: CLINIC | Age: 53
End: 2018-05-03

## 2018-05-03 NOTE — TELEPHONE ENCOUNTER
Called about application for medication and was being processed then got a denial letter, juaquin said she will talk to Dr Raina Bowling when she comes back from vacation about the medication and what else could be done

## 2018-05-04 ENCOUNTER — ANESTHESIA (OUTPATIENT)
Dept: PERIOP | Facility: HOSPITAL | Age: 53
End: 2018-05-04
Payer: COMMERCIAL

## 2018-05-04 ENCOUNTER — HOSPITAL ENCOUNTER (OUTPATIENT)
Facility: HOSPITAL | Age: 53
Setting detail: OUTPATIENT SURGERY
Discharge: HOME | End: 2018-05-04
Attending: PODIATRIST | Admitting: PODIATRIST
Payer: COMMERCIAL

## 2018-05-04 ENCOUNTER — ANESTHESIA EVENT (OUTPATIENT)
Dept: PERIOP | Facility: HOSPITAL | Age: 53
End: 2018-05-04
Payer: COMMERCIAL

## 2018-05-04 VITALS
DIASTOLIC BLOOD PRESSURE: 62 MMHG | TEMPERATURE: 97.6 F | OXYGEN SATURATION: 97 % | SYSTOLIC BLOOD PRESSURE: 100 MMHG | BODY MASS INDEX: 36.21 KG/M2 | HEART RATE: 74 BPM | RESPIRATION RATE: 20 BRPM | WEIGHT: 198 LBS

## 2018-05-04 PROCEDURE — 28119 REMOVAL OF HEEL SPUR: CPT | Performed by: PODIATRIST

## 2018-05-04 RX ORDER — LIDOCAINE HYDROCHLORIDE AND EPINEPHRINE 10; 10 MG/ML; UG/ML
INJECTION, SOLUTION INFILTRATION; PERINEURAL AS NEEDED
Status: DISCONTINUED | OUTPATIENT
Start: 2018-05-04 | End: 2018-05-04 | Stop reason: HOSPADM

## 2018-05-04 RX ORDER — PROPOFOL 10 MG/ML
INJECTION, EMULSION INTRAVENOUS AS NEEDED
Status: DISCONTINUED | OUTPATIENT
Start: 2018-05-04 | End: 2018-05-04 | Stop reason: SURG

## 2018-05-04 RX ORDER — FENTANYL CITRATE 50 UG/ML
INJECTION, SOLUTION INTRAMUSCULAR; INTRAVENOUS AS NEEDED
Status: DISCONTINUED | OUTPATIENT
Start: 2018-05-04 | End: 2018-05-04 | Stop reason: SURG

## 2018-05-04 RX ORDER — BUPIVACAINE HYDROCHLORIDE 5 MG/ML
INJECTION, SOLUTION PERINEURAL AS NEEDED
Status: DISCONTINUED | OUTPATIENT
Start: 2018-05-04 | End: 2018-05-04 | Stop reason: HOSPADM

## 2018-05-04 RX ORDER — SODIUM CHLORIDE, SODIUM LACTATE, POTASSIUM CHLORIDE, CALCIUM CHLORIDE 600; 310; 30; 20 MG/100ML; MG/100ML; MG/100ML; MG/100ML
75 INJECTION, SOLUTION INTRAVENOUS CONTINUOUS
Status: DISCONTINUED | OUTPATIENT
Start: 2018-05-04 | End: 2018-05-04 | Stop reason: HOSPADM

## 2018-05-04 RX ORDER — MIDAZOLAM HYDROCHLORIDE 1 MG/ML
INJECTION INTRAMUSCULAR; INTRAVENOUS AS NEEDED
Status: DISCONTINUED | OUTPATIENT
Start: 2018-05-04 | End: 2018-05-04 | Stop reason: SURG

## 2018-05-04 RX ORDER — LIDOCAINE HYDROCHLORIDE 20 MG/ML
INJECTION, SOLUTION INFILTRATION; PERINEURAL AS NEEDED
Status: DISCONTINUED | OUTPATIENT
Start: 2018-05-04 | End: 2018-05-04 | Stop reason: HOSPADM

## 2018-05-04 RX ORDER — PROPOFOL 10 MG/ML
INJECTION, EMULSION INTRAVENOUS CONTINUOUS PRN
Status: DISCONTINUED | OUTPATIENT
Start: 2018-05-04 | End: 2018-05-04 | Stop reason: SURG

## 2018-05-04 RX ADMIN — MIDAZOLAM HYDROCHLORIDE 2 MG: 1 INJECTION, SOLUTION INTRAMUSCULAR; INTRAVENOUS at 13:05

## 2018-05-04 RX ADMIN — CEFAZOLIN SODIUM 1000 MG: 1 SOLUTION INTRAVENOUS at 13:03

## 2018-05-04 RX ADMIN — SODIUM CHLORIDE, SODIUM LACTATE, POTASSIUM CHLORIDE, AND CALCIUM CHLORIDE: .6; .31; .03; .02 INJECTION, SOLUTION INTRAVENOUS at 13:03

## 2018-05-04 RX ADMIN — FENTANYL CITRATE 100 MCG: 50 INJECTION, SOLUTION INTRAMUSCULAR; INTRAVENOUS at 13:05

## 2018-05-04 RX ADMIN — PROPOFOL 100 MG: 10 INJECTION, EMULSION INTRAVENOUS at 13:05

## 2018-05-04 RX ADMIN — PROPOFOL 100 MCG/KG/MIN: 10 INJECTION, EMULSION INTRAVENOUS at 13:05

## 2018-05-04 NOTE — ANESTHESIA PREPROCEDURE EVALUATION
Acquired ankle/foot deformity   Reduced libido   Elevated testosterone level in female   Hirsutism   Hypercholesterolemia   Impaired fasting glucose   Nicotine dependence   Pes planus, congenital   Plantar fibromatosis   Plantar fasciitis of right foot   Heel spur, right   Shortness of breath   LBBB (left bundle branch block)   Essential hypertension   Dyslipidemia   Obesity (BMI 35 0-39 9 without comorbidity)   Abnormal weight gain   Sleep disturbance   Congenital pes planus   Difficulty walking   Plantar fascial fibromatosis   Bursitis of left foot   Heel spur, left   Generalized anxiety disorder   Radiculopathy of lumbar region   Acquired deformity of left foot       Review of Systems/Medical History  Patient summary reviewed  Chart reviewed      Cardiovascular  Hyperlipidemia, Hypertension , Dysrhythmias ,   Comment: NM myocardial perfusion spect (rx stress and/or rest)   Status: Final result  PACS Images     Show images for NM myocardial perfusion spect (rx stress and/or rest)  Order Report      Order Details   Study Result     [address]  [address]  [address] [facility] [facility]  [phone]     Rest/Stress Gated SPECT Myocardial Perfusion Imaging After Regadenoson     Patient: Kalin Corrales  MR number: [id number]  Account number: [de-identified]  : 1965  Age: 46 years  Gender: Female  Status: Outpatient  Location: Stress lab  Height: 62 in  Weight: 211 lb  BP: 179/ 100 mmHg     Allergies: NO KNOWN ALLERGIES     Diagnosis: I44 7 - Left bundle-branch block, unspecified, R06 02 - Shortness of breath, Z01 818 - Encounter for other preprocedural examination     Primary Physician:  Anish Mauro DO  Technician:  Pat Wisdom  RN:  BIN John  Referring Physician:  Kian Cabrera MD  Group:  [facility]  Report Prepared By[de-identified]  BIN John  Interpreting Physician:  Oscar Antonio MD     INDICATIONS: Evaluation for coronary artery disease      HISTORY: The patient is a 46 year old  female  Chest pain status: no chest pain  Other symptoms: dyspnea  Coronary artery disease risk factors: hypertension and family history of premature coronary artery disease  Cardiovascular  history: arrhythmia- LBBB  Co-morbidity: obesity  Medications: a calcium channel blocker  Previous test results: abnormal ECG      PHYSICAL EXAM: Baseline physical exam screening:Patient with heel spur , unable to walk on the treadmill for stress test, no wheezes audible      REST ECG: Normal sinus rhythm  The ECG showed left bundle branch block      PROCEDURE: The study was performed in the stress lab  A regadenoson infusion pharmacologic stress test was performed  Gated SPECT myocardial perfusion imaging was performed after stress and at rest  Systolic blood pressure was 179 mmHg, at  the start of the study  Diastolic blood pressure was 100 mmHg, at the start of the study  The heart rate was 71 bpm, at the start of the study  IV double checked  Regadenoson protocol:  Time HR bpm SBP mmHg DBP mmHg Symptoms Rhythm/conduct  Baseline 09:16 71 179 100 none NSR, LBBB  Immediate 09:19 94 154 84 flushing same as above  1 min 09:20 96 161 90 subsiding --  2 min 09:21 93 163 100 none --  3 min 09:22 88 166 87 none --  4 min 09:23 87 157 92 none --  No medications or fluids given      STRESS SUMMARY: Duration of pharmacologic stress was 3 min  Maximal heart rate during stress was 96 bpm  The heart rate response to stress was normal  There was resting hypertension with an appropriate blood pressure response to stress  The rate-pressure product for the peak heart rate and blood pressure was 64944  There was no chest pain during stress  The stress test was terminated due to protocol completion  Pre oxygen saturation: 98 %  Peak oxygen saturation: 98 %  The stress ECG was equivocal for ischemia   There were no stress arrhythmias or conduction abnormalities      ISOTOPE ADMINISTRATION:  Resting isotope administration Stress isotope administration  Agent Tetrofosmin Tetrofosmin  Dose 10 8 mCi 32 1 mCi  Date 03/16/2018 03/16/2018     The radiopharmaceutical was injected at the peak effect of pharmacologic stress      MYOCARDIAL PERFUSION IMAGING:  The image quality was good  Rotating projection images reveal prone imaging comopleted for attenuation correction  Left ventricular size was normal  The TID ratio was 1 2      PERFUSION DEFECTS:  -  There was a small, partially reversible myocardial perfusion defect of the apical wall likely due to low counts  -  There was a small to moderate, partially reversible myocardial perfusion defect of the inferoseptal wall likely due to reconstruction artifact      GATED SPECT:  The calculated left ventricular ejection fraction was 56 %  There was paradoxical motion of the interventricular septum      SUMMARY:  -  Stress results: There was resting hypertension with an appropriate blood pressure response to stress  There was no chest pain during stress  -  ECG conclusions: The stress ECG was equivocal for ischemia  -  Perfusion imaging: The TID ratio was 1 2  There was a small, partially reversible myocardial perfusion defect of the apical wall likely due to low counts  There was a small to moderate, partially reversible myocardial perfusion defect  of the inferoseptal wall likely due to reconstruction artifact   -  Gated SPECT: The calculated left ventricular ejection fraction was 56 %    -  Impressions and recommendations: Abnormal study after pharmacologic vasodilation      IMPRESSIONS: Abnormal study after pharmacologic vasodilation      Prepared and signed by  Michael Jain MD  Signed 03/18/2018 12:08:08     Linked Documents     View Image  Imaging     NM myocardial perfusion spect (rx stress and/or rest) (Order #[id number]) on 3/16/2018 - Imaging Information   Result History     NM myocardial perfusion spect (rx stress and/or rest) (Order #[id number]) on 3/18/2018 - Order Result History Report  Result Notes     Notes Recorded by Susan Rondon MD on 3/18/2018 at 8:28 PM EDT  Patient has abnormal stress test  Keep appointment, no rush       Signed by     Signed Date/Time  Phone Pager  MARY QIU 3/18/2018 12:08 [phone]     Echo complete with contrast if indicated   Status: Final result  PACS Images     Show images for Echo complete with contrast if indicated  Order Report      Order Details   Study Result     [address]  [address] [facility]  [place] [address]  [phone]     Transthoracic Echocardiogram  2D, M-mode, Doppler, and Color Doppler     Study date:  16-Mar-2018     Patient: Magalis Lopez  MR number: [id number]  Account number: [de-identified]  : 1965  Age: 46 years  Gender: Female  Status: Routine  Location: Echo lab  Height: 62 in  Weight: 205 5 lb  BP: 153/ 83 mmHg     Indications: Shortness of breath      Diagnoses: R06 00 - Dyspnea, unspecified     Sonographer:  LUIGI Arroyo  Primary Physician:  Jaja Marx DO  Referring Physician:  Susan Rondon MD  Group:  [facility]  Interpreting Physician:  Ely Hamman, MD     SUMMARY     LEFT VENTRICLE:  Systolic function was normal by Teichholz  Ejection fraction was estimated in the range of 50 % to 55 % to be 56 %  There were no regional wall motion abnormalities  Wall thickness was mildly increased      VENTRICULAR SEPTUM:  There was paradoxical motion  These changes are consistent with LBBB      HISTORY: PRIOR HISTORY: HTN, Dyslipidemia, obesity     PROCEDURE: The procedure was performed in the echo lab  This was a routine study  The transthoracic approach was used  The study included complete 2D imaging, M-mode, complete spectral Doppler, and color Doppler  The heart rate was 73 bpm,  at the start of the study  Images were obtained from the parasternal, apical, subcostal, and suprasternal notch acoustic windows   Echocardiographic views were limited due to restricted patient mobility, poor patient compliance, poor  acoustic window availability, and decreased penetration  This was a technically difficult study      LEFT VENTRICLE: Size was normal  Systolic function was normal by Teichholz  Ejection fraction was estimated in the range of 50 % to 55 % to be 56 %  There were no regional wall motion abnormalities  Wall thickness was mildly increased  No  evidence of apical thrombus  DOPPLER: Left ventricular diastolic function parameters were normal      VENTRICULAR SEPTUM: There was paradoxical motion  These changes are consistent with LBBB      RIGHT VENTRICLE: The size was normal  Systolic function was normal  Wall thickness was normal      LEFT ATRIUM: Size was normal      RIGHT ATRIUM: Size was normal      MITRAL VALVE: Valve structure was normal  There was normal leaflet separation  DOPPLER: The transmitral velocity was within the normal range  There was no evidence for stenosis  There was no significant regurgitation      AORTIC VALVE: The valve was trileaflet  Leaflets exhibited normal thickness and normal cuspal separation  DOPPLER: Transaortic velocity was within the normal range  There was no evidence for stenosis  There was no significant  regurgitation      TRICUSPID VALVE: The valve structure was normal  There was normal leaflet separation  DOPPLER: The transtricuspid velocity was within the normal range  There was no evidence for stenosis  There was no significant regurgitation      PULMONIC VALVE: Leaflets exhibited normal thickness, no calcification, and normal cuspal separation  DOPPLER: The transpulmonic velocity was within the normal range  There was no significant regurgitation      PERICARDIUM: There was no pericardial effusion   The pericardium was normal in appearance      AORTA: The root exhibited normal size      SYSTEMIC VEINS: IVC: The inferior vena cava was normal in size      SYSTEM MEASUREMENT TABLES     2D mode  AoR Diam 2D: 2 8 cm  LA Diam (2D): 3 6 cm  LA/Ao (2D): 1 29  FS (2D Teich): 24 8 %  IVSd (2D): 1 12 cm  LVDEV: 51 2 cm³  LVEDV MOD BP: 113 cm³  LVESV: 25 6 cm³  LVIDd(2D): 3 51 cm  LVISd (2D): 2 64 cm  LVOT Area 2D: 3 14 cm squared  LVPWd (2D): 1 08 cm  SV (Teich): 25 6 cm³     Apical four chamber  LVEF A4C: 55 %     Apical two chamber  LA Area: 17 6 cm squared  LA Volume: 45 cm³  LVEF A2C: 56 %     Unspecified Scan Mode  KOLBY Cont Eq (Peak Isra): 2 43 cm squared  LVOT Diam : 2 cm  LVOT Vmax: 1300 mm/s  LVOT Vmax; Mean: 1300 mm/s  Peak Grad ; Mean: 7 mm(Hg)  MV Peak A Isra: 814 mm/s  MV Peak E Isra   Mean: 765 mm/s  MVA (PHT): 4 89 cm squared  PHT: 45 ms  RA Area: 12 2 cm squared  RA Volume: 27 cm³  TAPSE: 1 9 cm     Inters\[facility] [place] [facility]\"" Commission Accredited Echocardiography Laboratory     Prepared and electronically signed by  Kole Chavez MD  Signed 18-Mar-2018 12:25:07     Linked Documents     View Image  Imaging     Echo complete with contrast if indicated (Order #[id number]) on 3/16/2018 - Imaging Information   Result History     Echo complete with contrast if indicated (Order #[id number]) on 3/18/2018 - Order Result History Report  Result Notes     Notes Recorded by Moses Pate MD on 3/18/2018 at 7:16 PM EDT  Patient's echo shows normal LV systolic function   No significant valvular disease   Patient can keep an appointment        Signed by     Signed Date/Time  Phone Pager  MARY QIU 3/18/2018 12:25 [phone]   Exam Information     Status Exam Begun  Exam Ended   Final (99) 3/16/2018 07:40 3/16/2018 08:15    ,  Pulmonary  Smoker ex-smoker  , Shortness of breath,        GI/Hepatic       Kidney stones,        Endo/Other     GYN       Hematology   Musculoskeletal       Neurology   Psychology   Depression ,              Physical Exam    Airway    Mallampati score: II  TM Distance: >3 FB  Neck ROM: full     Dental   lower dentures,     Cardiovascular  Rhythm: regular, Rate: normal,     Pulmonary  Breath sounds clear to auscultation, Other Findings        Anesthesia Plan  ASA Score- 3     Anesthesia Type- IV sedation with anesthesia and general with ASA Monitors  Additional Monitors:   Airway Plan:         Plan Factors-    Induction- intravenous  Postoperative Plan-     Informed Consent- Anesthetic plan and risks discussed with patient  I personally reviewed this patient with the CRNA  Discussed and agreed on the Anesthesia Plan with the CRNA  Katie Kim

## 2018-05-04 NOTE — DISCHARGE INSTR - AVS FIRST PAGE
PODIATRY DISCHARGE INSTRUCTIONS  DR [address] Instructions    · No driving or operating machinery for 24 hours or while taking pain medication  · No alcoholic beverages for 24 hours or while taking pain medication  · DO NOT make any important personal or business decisions for 24 hours  Diet:  · Begin with liquids and light food and progress to your normal diet unless you are nauseated or otherwise instructed by your physician  Medication:  · Begin taking pain medication as directed and remember that narcotic medications may be constipating  Consider starting over the counter stool softeners  If constipation persists begin taking over the counter laxative  Dressing:  · Keep dressing dry   Do not remove dressing until seen by Dr Fernandez Meals:  · Apply ice to affected area 20 minutes on and 20 minutes off unless otherwise         Instructed     Elevation:  · Keep affected foot elevated on pillows    Weight Bearing status:  ___________________________  Wear blue surgical shoe when walking  Use crutches, cane or walker as directed    Keep regular scheduled appointment with Dr Batsheva Sanders    Call Dr Batsheva Sanders with any problems or questions at [phone]

## 2018-05-04 NOTE — OP NOTE
OPERATIVE REPORT  PATIENT NAME: Sherif Mejia    :  1965  MRN: [id number]  Pt Location: [facility] ROOM 01    SURGERY DATE: 2018    Surgeon(s) and Role:     Parth Lora DPM - Primary    Preop Diagnosis:  Plantar fascial fibromatosis [M72 2]    Post-Op Diagnosis Codes:     * Plantar fascial fibromatosis [M72 2]    Procedure(s) (LRB):  RELEASE FASCIA PLANTAR/FASCIOTOMY (Left)  Heel spur resection with partial plantar fasciectomy    Specimen(s):  * No specimens in log *    Estimated Blood Loss:   Minimal    Drains:       Anesthesia Type:   Local    Operative Indications:  Plantar fascial fibromatosis [M72 2]  Patient has chronic pain of the left foot  Patient has been diagnosed with heel spur and plantar fasciitis of the left foot  Patient has failed to respond to conservative measures that included injection therapy physical therapy, immobilization injection therapy  Anti-inflammatory medication  As well as orthotic foot control  She recently underwent right foot surgery for same diagnosis  This was successful and she wanted relief of pain on the left side  She therefore consented to surgery of the left foot understanding all the possible risks benefits alternatives and complications understand no guarantees have been given  Patient also understands and following could occur but not limited to pain scar swelling hypo or hyper seizure  RSD phlebitis vasculitis  Need for wound care infection for infection care recurrence of problem chronic pain calcaneal fracture need for future surgery to name a few  Operative Findings:  Intraoperatively was noted that the plantar fascia medial band was thickened as it inserted into the plantar aspect of the calcaneus  There was plantar calcaneal spurring palpated  No evidence of infection or abscess      Complications:   None    Procedure and Technique:  Patient was brought to the OR and placed on the operating table in supine position and prepped and draped in usual sterile manner  After checking for adequate anesthesia and with the use of ankle tourniquet hemostasis, attention was directed to the medial aspect of the left heel  A 15 blade scalpel used to perform a 3 cm linear incision lying on the juncture of the thick and thin skin  At this point incision was deepened down through subcutaneous layers with all bleeders being bovied and coagulated as necessary  Dissection was carried deep at this time  No abnormal mass noted at this time  Through sharp and blunt dissection the medial band of the plantar fascia was noted as it inserted into the plantar fascia  It was thickened fibrotic  It was transected  This gave exposure to the plantar calcaneal spur  The spur was removed via rongeur and bone rasp  After adequate reduction deformity and occurred was felt that closure could be obtained  After copiously flushing large amounts of sterile saline deep structures reapproximated with 3 0 chromic and the skin with formalin  At this point tourniquet was released and vascular status returned within normal proper limits  Dry sterile dressing applied  Posterior splint applied  Patient is to remain nonweightbearing  She will be followed up in the office a week take the prescribed medications     I was present for the entire procedure    Patient Disposition:  PACU     SIGNATURE: Lupe Mcclain DPM  DATE: May 4, 2018  TIME: 2:02 PM none

## 2018-05-04 NOTE — DISCHARGE INSTRUCTIONS
PODIATRY DISCHARGE INSTRUCTIONS  DR Joey [address] Instructions    · No driving or operating machinery for 24 hours or while taking pain medication  · No alcoholic beverages for 24 hours or while taking pain medication  · DO NOT make any important personal or business decisions for 24 hours  Diet:  · Begin with liquids and light food and progress to your normal diet unless you are nauseated or otherwise instructed by your physician  Medication:  · Begin taking pain medication as directed and remember that narcotic medications may be constipating  Consider starting over the counter stool softeners  If constipation persists begin taking over the counter laxative  Dressing:  · Keep dressing dry   Do not remove dressing until seen by Dr Yannick Mauro:  · Apply ice to affected area 20 minutes on and 20 minutes off unless otherwise         Instructed     Elevation:  · Keep affected foot elevated on pillows    Weight Bearing status:  ___________________________  Wear blue surgical shoe when walking  Use crutches, cane or walker as directed    Keep regular scheduled appointment with Dr Jerica Zendejas    Call Dr Jerica Zendejas with any problems or questions at [phone]

## 2018-05-04 NOTE — INTERIM OP NOTE
RELEASE FASCIA PLANTAR/FASCIOTOMY, Heel spur resection with partial plantar fasciectomy  Postoperative Note  PATIENT NAME: Bell Weinstein  : 1965  MRN: [id number]  [facility] ROOM 01    Surgery Date: 2018    Preop Diagnosis:  Plantar fascial fibromatosis [M72 2]    Post-Op Diagnosis Codes:     * Plantar fascial fibromatosis [M72 2]    Procedure(s) (LRB):  RELEASE FASCIA PLANTAR/FASCIOTOMY (Left)  Heel spur resection with partial plantar fasciectomy    Surgeon(s) and Role:     Daren Llanos DPM - Primary    Specimens:  * No specimens in log *    Estimated Blood Loss:   Minimal    Anesthesia Type:   Local     Findings: Thickened plantar fascia  Medial band of plantar fascia his fibrotic  Plantar heel spur noted of medial tuberosity    Complications:   None    SIGNATURE: Luigi Shepard DPM   DATE: May 4, 2018   TIME: 2:01 PM

## 2018-05-08 ENCOUNTER — TELEPHONE (OUTPATIENT)
Dept: ENDOCRINOLOGY | Facility: CLINIC | Age: 53
End: 2018-05-08

## 2018-05-08 ENCOUNTER — OFFICE VISIT (OUTPATIENT)
Dept: PODIATRY | Facility: CLINIC | Age: 53
End: 2018-05-08

## 2018-05-08 VITALS
HEIGHT: 62 IN | SYSTOLIC BLOOD PRESSURE: 131 MMHG | RESPIRATION RATE: 17 BRPM | HEART RATE: 80 BPM | BODY MASS INDEX: 36.44 KG/M2 | WEIGHT: 198 LBS | DIASTOLIC BLOOD PRESSURE: 91 MMHG

## 2018-05-08 DIAGNOSIS — M21.962 ACQUIRED DEFORMITY OF LEFT ANKLE AND FOOT: Primary | ICD-10-CM

## 2018-05-08 PROCEDURE — 99024 POSTOP FOLLOW-UP VISIT: CPT | Performed by: PODIATRIST

## 2018-05-08 RX ORDER — HYDROXYZINE HYDROCHLORIDE 25 MG/1
25 TABLET, FILM COATED ORAL 3 TIMES DAILY
Qty: 9 TABLET | Refills: 0 | Status: SHIPPED | OUTPATIENT
Start: 2018-05-08 | End: 2019-04-01 | Stop reason: ALTCHOICE

## 2018-05-08 NOTE — TELEPHONE ENCOUNTER
Left message for pt that the Rick Lawson is not covered under her insurance and we tried to get a prior authorization but they refused it  She can call her insurance and see how much they will charge her out of pocket or she can try the weight loss program here  If she has any further questions to call back

## 2018-05-08 NOTE — PROGRESS NOTES
Procedures   Foot Exam     Patient is status post left foot surgery  She is nonweightbearing with crutches  She has no history of fever or night sweats  She has no pain  She is also very happy with surgical result of right foot  She is ambulating without pain in her right foot as well     0  No change in neurovascular status  Left foot demonstrates with dry posterior splint  Splint removed  Incision coapted  No evidence of infection  Plan  Refill of hydroxyzine  Change of dry sterile dressing  Patient remain nonweightbearing    She return for x-ray and suture removal

## 2018-05-08 NOTE — TELEPHONE ENCOUNTER
Patient checked with insurance and [address] will be to expensive, can you Prescribe Qysmia instead per patient

## 2018-05-08 NOTE — TELEPHONE ENCOUNTER
If Guillaume Ledesma is not covered they will probably not cover any other weight loss medications   She can see dietician or go to weight loss management

## 2018-05-09 ENCOUNTER — TELEPHONE (OUTPATIENT)
Dept: FAMILY MEDICINE CLINIC | Facility: CLINIC | Age: 53
End: 2018-05-09

## 2018-05-09 DIAGNOSIS — E66.9 OBESITY (BMI 35.0-39.9 WITHOUT COMORBIDITY): Primary | ICD-10-CM

## 2018-05-09 RX ORDER — PHENTERMINE HYDROCHLORIDE 37.5 MG/1
37.5 TABLET ORAL DAILY
Qty: 30 TABLET | Refills: 1 | Status: SHIPPED | OUTPATIENT
Start: 2018-05-09 | End: 2018-10-22 | Stop reason: ALTCHOICE

## 2018-05-09 NOTE — TELEPHONE ENCOUNTER
5/9/2018 3:46 PM we discussed options and will try phentermine  Risks and benefits of medication discussed       Message complete  Robinson Branch, DO

## 2018-05-09 NOTE — TELEPHONE ENCOUNTER
Darya is calling to see if Dr Jeanie Blood would call in a presription for diet pills  She said they have discussed this at several visits  She did not want to make an apt because she has had blood work done too      She gave two names of diet pills  qsymia and topiramate [place]

## 2018-05-14 ENCOUNTER — OFFICE VISIT (OUTPATIENT)
Dept: PODIATRY | Facility: CLINIC | Age: 53
End: 2018-05-14
Payer: COMMERCIAL

## 2018-05-14 VITALS
WEIGHT: 198 LBS | RESPIRATION RATE: 17 BRPM | SYSTOLIC BLOOD PRESSURE: 138 MMHG | DIASTOLIC BLOOD PRESSURE: 78 MMHG | BODY MASS INDEX: 36.44 KG/M2 | HEART RATE: 76 BPM | HEIGHT: 62 IN

## 2018-05-14 DIAGNOSIS — M21.962 ACQUIRED DEFORMITY OF LEFT ANKLE AND FOOT: Primary | ICD-10-CM

## 2018-05-14 PROCEDURE — 73620 X-RAY EXAM OF FOOT: CPT | Performed by: PODIATRIST

## 2018-05-14 PROCEDURE — 99024 POSTOP FOLLOW-UP VISIT: CPT | Performed by: PODIATRIST

## 2018-05-14 NOTE — PROGRESS NOTES
Procedures   Foot Exam     Patient has no pain in either foot  She is status post bilateral heel spur surgery  She is doing well  She is ambulating  She is nonweightbearing left-sided  She has no history of fever or night sweats  She is very happy with surgical result  0  No change in neurovascular status  Left foot is in splint  Cast removed  Incision coapted  Neurovascular status intact  X-ray  No evidence of fracture  Right foot is stable  Plan  Removed posterior splint  A apply Aircast the left lower extremity  X-rays taken  Patient will use crutches to ambulate  She will remain on medication protocol

## 2018-05-23 ENCOUNTER — OFFICE VISIT (OUTPATIENT)
Dept: PODIATRY | Facility: CLINIC | Age: 53
End: 2018-05-23
Payer: COMMERCIAL

## 2018-05-23 VITALS
DIASTOLIC BLOOD PRESSURE: 85 MMHG | HEART RATE: 78 BPM | HEIGHT: 62 IN | BODY MASS INDEX: 36.44 KG/M2 | WEIGHT: 198 LBS | SYSTOLIC BLOOD PRESSURE: 130 MMHG | RESPIRATION RATE: 16 BRPM

## 2018-05-23 DIAGNOSIS — L03.032 CELLULITIS AND ABSCESS OF TOE OF LEFT FOOT: Primary | ICD-10-CM

## 2018-05-23 DIAGNOSIS — L02.612 CELLULITIS AND ABSCESS OF TOE OF LEFT FOOT: Primary | ICD-10-CM

## 2018-05-23 PROCEDURE — 99212 OFFICE O/P EST SF 10 MIN: CPT | Performed by: PODIATRIST

## 2018-05-23 PROCEDURE — 73620 X-RAY EXAM OF FOOT: CPT | Performed by: PODIATRIST

## 2018-05-23 RX ORDER — LEVOFLOXACIN 500 MG/1
500 TABLET, FILM COATED ORAL EVERY 24 HOURS
Qty: 7 TABLET | Refills: 0 | Status: SHIPPED | OUTPATIENT
Start: 2018-05-23 | End: 2018-05-30

## 2018-05-23 RX ORDER — OXYCODONE AND ACETAMINOPHEN 10; 325 MG/1; MG/1
TABLET ORAL
Refills: 0 | COMMUNITY
Start: 2018-05-01 | End: 2018-10-22 | Stop reason: ALTCHOICE

## 2018-05-23 NOTE — PROGRESS NOTES
Procedures   Foot Exam     Patient presents for left foot check  Patient had been doing well  However over the last day or 2 she has had increasing pain  She has been getting the foot wet  She has not been covering the incision  0  Neurovascular status without change  Left foot demonstrates mild dehiscence of incision of heel  No occult pus  There may be slight incisional cellulitis  Culture sensitivity will demonstrate this  Plan  Culture and sensitivity done  Gentian violet applied  Patient will bandage with Betadine wet to daily once daily  We will add Levaquin  X-rays have been taken  Patient remain in surgical boot

## 2018-05-25 ENCOUNTER — OFFICE VISIT (OUTPATIENT)
Dept: PODIATRY | Facility: CLINIC | Age: 53
End: 2018-05-25
Payer: COMMERCIAL

## 2018-05-25 VITALS
BODY MASS INDEX: 36.44 KG/M2 | WEIGHT: 198 LBS | SYSTOLIC BLOOD PRESSURE: 140 MMHG | RESPIRATION RATE: 17 BRPM | HEART RATE: 78 BPM | HEIGHT: 62 IN | DIASTOLIC BLOOD PRESSURE: 91 MMHG

## 2018-05-25 DIAGNOSIS — L03.032 CELLULITIS AND ABSCESS OF TOE OF LEFT FOOT: Primary | ICD-10-CM

## 2018-05-25 DIAGNOSIS — L02.612 CELLULITIS AND ABSCESS OF TOE OF LEFT FOOT: Primary | ICD-10-CM

## 2018-05-25 DIAGNOSIS — S91.302A OPEN WOUND OF LEFT FOOT, INITIAL ENCOUNTER: ICD-10-CM

## 2018-05-25 PROCEDURE — 99212 OFFICE O/P EST SF 10 MIN: CPT | Performed by: PODIATRIST

## 2018-05-25 NOTE — PROGRESS NOTES
Procedures   Foot Exam     Urgent visit  Patient is concerned with her left foot  She has slight increasing pain within the foot  She is taking antibiotic as directed  Patient is on have fever or night sweats  0   Neurovascular status  No change  Left foot demonstrates macerated incision  Negative occult cellulitis  Culture taken of serous exudate  Plan  Continue antibiotic as directed  Culture and sensitivity done  Patient has been dressed today with Betadine wet to dry  That will stay on for 3 days

## 2018-05-26 ENCOUNTER — HOSPITAL ENCOUNTER (EMERGENCY)
Facility: HOSPITAL | Age: 53
Discharge: HOME | End: 2018-05-26
Attending: EMERGENCY MEDICINE | Admitting: EMERGENCY MEDICINE
Payer: COMMERCIAL

## 2018-05-26 VITALS
SYSTOLIC BLOOD PRESSURE: 143 MMHG | RESPIRATION RATE: 14 BRPM | DIASTOLIC BLOOD PRESSURE: 96 MMHG | OXYGEN SATURATION: 95 % | HEIGHT: 62 IN | WEIGHT: 192 LBS | BODY MASS INDEX: 35.33 KG/M2 | HEART RATE: 88 BPM | TEMPERATURE: 98.1 F

## 2018-05-26 DIAGNOSIS — M79.672 FOOT PAIN, LEFT: Primary | ICD-10-CM

## 2018-05-26 PROCEDURE — 99283 EMERGENCY DEPT VISIT LOW MDM: CPT

## 2018-05-26 NOTE — ED PROVIDER NOTES
History  Chief Complaint   Patient presents with    Foot Pain     pt had heel spur sx 5/8, c/o pain     46 female with c/o spur removal on her left heel  Pt has had surgery on both heels  Pt states she has pain on this one which was worse than the last one  No other complaints   Pt is awake and alert no other complaints        History provided by:  Patient   used: No        Prior to Admission Medications   Prescriptions Last Dose Informant Patient Reported? Taking?    DULoxetine (CYMBALTA) 30 mg delayed release capsule  Self No No   Sig: Take 1 capsule (30 mg total) by mouth daily for 30 days   Liraglutide -Weight Management 18 MG/3ML SOPN   No No   Sig: Inject 0 1 mL (0 6 mg total) under the skin once a week First week inject 0 6 mg, second week 1 2 mg, third week 1 8 mg, fourth week 2 4 mg, fifth week 3 0 mg, then continue on this dose   amLODIPine (NORVASC) 10 mg tablet  Self No No   Sig: Take 1 tablet (10 mg total) by mouth daily   docusate sodium (RA COL-RITE) 100 mg capsule  Self Yes No   Sig: Take 100 mg by mouth 2 (two) times a day   etodolac (LODINE) 400 MG tablet  Self No No   Sig: Take 1 tablet (400 mg total) by mouth 2 (two) times a day for 30 days   hydrOXYzine HCL (ATARAX) 25 mg tablet   No No   Sig: Take 1 tablet (25 mg total) by mouth 3 (three) times a day for 3 days   levofloxacin (LEVAQUIN) 500 mg tablet   No No   Sig: Take 1 tablet (500 mg total) by mouth every 24 hours for 7 days   oxyCODONE-acetaminophen (PERCOCET)  mg per tablet   Yes No   phentermine (ADIPEX-P) 37 5 MG tablet   No No   Sig: Take 1 tablet (37 5 mg total) by mouth daily   pregabalin (LYRICA) 75 mg capsule  Self Yes No   Sig: Take 50 mg by mouth 3 (three) times a day   traMADol (ULTRAM) 50 mg tablet  Self Yes No   Sig: every 6 (six) hours as needed        Facility-Administered Medications: None       Past Medical History:   Diagnosis Date    Apnea 01/22/2009    Arthralgia     last assessed 6/28/13  Depression 10/09/2006    Dysmenorrhea 01/22/2009    Essential hypertension 3/8/2018    Exposure to potentially hazardous body fluids     last assessed 6/1/16    Insomnia 10/09/2006    Kidney stone     20 years ago    Muscle weakness (generalized) 08/17/2006    Obesity     Viral gastroenteritis     last assessed 6/13/13       Past Surgical History:   Procedure Laterality Date    APPENDECTOMY      last assessed 5/1/17    PLANTAR FASCIA SURGERY Right 3/30/2018    Procedure: Resection heel spur;  Surgeon: Susanna Arizmendi DPM;  Location: [address] [place] [address];  Service: Podiatry    PLANTAR FASCIA SURGERY Left 5/4/2018    Procedure: RELEASE FASCIA PLANTAR/FASCIOTOMY;  Surgeon: Susanna Arizmendi DPM;  Location: [address] [place] [address];  Service: Podiatry    [place] REMOVAL OF HEEL SPUR  5/4/2018    Procedure: Heel spur resection with partial plantar fasciectomy;  Surgeon: Susanna Arizmendi DPM;  Location: [facility];  Service: Podiatry    SALPINGECTOMY      had a tubal pregnancy    SALPINGOSTOMY      TONSILLECTOMY         Family History   Problem Relation Age of Onset    Diabetes Mother     Fibromyalgia Mother     Hypertension Father     Hyperthyroidism Father     Stroke Maternal Grandfather      I have reviewed and agree with the history as documented  Social History   Substance Use Topics    Smoking status: Former Smoker     Years: 30 00     Quit date: 3/8/2014    Smokeless tobacco: Never Used    Alcohol use No        Review of Systems   Constitutional: Negative for activity change, chills, diaphoresis and fever  HENT: Negative for congestion, ear pain, nosebleeds, sore throat, trouble swallowing and voice change  Eyes: Negative for pain, discharge and redness  Respiratory: Negative for apnea, cough, choking, shortness of breath, wheezing and stridor  Cardiovascular: Negative for chest pain and palpitations  Gastrointestinal: Negative for abdominal distention, abdominal pain, constipation, diarrhea, nausea and vomiting  Endocrine: Negative for polydipsia  Genitourinary: Negative for difficulty urinating, dysuria, flank pain, frequency, hematuria and urgency  Musculoskeletal: Negative for back pain, gait problem, joint swelling, myalgias, neck pain and neck stiffness  Skin: Negative for pallor and rash  Neurological: Negative for dizziness, tremors, syncope, speech difficulty, weakness, numbness and headaches  Hematological: Negative for adenopathy  Psychiatric/Behavioral: Negative for confusion, hallucinations, self-injury and suicidal ideas  The patient is not nervous/anxious  Physical Exam  Physical Exam   Constitutional: She is oriented to person, place, and time  Vital signs are normal  She appears well-developed and well-nourished  HENT:   Head: Normocephalic and atraumatic  Right Ear: External ear normal    Left Ear: External ear normal    Nose: Nose normal    Mouth/Throat: Oropharynx is clear and moist    Eyes: Conjunctivae and EOM are normal  Pupils are equal, round, and reactive to light  Cardiovascular: Normal rate  Pulmonary/Chest: Effort normal    Abdominal: Soft  Musculoskeletal: Normal range of motion  She exhibits tenderness  Neurological: She is alert and oriented to person, place, and time  Skin: Skin is warm  Psychiatric: She has a normal mood and affect  Nursing note and vitals reviewed        Vital Signs  ED Triage Vitals [05/26/18 1747]   Temperature Pulse Respirations Blood Pressure SpO2   98 1 °F (36 7 °C) 88 14 143/96 95 %      Temp Source Heart Rate Source Patient Position - Orthostatic VS BP Location FiO2 (%)   Oral Monitor Sitting Right arm --      Pain Score       8           Vitals:    05/26/18 1747   BP: 143/96   Pulse: 88   Patient Position - Orthostatic VS: Sitting       Visual Acuity      ED Medications  Medications - No data to display    Diagnostic Studies  Results Reviewed     None                 No orders to display              Procedures  Procedures Phone Contacts  ED Phone Contact    ED Course                               MDM  CritCare Time    Disposition  Final diagnoses: Foot pain, left     Time reflects when diagnosis was documented in both MDM as applicable and the Disposition within this note     Time User Action Codes Description Comment    5/26/2018  5:53 PM Justina Fisher Add [J33 403] Foot pain, left       ED Disposition     ED Disposition Condition Comment    Discharge  Darya Morin discharge to home/self care      Condition at discharge: Stable        Follow-up Information     Follow up With Specialties Details Why [address], DO Family Medicine Schedule an appointment as soon as possible for a visit  [address]  [phone]            Discharge Medication List as of 5/26/2018  5:57 PM      CONTINUE these medications which have NOT CHANGED    Details   amLODIPine (NORVASC) 10 mg tablet Take 1 tablet (10 mg total) by mouth daily, Starting Mon 4/9/2018, Normal      docusate sodium (RA COL-RITE) 100 mg capsule Take 100 mg by mouth 2 (two) times a day, Historical Med      DULoxetine (CYMBALTA) 30 mg delayed release capsule Take 1 capsule (30 mg total) by mouth daily for 30 days, Starting Mon 4/9/2018, Until Wed 5/9/2018, Normal      etodolac (LODINE) 400 MG tablet Take 1 tablet (400 mg total) by mouth 2 (two) times a day for 30 days, Starting Tue 4/3/2018, Until Thu 5/3/2018, Normal      hydrOXYzine HCL (ATARAX) 25 mg tablet Take 1 tablet (25 mg total) by mouth 3 (three) times a day for 3 days, Starting Tue 5/8/2018, Until Fri 5/11/2018, Normal      levofloxacin (LEVAQUIN) 500 mg tablet Take 1 tablet (500 mg total) by mouth every 24 hours for 7 days, Starting Wed 5/23/2018, Until Wed 5/30/2018, Normal      Liraglutide -Weight Management 18 MG/3ML SOPN Inject 0 1 mL (0 6 mg total) under the skin once a week First week inject 0 6 mg, second week 1 2 mg, third week 1 8 mg, fourth week 2 4 mg, fifth week 3 0 mg, then continue on this dose, Starting Thu 4/26/2018, Print      oxyCODONE-acetaminophen (PERCOCET)  mg per tablet Starting Tue 5/1/2018, Historical Med      phentermine (ADIPEX-P) 37 5 MG tablet Take 1 tablet (37 5 mg total) by mouth daily, Starting Wed 5/9/2018, Normal      pregabalin (LYRICA) 75 mg capsule Take 50 mg by mouth 3 (three) times a day, Historical Med      traMADol (ULTRAM) 50 mg tablet every 6 (six) hours as needed  , Starting Sun 4/1/2018, Historical Med           No discharge procedures on file      ED Provider  Electronically Signed by           Helena Reis DO  05/27/18 0710

## 2018-05-26 NOTE — DISCHARGE INSTRUCTIONS
Arthralgia   WHAT YOU NEED TO KNOW:   Arthralgia is pain in one or more joints, with no inflammation  It may be short-term and get better within 6 to 8 weeks  Arthralgia can be an early sign of arthritis  Arthralgia may be caused by a medical condition, such as a hormone disorder or a tumor  It may also be caused by an infection or injury  DISCHARGE INSTRUCTIONS:   Medicines: The following medicines may  be ordered for you:  · Acetaminophen  decreases pain  Ask how much to take and how often to take it  Follow directions  Acetaminophen can cause liver damage if not taken correctly  · NSAIDs  decrease pain and prevent swelling  Ask your healthcare provider which medicine is right for you  Ask how much to take and when to take it  Take as directed  NSAIDs can cause stomach bleeding and kidney problems if not taken correctly  · Pain relief cream  decreases pain  Use this cream as directed  · Take your medicine as directed  Contact your healthcare provider if you think your medicine is not helping or if you have side effects  Tell him of her if you are allergic to any medicine  Keep a list of the medicines, vitamins, and herbs you take  Include the amounts, and when and why you take them  Bring the list or the pill bottles to follow-up visits  Carry your medicine list with you in case of an emergency  Follow up with your healthcare provider or specialist as directed:  Write down your questions so you remember to ask them during your visits  Self-care:   · Apply heat  to help decrease pain  Use a heating pad or heat wrap  Apply heat for 20 to 30 minutes every 2 hours for as many days as directed  · Rest  as much as possible  Avoid activities that cause joint pain  · Apply ice  to help decrease swelling and pain  Ice may also help prevent tissue damage  Use an ice pack, or put crushed ice in a plastic bag   Cover it with a towel and place it on your painful joint for 15 to 20 minutes every hour or as directed  · Support  the joint with a brace or elastic wrap as directed  · Elevate  your joint above the level of your heart as often as you can to help decrease swelling and pain  Prop your painful joint on pillows or blankets to keep it elevated comfortably  · Lose weight  if you are overweight  Extra weight can put pressure on your joints and cause more pain  Ask your healthcare provider how much you should weigh  Ask him to help you create a weight loss plan  · Exercise  regularly to help improve joint movement and to decrease pain  Ask about the best exercise plan for you  Low-impact exercises can help take the pressure off your joints  Examples are walking, swimming, and water aerobics  Physical therapy:  A physical therapist teaches you exercises to help improve movement and strength, and to decrease pain  Ask your healthcare provider if physical therapy is right for you  Contact your healthcare provider or specialist if:   · You have a fever  · You continue to have joint pain that cannot be relieved with heat, ice, or medicine  · You have pain and inflammation around your joint  · You have questions or concerns about your condition or care  Return to the emergency department if:   · You have sudden, severe pain when you move your joint  · You have a fever and shaking chills  · You cannot move your joint  · You lose feeling on the side of your body where you have the painful joint  © 2017 [employer]  Information is for End User's use only and may not be sold, redistributed or otherwise used for commercial purposes  All illustrations and images included in CareNotes® are the copyrighted property of [employer]  or [employer]  The above information is an  only  It is not intended as medical advice for individual conditions or treatments   Talk to your doctor, nurse or pharmacist before following any medical regimen to see if it is safe and effective for you

## 2018-05-29 ENCOUNTER — VBI (OUTPATIENT)
Dept: ADMINISTRATIVE | Facility: OTHER | Age: 53
End: 2018-05-29

## 2018-05-29 LAB
BACTERIA SPEC AEROBE CULT: ABNORMAL
BACTERIA SPEC AEROBE CULT: ABNORMAL
Lab: ABNORMAL
SL AMB ANTIMICROBIAL SUSCEPTIBILITY: ABNORMAL
SL AMB ANTIMICROBIAL SUSCEPTIBILITY: ABNORMAL

## 2018-05-31 ENCOUNTER — OFFICE VISIT (OUTPATIENT)
Dept: PODIATRY | Facility: CLINIC | Age: 53
End: 2018-05-31

## 2018-05-31 VITALS
RESPIRATION RATE: 17 BRPM | DIASTOLIC BLOOD PRESSURE: 86 MMHG | HEART RATE: 78 BPM | WEIGHT: 192 LBS | HEIGHT: 62 IN | BODY MASS INDEX: 35.33 KG/M2 | SYSTOLIC BLOOD PRESSURE: 134 MMHG

## 2018-05-31 DIAGNOSIS — S91.302D OPEN WOUND OF LEFT FOOT, SUBSEQUENT ENCOUNTER: Primary | ICD-10-CM

## 2018-05-31 PROCEDURE — 99024 POSTOP FOLLOW-UP VISIT: CPT | Performed by: PODIATRIST

## 2018-05-31 RX ORDER — ETODOLAC 400 MG/1
TABLET, FILM COATED ORAL
Refills: 0 | COMMUNITY
Start: 2018-05-23 | End: 2018-10-22 | Stop reason: ALTCHOICE

## 2018-05-31 NOTE — PROGRESS NOTES
Procedures   Foot Exam     Patient is feeling significantly better  She took antibiotic as prescribed  She is using Aircast   She has no history of fever or night sweats  Her right foot is fine  0  No change in neurovascular status  Left foot demonstrates coapted incision  No evidence of cellulitis  His only small area of dehiscence  There is superficial ulcer of the area  This is healing without abscess either  Plan  Sutures removed  Patient will bandage daily with Betadine wet to dry  She will stay in Aircast for 1 week  She will finish all medications    She will return for follow-up and x-ray

## 2018-06-07 ENCOUNTER — TELEPHONE (OUTPATIENT)
Dept: ENDOCRINOLOGY | Facility: CLINIC | Age: 53
End: 2018-06-07

## 2018-06-07 ENCOUNTER — OFFICE VISIT (OUTPATIENT)
Dept: PODIATRY | Facility: CLINIC | Age: 53
End: 2018-06-07
Payer: COMMERCIAL

## 2018-06-07 VITALS
WEIGHT: 192 LBS | HEART RATE: 73 BPM | HEIGHT: 62 IN | RESPIRATION RATE: 17 BRPM | BODY MASS INDEX: 35.33 KG/M2 | DIASTOLIC BLOOD PRESSURE: 81 MMHG | SYSTOLIC BLOOD PRESSURE: 129 MMHG

## 2018-06-07 DIAGNOSIS — S91.302D OPEN WOUND OF LEFT FOOT, SUBSEQUENT ENCOUNTER: Primary | ICD-10-CM

## 2018-06-07 DIAGNOSIS — M79.672 LEFT FOOT PAIN: ICD-10-CM

## 2018-06-07 PROCEDURE — 99211 OFF/OP EST MAY X REQ PHY/QHP: CPT | Performed by: PODIATRIST

## 2018-06-07 PROCEDURE — 73620 X-RAY EXAM OF FOOT: CPT | Performed by: PODIATRIST

## 2018-06-21 ENCOUNTER — OFFICE VISIT (OUTPATIENT)
Dept: PODIATRY | Facility: CLINIC | Age: 53
End: 2018-06-21
Payer: COMMERCIAL

## 2018-06-21 VITALS
SYSTOLIC BLOOD PRESSURE: 132 MMHG | HEIGHT: 62 IN | HEART RATE: 78 BPM | BODY MASS INDEX: 35.33 KG/M2 | WEIGHT: 192 LBS | RESPIRATION RATE: 16 BRPM | DIASTOLIC BLOOD PRESSURE: 80 MMHG

## 2018-06-21 DIAGNOSIS — S91.302D OPEN WOUND OF LEFT FOOT, SUBSEQUENT ENCOUNTER: Primary | ICD-10-CM

## 2018-06-21 PROCEDURE — 99212 OFFICE O/P EST SF 10 MIN: CPT | Performed by: PODIATRIST

## 2018-06-21 NOTE — PROGRESS NOTES
Assessment/Plan:  Superficial wound left foot  Normal course  Plan  Patient will continue to stretch daily  She will use orthotics  She declines physical therapy  We will maintain anti-inflammatory medicine  She return in 3 weeks for x-ray  She is released to work  There are no diagnoses linked to this encounter  Subjective:   Patient ID: Carl Corbin is a 48 y o  female      Past Medical History:   Diagnosis Date    Apnea 01/22/2009    Arthralgia     last assessed 6/28/13    Depression 10/09/2006    Dysmenorrhea 01/22/2009    Essential hypertension 3/8/2018    Exposure to potentially hazardous body fluids     last assessed 6/1/16    Insomnia 10/09/2006    Kidney stone     20 years ago    Muscle weakness (generalized) 08/17/2006    Obesity     Viral gastroenteritis     last assessed 6/13/13       Past Surgical History:   Procedure Laterality Date    APPENDECTOMY      last assessed 5/1/17    PLANTAR FASCIA SURGERY Right 3/30/2018    Procedure: Resection heel spur;  Surgeon: Ismael Bell DPM;  Location: [address] [place] [address];  Service: Podiatry    PLANTAR FASCIA SURGERY Left 5/4/2018    Procedure: RELEASE FASCIA PLANTAR/FASCIOTOMY;  Surgeon: Ismael Bell DPM;  Location: [address] [place] [address];  Service: Podiatry    [place] REMOVAL OF HEEL SPUR  5/4/2018    Procedure: Heel spur resection with partial plantar fasciectomy;  Surgeon: Ismael Bell DPM;  Location: [facility];  Service: Podiatry    SALPINGECTOMY      had a tubal pregnancy    SALPINGOSTOMY      TONSILLECTOMY         No Known Allergies      Current Outpatient Prescriptions:     amLODIPine (NORVASC) 10 mg tablet, Take 1 tablet (10 mg total) by mouth daily, Disp: 90 tablet, Rfl: 1    docusate sodium (RA COL-RITE) 100 mg capsule, Take 100 mg by mouth 2 (two) times a day, Disp: , Rfl:     DULoxetine (CYMBALTA) 30 mg delayed release capsule, Take 1 capsule (30 mg total) by mouth daily for 30 days, Disp: 90 capsule, Rfl: 1    etodolac (LODINE) 400 MG tablet, Take 1 tablet (400 mg total) by mouth 2 (two) times a day for 30 days, Disp: 60 tablet, Rfl: 0    etodolac (LODINE) 400 MG tablet, , Disp: , Rfl: 0    hydrOXYzine HCL (ATARAX) 25 mg tablet, Take 1 tablet (25 mg total) by mouth 3 (three) times a day for 3 days, Disp: 9 tablet, Rfl: 0    Liraglutide -Weight Management 18 MG/3ML SOPN, Inject 0 1 mL (0 6 mg total) under the skin once a week First week inject 0 6 mg, second week 1 2 mg, third week 1 8 mg, fourth week 2 4 mg, fifth week 3 0 mg, then continue on this dose, Disp: 0 4 mL, Rfl: 0    LYRICA 100 MG capsule, , Disp: , Rfl: 0    oxyCODONE-acetaminophen (PERCOCET)  mg per tablet, , Disp: , Rfl: 0    phentermine (ADIPEX-P) 37 5 MG tablet, Take 1 tablet (37 5 mg total) by mouth daily, Disp: 30 tablet, Rfl: 1    pregabalin (LYRICA) 75 mg capsule, Take 50 mg by mouth 3 (three) times a day, Disp: , Rfl:     traMADol (ULTRAM) 50 mg tablet, every 6 (six) hours as needed  , Disp: , Rfl: 0    Patient Active Problem List   Diagnosis    Acquired deformity of right foot    Closed nondisplaced fracture of body of right calcaneus    Plantar fasciitis    Right foot pain    Left foot pain    Acquired ankle/foot deformity    Reduced libido    Elevated testosterone level in female    Hirsutism    Hypercholesterolemia    Impaired fasting glucose    Nicotine dependence    Pes planus, congenital    Plantar fibromatosis    Plantar fasciitis of right foot    Heel spur, right    Shortness of breath    LBBB (left bundle branch block)    Essential hypertension    Dyslipidemia    Obesity (BMI 35 0-39 9 without comorbidity)    Abnormal weight gain    Sleep disturbance    Congenital pes planus    Difficulty walking    Plantar fascial fibromatosis    Bursitis of left foot    Heel spur, left    Generalized anxiety disorder    Radiculopathy of lumbar region    Acquired deformity of left foot    Cellulitis and abscess of toe of left foot    Open wound of left foot       HPI    The following portions of the patient's history were reviewed and updated as appropriate: allergies, current medications, past family history, past medical history, past social history, past surgical history and problem list     Review of Systems      Historical Information   Past Medical History:   Diagnosis Date    Apnea 01/22/2009    Arthralgia     last assessed 6/28/13    Depression 10/09/2006    Dysmenorrhea 01/22/2009    Essential hypertension 3/8/2018    Exposure to potentially hazardous body fluids     last assessed 6/1/16    Insomnia 10/09/2006    Kidney stone     20 years ago    Muscle weakness (generalized) 08/17/2006    Obesity     Viral gastroenteritis     last assessed 6/13/13     Past Surgical History:   Procedure Laterality Date    APPENDECTOMY      last assessed 5/1/17    PLANTAR FASCIA SURGERY Right 3/30/2018    Procedure: Resection heel spur;  Surgeon: Traci Candelario DPM;  Location: [address] [place] [address];  Service: Podiatry    PLANTAR FASCIA SURGERY Left 5/4/2018    Procedure: RELEASE FASCIA PLANTAR/FASCIOTOMY;  Surgeon: Traci Candelario DPM;  Location: [address] [place] [address];  Service: Podiatry    [place] REMOVAL OF HEEL SPUR  5/4/2018    Procedure: Heel spur resection with partial plantar fasciectomy;  Surgeon: Traci Candelario DPM;  Location[facility];  Service: Podiatry    SALPINGECTOMY      had a tubal pregnancy    SALPINGOSTOMY      TONSILLECTOMY       Social History   History   Alcohol Use No     History   Drug Use No     Family History:   Family History   Problem Relation Age of Onset    Diabetes Mother     Fibromyalgia Mother     Hypertension Father     Hyperthyroidism Father     Stroke Maternal Grandfather        Meds/Allergies   No Known Allergies    Current Outpatient Prescriptions on File Prior to Visit   Medication Sig Dispense Refill    amLODIPine (NORVASC) 10 mg tablet Take 1 tablet (10 mg total) by mouth daily 90 tablet 1    docusate sodium (RA COL-RITE) 100 mg capsule Take 100 mg by mouth 2 (two) times a day      DULoxetine (CYMBALTA) 30 mg delayed release capsule Take 1 capsule (30 mg total) by mouth daily for 30 days 90 capsule 1    etodolac (LODINE) 400 MG tablet Take 1 tablet (400 mg total) by mouth 2 (two) times a day for 30 days 60 tablet 0    etodolac (LODINE) 400 MG tablet   0    hydrOXYzine HCL (ATARAX) 25 mg tablet Take 1 tablet (25 mg total) by mouth 3 (three) times a day for 3 days 9 tablet 0    Liraglutide -Weight Management 18 MG/3ML SOPN Inject 0 1 mL (0 6 mg total) under the skin once a week First week inject 0 6 mg, second week 1 2 mg, third week 1 8 mg, fourth week 2 4 mg, fifth week 3 0 mg, then continue on this dose 0 4 mL 0    LYRICA 100 MG capsule   0    oxyCODONE-acetaminophen (PERCOCET)  mg per tablet   0    phentermine (ADIPEX-P) 37 5 MG tablet Take 1 tablet (37 5 mg total) by mouth daily 30 tablet 1    pregabalin (LYRICA) 75 mg capsule Take 50 mg by mouth 3 (three) times a day      traMADol (ULTRAM) 50 mg tablet every 6 (six) hours as needed    0     No current facility-administered medications on file prior to visit  Objective: Foot ExamPhysical Exam   Constitutional: She appears well-developed and well-nourished  Cardiovascular: Normal rate and regular rhythm  Musculoskeletal:   No pain with palpation bilateral plantar fascia insertion   Skin:   Small superficial wound medial aspect left heel  No evidence of cellulitis

## 2018-07-05 ENCOUNTER — TELEPHONE (OUTPATIENT)
Dept: FAMILY MEDICINE CLINIC | Facility: CLINIC | Age: 53
End: 2018-07-05

## 2018-07-05 NOTE — TELEPHONE ENCOUNTER
Patient needs to schedule PPD placement and read for next week      Please call patient at [phone]

## 2018-07-10 ENCOUNTER — CLINICAL SUPPORT (OUTPATIENT)
Dept: FAMILY MEDICINE CLINIC | Facility: CLINIC | Age: 53
End: 2018-07-10
Payer: COMMERCIAL

## 2018-07-10 DIAGNOSIS — Z11.1 ENCOUNTER FOR PPD TEST: Primary | ICD-10-CM

## 2018-07-10 PROCEDURE — 86580 TB INTRADERMAL TEST: CPT

## 2018-10-10 ENCOUNTER — OFFICE VISIT (OUTPATIENT)
Dept: PODIATRY | Facility: CLINIC | Age: 53
End: 2018-10-10
Payer: COMMERCIAL

## 2018-10-10 VITALS
RESPIRATION RATE: 17 BRPM | SYSTOLIC BLOOD PRESSURE: 107 MMHG | DIASTOLIC BLOOD PRESSURE: 65 MMHG | BODY MASS INDEX: 35.33 KG/M2 | HEART RATE: 77 BPM | WEIGHT: 192 LBS | HEIGHT: 62 IN

## 2018-10-10 DIAGNOSIS — M77.42 METATARSALGIA OF BOTH FEET: Primary | ICD-10-CM

## 2018-10-10 DIAGNOSIS — M79.672 PAIN IN BOTH FEET: ICD-10-CM

## 2018-10-10 DIAGNOSIS — M77.41 METATARSALGIA OF BOTH FEET: Primary | ICD-10-CM

## 2018-10-10 DIAGNOSIS — M79.671 PAIN IN BOTH FEET: ICD-10-CM

## 2018-10-10 DIAGNOSIS — M54.16 RADICULOPATHY OF LUMBAR REGION: ICD-10-CM

## 2018-10-10 PROCEDURE — 99213 OFFICE O/P EST LOW 20 MIN: CPT | Performed by: PODIATRIST

## 2018-10-10 RX ORDER — DULOXETIN HYDROCHLORIDE 60 MG/1
60 CAPSULE, DELAYED RELEASE ORAL 2 TIMES DAILY
Qty: 60 CAPSULE | Refills: 0 | Status: SHIPPED | OUTPATIENT
Start: 2018-10-10 | End: 2019-04-01 | Stop reason: ALTCHOICE

## 2018-10-10 NOTE — PROGRESS NOTES
Assessment/Plan:   Pes planus  Rule out tarsal tunnel syndrome  Rule out fibromyalgia  Generalized musculoskeletal disorder  Plan  Patient will need new orthotics  Today her feet have been bound with low dye arch strapping bilateral   We will rule out inflammatory arthropathy  We will rule out fibromyalgia  Will increase Cymbalta dosing to 60 mg b i d  Roman Silva There are no diagnoses linked to this encounter  Subjective:  Patient has pain in her feet  She has pain in the mid part of the arch bilateral  No tingling in her toes  The of note, patient does have low back pain  She is using orthotics  Patient ID: Yashira Hughes is a 48 y o  female      Past Medical History:   Diagnosis Date    Apnea 01/22/2009    Arthralgia     last assessed 6/28/13    Depression 10/09/2006    Dysmenorrhea 01/22/2009    Essential hypertension 3/8/2018    Exposure to potentially hazardous body fluids     last assessed 6/1/16    Insomnia 10/09/2006    Kidney stone     20 years ago    Muscle weakness (generalized) 08/17/2006    Obesity     Viral gastroenteritis     last assessed 6/13/13       Past Surgical History:   Procedure Laterality Date    APPENDECTOMY      last assessed 5/1/17    PLANTAR FASCIA SURGERY Right 3/30/2018    Procedure: Resection heel spur;  Surgeon: Pina Garcia DPM;  Location: [address] [place] [address];  Service: Podiatry    PLANTAR FASCIA SURGERY Left 5/4/2018    Procedure: RELEASE FASCIA PLANTAR/FASCIOTOMY;  Surgeon: Pina Garcia DPM;  Location: [address] [place] [address];  Service: Podiatry    [place] REMOVAL OF HEEL SPUR  5/4/2018    Procedure: Heel spur resection with partial plantar fasciectomy;  Surgeon: Pina Garcia DPM;  Location: [facility];  Service: Podiatry    SALPINGECTOMY      had a tubal pregnancy    SALPINGOSTOMY      TONSILLECTOMY         No Known Allergies      Current Outpatient Prescriptions:     amLODIPine (NORVASC) 10 mg tablet, Take 1 tablet (10 mg total) by mouth daily, Disp: 90 tablet, Rfl: 1   docusate sodium (RA COL-RITE) 100 mg capsule, Take 100 mg by mouth 2 (two) times a day, Disp: , Rfl:     DULoxetine (CYMBALTA) 30 mg delayed release capsule, Take 1 capsule (30 mg total) by mouth daily for 30 days, Disp: 90 capsule, Rfl: 1    etodolac (LODINE) 400 MG tablet, Take 1 tablet (400 mg total) by mouth 2 (two) times a day for 30 days, Disp: 60 tablet, Rfl: 0    etodolac (LODINE) 400 MG tablet, , Disp: , Rfl: 0    hydrOXYzine HCL (ATARAX) 25 mg tablet, Take 1 tablet (25 mg total) by mouth 3 (three) times a day for 3 days, Disp: 9 tablet, Rfl: 0    Liraglutide -Weight Management 18 MG/3ML SOPN, Inject 0 1 mL (0 6 mg total) under the skin once a week First week inject 0 6 mg, second week 1 2 mg, third week 1 8 mg, fourth week 2 4 mg, fifth week 3 0 mg, then continue on this dose, Disp: 0 4 mL, Rfl: 0    LYRICA 100 MG capsule, , Disp: , Rfl: 0    oxyCODONE-acetaminophen (PERCOCET)  mg per tablet, , Disp: , Rfl: 0    phentermine (ADIPEX-P) 37 5 MG tablet, Take 1 tablet (37 5 mg total) by mouth daily, Disp: 30 tablet, Rfl: 1    pregabalin (LYRICA) 75 mg capsule, Take 50 mg by mouth 3 (three) times a day, Disp: , Rfl:     traMADol (ULTRAM) 50 mg tablet, every 6 (six) hours as needed  , Disp: , Rfl: 0    Patient Active Problem List   Diagnosis    Acquired deformity of right foot    Closed nondisplaced fracture of body of right calcaneus    Plantar fasciitis    Right foot pain    Left foot pain    Acquired ankle/foot deformity    Reduced libido    Elevated testosterone level in female    Hirsutism    Hypercholesterolemia    Impaired fasting glucose    Nicotine dependence    Pes planus, congenital    Plantar fibromatosis    Plantar fasciitis of right foot    Heel spur, right    Shortness of breath    LBBB (left bundle branch block)    Essential hypertension    Dyslipidemia    Obesity (BMI 35 0-39 9 without comorbidity)    Abnormal weight gain    Sleep disturbance    Congenital pes planus    Difficulty walking    Plantar fascial fibromatosis    Bursitis of left foot    Heel spur, left    Generalized anxiety disorder    Radiculopathy of lumbar region    Acquired deformity of left foot    Cellulitis and abscess of toe of left foot    Open wound of left foot       HPI    The following portions of the patient's history were reviewed and updated as appropriate: allergies, current medications, past family history, past medical history, past social history, past surgical history and problem list     Review of Systems      Historical Information   Past Medical History:   Diagnosis Date    Apnea 01/22/2009    Arthralgia     last assessed 6/28/13    Depression 10/09/2006    Dysmenorrhea 01/22/2009    Essential hypertension 3/8/2018    Exposure to potentially hazardous body fluids     last assessed 6/1/16    Insomnia 10/09/2006    Kidney stone     20 years ago    Muscle weakness (generalized) 08/17/2006    Obesity     Viral gastroenteritis     last assessed 6/13/13     Past Surgical History:   Procedure Laterality Date    APPENDECTOMY      last assessed 5/1/17    PLANTAR FASCIA SURGERY Right 3/30/2018    Procedure: Resection heel spur;  Surgeon: Laura Mckenzie DPM;  Location: [facility];  Service: Podiatry    PLANTAR FASCIA SURGERY Left 5/4/2018    Procedure: RELEASE FASCIA PLANTAR/FASCIOTOMY;  Surgeon: Laura Mckenzie DPM;  Location: [address] [place] [address];  Service: Podiatry    [place] REMOVAL OF HEEL SPUR  5/4/2018    Procedure: Heel spur resection with partial plantar fasciectomy;  Surgeon: Laura Mckenzie DPM;  Location: [facility];  Service: Podiatry    SALPINGECTOMY      had a tubal pregnancy    SALPINGOSTOMY      TONSILLECTOMY       Social History   History   Alcohol Use No     History   Drug Use No     Family History:   Family History   Problem Relation Age of Onset    Diabetes Mother     Fibromyalgia Mother     Hypertension Father     Hyperthyroidism Father     Stroke Maternal Grandfather        Meds/Allergies   No Known Allergies    Current Outpatient Prescriptions on File Prior to Visit   Medication Sig Dispense Refill    amLODIPine (NORVASC) 10 mg tablet Take 1 tablet (10 mg total) by mouth daily 90 tablet 1    docusate sodium (RA COL-RITE) 100 mg capsule Take 100 mg by mouth 2 (two) times a day      DULoxetine (CYMBALTA) 30 mg delayed release capsule Take 1 capsule (30 mg total) by mouth daily for 30 days 90 capsule 1    etodolac (LODINE) 400 MG tablet Take 1 tablet (400 mg total) by mouth 2 (two) times a day for 30 days 60 tablet 0    etodolac (LODINE) 400 MG tablet   0    hydrOXYzine HCL (ATARAX) 25 mg tablet Take 1 tablet (25 mg total) by mouth 3 (three) times a day for 3 days 9 tablet 0    Liraglutide -Weight Management 18 MG/3ML SOPN Inject 0 1 mL (0 6 mg total) under the skin once a week First week inject 0 6 mg, second week 1 2 mg, third week 1 8 mg, fourth week 2 4 mg, fifth week 3 0 mg, then continue on this dose 0 4 mL 0    LYRICA 100 MG capsule   0    oxyCODONE-acetaminophen (PERCOCET)  mg per tablet   0    phentermine (ADIPEX-P) 37 5 MG tablet Take 1 tablet (37 5 mg total) by mouth daily 30 tablet 1    pregabalin (LYRICA) 75 mg capsule Take 50 mg by mouth 3 (three) times a day      traMADol (ULTRAM) 50 mg tablet every 6 (six) hours as needed    0     No current facility-administered medications on file prior to visit  Objective:  Patient's shoes and socks removed     Foot Exam    General  General Appearance: appears stated age and healthy   Orientation: alert and oriented to person, place, and time   Affect: appropriate   Gait: unimpaired       Right Foot/Ankle     Inspection and Palpation  Tenderness: metatarsals and plantar fascia   Swelling: metatarsals   Arch: pes planus  Hammertoes: fifth toe  Skin Exam: dry skin;     Neurovascular  Dorsalis pedis: 2+  Posterior tibial: 2+  Superficial peroneal nerve sensation: diminished  Achilles reflex: 2+    Muscle Strength  Ankle dorsiflexion: 4+    Range of Motion    Passive  Ankle dorsiflexion: 5      Tests  PT Tinel's sign: negative    Too many toes: positive       Left Foot/Ankle      Inspection and Palpation  Tenderness: metatarsals and plantar fascia   Swelling: metatarsals   Arch: pes planus  Hammertoes: fifth toe  Skin Exam: dry skin;     Neurovascular  Dorsalis pedis: 2+  Posterior tibial: 2+  Superficial peroneal nerve sensation: diminished  Deep peroneal nerve sensation: diminished  Achilles reflex: 2+    Muscle Strength  Ankle dorsiflexion: 4+    Range of Motion    Passive  Ankle dorsiflexion: 5      Tests  PT Tinel's sign: negative  Too many toes: positive     Comments  Foot bilateral is maximally pronated in stance and gait  Patient has Vanda Lonnie  Palpation of plantar fascia demonstrates no deficit or mass  Physical Exam   Cardiovascular:   Pulses:       Dorsalis pedis pulses are 2+ on the right side, and 2+ on the left side  Posterior tibial pulses are 2+ on the right side, and 2+ on the left side  Feet:   Right Foot:   Skin Integrity: Positive for dry skin  Left Foot:   Skin Integrity: Positive for dry skin  Neurological:   Reflex Scores:       Achilles reflexes are 2+ on the right side and 2+ on the left side

## 2018-10-12 LAB
ALBUMIN SERPL-MCNC: 4.2 G/DL (ref 3.5–5.5)
ALBUMIN/GLOB SERPL: 1.4 {RATIO} (ref 1.2–2.2)
ALP SERPL-CCNC: 91 IU/L (ref 39–117)
ALT SERPL-CCNC: 15 IU/L (ref 0–32)
AST SERPL-CCNC: 16 IU/L (ref 0–40)
BILIRUB SERPL-MCNC: 0.3 MG/DL (ref 0–1.2)
BUN SERPL-MCNC: 20 MG/DL (ref 6–24)
BUN/CREAT SERPL: 25 (ref 9–23)
CALCIUM SERPL-MCNC: 9.3 MG/DL (ref 8.7–10.2)
CHLORIDE SERPL-SCNC: 100 MMOL/L (ref 96–106)
CHOLEST SERPL-MCNC: 192 MG/DL (ref 100–199)
CO2 SERPL-SCNC: 23 MMOL/L (ref 20–29)
CREAT SERPL-MCNC: 0.81 MG/DL (ref 0.57–1)
EST. AVERAGE GLUCOSE BLD GHB EST-MCNC: 105 MG/DL
GLOBULIN SER-MCNC: 2.9 G/DL (ref 1.5–4.5)
GLUCOSE SERPL-MCNC: 104 MG/DL (ref 65–99)
HBA1C MFR BLD: 5.3 % (ref 4.8–5.6)
HCV AB S/CO SERPL IA: <0.1 S/CO RATIO (ref 0–0.9)
HDLC SERPL-MCNC: 43 MG/DL
LDLC SERPL CALC-MCNC: 89 MG/DL (ref 0–99)
LDLC/HDLC SERPL: 2.1 RATIO (ref 0–3.2)
MICRODELETION SYND BLD/T FISH: NORMAL
POTASSIUM SERPL-SCNC: 4.3 MMOL/L (ref 3.5–5.2)
PROT SERPL-MCNC: 7.1 G/DL (ref 6–8.5)
SL AMB EGFR AFRICAN AMERICAN: 96 ML/MIN/1.73
SL AMB EGFR NON AFRICAN AMERICAN: 83 ML/MIN/1.73
SL AMB VLDL CHOLESTEROL CALC: 60 MG/DL (ref 5–40)
SODIUM SERPL-SCNC: 139 MMOL/L (ref 134–144)
TRIGL SERPL-MCNC: 302 MG/DL (ref 0–149)

## 2018-10-22 ENCOUNTER — OFFICE VISIT (OUTPATIENT)
Dept: FAMILY MEDICINE CLINIC | Facility: CLINIC | Age: 53
End: 2018-10-22
Payer: COMMERCIAL

## 2018-10-22 VITALS
SYSTOLIC BLOOD PRESSURE: 134 MMHG | HEIGHT: 62 IN | TEMPERATURE: 98.3 F | WEIGHT: 187 LBS | RESPIRATION RATE: 16 BRPM | DIASTOLIC BLOOD PRESSURE: 84 MMHG | HEART RATE: 88 BPM | BODY MASS INDEX: 34.41 KG/M2

## 2018-10-22 DIAGNOSIS — Z12.31 SCREENING MAMMOGRAM, ENCOUNTER FOR: ICD-10-CM

## 2018-10-22 DIAGNOSIS — I10 ESSENTIAL HYPERTENSION: ICD-10-CM

## 2018-10-22 DIAGNOSIS — R73.01 IMPAIRED FASTING GLUCOSE: ICD-10-CM

## 2018-10-22 DIAGNOSIS — E66.9 OBESITY (BMI 30.0-34.9): ICD-10-CM

## 2018-10-22 DIAGNOSIS — Z00.00 ANNUAL PHYSICAL EXAM: Primary | ICD-10-CM

## 2018-10-22 PROBLEM — S91.302A OPEN WOUND OF LEFT FOOT: Status: RESOLVED | Noted: 2018-05-25 | Resolved: 2018-10-22

## 2018-10-22 PROBLEM — L02.612 CELLULITIS AND ABSCESS OF TOE OF LEFT FOOT: Status: RESOLVED | Noted: 2018-05-23 | Resolved: 2018-10-22

## 2018-10-22 PROBLEM — L03.032 CELLULITIS AND ABSCESS OF TOE OF LEFT FOOT: Status: RESOLVED | Noted: 2018-05-23 | Resolved: 2018-10-22

## 2018-10-22 PROBLEM — E66.811 OBESITY (BMI 30.0-34.9): Status: ACTIVE | Noted: 2018-10-22

## 2018-10-22 PROCEDURE — 99396 PREV VISIT EST AGE 40-64: CPT | Performed by: FAMILY MEDICINE

## 2018-10-22 PROCEDURE — 3725F SCREEN DEPRESSION PERFORMED: CPT | Performed by: FAMILY MEDICINE

## 2018-10-22 RX ORDER — PHENTERMINE HYDROCHLORIDE 37.5 MG/1
37.5 TABLET ORAL DAILY
Qty: 30 TABLET | Refills: 1 | Status: SHIPPED | OUTPATIENT
Start: 2018-10-22 | End: 2019-04-01 | Stop reason: ALTCHOICE

## 2018-10-22 RX ORDER — AMLODIPINE BESYLATE 10 MG/1
10 TABLET ORAL DAILY
Qty: 90 TABLET | Refills: 1 | Status: SHIPPED | OUTPATIENT
Start: 2018-10-22 | End: 2019-04-01 | Stop reason: SDUPTHER

## 2018-10-22 NOTE — PROGRESS NOTES
FAMILY PRACTICE HEALTH MAINTENANCE OFFICE VISIT  [facility]    NAME: Vinita Diehl  AGE: 48 y o  SEX: female  : 1965     DATE: 10/22/2018    Assessment and Plan     Problem List Items Addressed This Visit     Essential hypertension    Relevant Medications    amLODIPine (NORVASC) 10 mg tablet    Other Relevant Orders    CBC    Comprehensive metabolic panel    Lipid Panel with Direct LDL reflex    Impaired fasting glucose    Relevant Orders    Hemoglobin A1C    Obesity (BMI 30 0-34  9)    Relevant Medications    phentermine (ADIPEX-P) 37 5 MG tablet      Other Visit Diagnoses     Annual physical exam    -  Primary    BMI 34 0-34 9,adult        Improved, she is down 5 pounds    Relevant Medications    phentermine (ADIPEX-P) 37 5 MG tablet    Screening mammogram, encounter for        Relevant Orders    Mammo screening bilateral w cad            · Patient Counseling:   · Nutrition: Stressed importance of a well balanced diet, moderation of sodium/saturated fat, caloric balance and sufficient intake of fiber  · Exercise: Stressed the importance of regular exercise with a goal of 150 minutes per week  · Dental Health: Discussed daily flossing and brushing and regular dental visits     · Immunizations reviewed Flu shot declined, Adacel up to date  · Discussed benefits of screening mammogram and colonoscopy  She is going to call the GI to schedule her colonoscopy  · Discussed the patient's BMI with her  The BMI is above average; BMI management plan is completed     Return in about 6 months (around 2019) for Next scheduled follow up  Chief Complaint     Chief Complaint   Patient presents with    Physical Exam       History of Present Illness     She has been feeling well  She is active at work, but not exercising           Well Adult Physical   Patient here for a comprehensive physical exam       Diet and Physical Activity  Diet: well balanced diet  Weight concerns: Patient has class 1 obesity (BMI 30-34  9)  Exercise: frequently      Depression Screen  PHQ-9 Depression Screening    PHQ-9:    Frequency of the following problems over the past two weeks:       Little interest or pleasure in doing things:  0 - not at all  Feeling down, depressed, or hopeless:  0 - not at all  PHQ-2 Score:  0          General Health  Hearing: Normal:  bilateral  Vision: no vision problems  Dental: regular dental visits    Reproductive Health  No concerns       The following portions of the patient's history were reviewed and updated as appropriate: allergies, current medications, past family history, past medical history, past social history, past surgical history and problem list     Review of Systems     Review of Systems   Respiratory: Negative  Cardiovascular: Negative      Musculoskeletal:        Foot pain       Past Medical History     Past Medical History:   Diagnosis Date    Apnea 01/22/2009    Arthralgia     last assessed 6/28/13    Depression 10/09/2006    Dysmenorrhea 01/22/2009    Essential hypertension 3/8/2018    Exposure to potentially hazardous body fluids     last assessed 6/1/16    Insomnia 10/09/2006    Kidney stone     20 years ago    Muscle weakness (generalized) 08/17/2006    Obesity     Viral gastroenteritis     last assessed 6/13/13       Past Surgical History     Past Surgical History:   Procedure Laterality Date    APPENDECTOMY      last assessed 5/1/17    PLANTAR FASCIA SURGERY Right 3/30/2018    Procedure: Resection heel spur;  Surgeon: Humberto Laguerre DPM;  Location: [address] [place] [address];  Service: Podiatry    PLANTAR FASCIA SURGERY Left 5/4/2018    Procedure: RELEASE FASCIA PLANTAR/FASCIOTOMY;  Surgeon: Humberto Laguerre DPM;  Location: [address] [place] [address];  Service: Podiatry    [place] REMOVAL OF HEEL SPUR  5/4/2018    Procedure: Heel spur resection with partial plantar fasciectomy;  Surgeon: Humberto Laguerre DPM;  Location: [facility];  Service: Podiatry    SALPINGECTOMY had a tubal pregnancy    SALPINGOSTOMY      TONSILLECTOMY         Social History     Social History     Social History    Marital status: /Civil Union     Spouse name: N/A    Number of children: N/A    Years of education: N/A     Social History Main Topics    Smoking status: Former Smoker     Years: 30 00     Quit date: 3/8/2014    Smokeless tobacco: Never Used    Alcohol use No    Drug use: No    Sexual activity: Not Asked     Other Topics Concern    None     Social History Narrative    None       Family History     Family History   Problem Relation Age of Onset    Diabetes Mother     Fibromyalgia Mother     Hypertension Father     Hyperthyroidism Father     Stroke Maternal Grandfather     Mental illness Neg Hx        Current Medications       Current Outpatient Prescriptions:     DULoxetine (CYMBALTA) 60 mg delayed release capsule, Take 1 capsule (60 mg total) by mouth 2 (two) times a day for 30 days, Disp: 60 capsule, Rfl: 0    amLODIPine (NORVASC) 10 mg tablet, Take 1 tablet (10 mg total) by mouth daily, Disp: 90 tablet, Rfl: 1    etodolac (LODINE) 400 MG tablet, Take 1 tablet (400 mg total) by mouth 2 (two) times a day for 30 days, Disp: 60 tablet, Rfl: 0    hydrOXYzine HCL (ATARAX) 25 mg tablet, Take 1 tablet (25 mg total) by mouth 3 (three) times a day for 3 days, Disp: 9 tablet, Rfl: 0    phentermine (ADIPEX-P) 37 5 MG tablet, Take 1 tablet (37 5 mg total) by mouth daily, Disp: 30 tablet, Rfl: 1     Allergies     No Known Allergies    Objective     /84   Pulse 88   Temp 98 3 °F (36 8 °C)   Resp 16   Ht 5' 2" (1 575 m)   Wt 84 8 kg (187 lb)   BMI 34 20 kg/m²      Physical Exam   Constitutional: She is oriented to person, place, and time  She appears well-developed and well-nourished  HENT:   Head: Normocephalic and atraumatic     Right Ear: External ear normal    Left Ear: External ear normal    Mouth/Throat: Oropharynx is clear and moist    Eyes: Pupils are equal, round, and reactive to light  Neck: Normal range of motion  Cardiovascular: Normal rate, regular rhythm and normal heart sounds  Exam reveals no friction rub  No murmur heard  Pulmonary/Chest: Effort normal and breath sounds normal  No respiratory distress  She has no wheezes  She has no rales  Abdominal: Soft  Bowel sounds are normal  She exhibits no distension and no mass  There is no tenderness  There is no rebound and no guarding  Musculoskeletal: Normal range of motion  She exhibits no edema or deformity  Neurological: She is alert and oriented to person, place, and time  She has normal reflexes  Skin: Skin is warm  Psychiatric: She has a normal mood and affect  Her behavior is normal  Judgment and thought content normal    Nursing note and vitals reviewed           Visual Acuity Screening    Right eye Left eye Both eyes   Without correction: 20/25 20/25 20/25   With correction:          Health Maintenance     Health Maintenance   Topic Date Due    MAMMOGRAM  1965    CRC Screening: Colonoscopy  1965    INFLUENZA VACCINE  07/01/2018    Depression Screening PHQ  04/09/2019    PAP SMEAR  12/21/2020    DTaP,Tdap,and Td Vaccines (2 - Td) 05/01/2027     Immunization History   Administered Date(s) Administered    Tdap 05/01/2017    Tuberculin Skin Test-PPD Intradermal 10/27/2014, 11/05/2014, 12/01/2015, 11/07/2016, 07/10/2018       Ruthie Addison DO  [facility]

## 2018-11-08 ENCOUNTER — OFFICE VISIT (OUTPATIENT)
Dept: PODIATRY | Facility: CLINIC | Age: 53
End: 2018-11-08
Payer: COMMERCIAL

## 2018-11-08 VITALS
WEIGHT: 187 LBS | BODY MASS INDEX: 34.41 KG/M2 | HEART RATE: 89 BPM | SYSTOLIC BLOOD PRESSURE: 130 MMHG | DIASTOLIC BLOOD PRESSURE: 87 MMHG | RESPIRATION RATE: 17 BRPM | HEIGHT: 62 IN

## 2018-11-08 DIAGNOSIS — M21.622 TAILOR'S BUNION OF BOTH FEET: ICD-10-CM

## 2018-11-08 DIAGNOSIS — M21.962 ACQUIRED DEFORMITY OF LEFT ANKLE AND FOOT: ICD-10-CM

## 2018-11-08 DIAGNOSIS — Q66.50 CONGENITAL PES PLANUS, UNSPECIFIED LATERALITY: Primary | ICD-10-CM

## 2018-11-08 DIAGNOSIS — M21.621 TAILOR'S BUNION OF BOTH FEET: ICD-10-CM

## 2018-11-08 DIAGNOSIS — M79.672 LEFT FOOT PAIN: ICD-10-CM

## 2018-11-08 DIAGNOSIS — M79.671 RIGHT FOOT PAIN: ICD-10-CM

## 2018-11-08 PROCEDURE — 99213 OFFICE O/P EST LOW 20 MIN: CPT | Performed by: PODIATRIST

## 2018-11-08 RX ORDER — DULOXETIN HYDROCHLORIDE 60 MG/1
60 CAPSULE, DELAYED RELEASE ORAL 2 TIMES DAILY
Qty: 180 CAPSULE | Refills: 0 | Status: SHIPPED | OUTPATIENT
Start: 2018-11-08 | End: 2019-04-01 | Stop reason: ALTCHOICE

## 2018-11-08 NOTE — PROGRESS NOTES
Assessment/Plan:  Tailor bunion bilateral   Bursitis  Pes planus  Pain  Plan  Patient is in wrong size shoe  She will increase shoe size  In addition, patient remain on Cymbalta twice daily  She is negative for inflammatory arthropathy  No problem-specific Assessment & Plan notes found for this encounter  There are no diagnoses linked to this encounter  Subjective:  Patient is feeling much better    Her biggest complaint today is pain on the outside of her feet bilateral   No history of trauma    Past Medical History:   Diagnosis Date    Apnea 01/22/2009    Arthralgia     last assessed 6/28/13    Depression 10/09/2006    Dysmenorrhea 01/22/2009    Essential hypertension 3/8/2018    Exposure to potentially hazardous body fluids     last assessed 6/1/16    Insomnia 10/09/2006    Kidney stone     20 years ago    Muscle weakness (generalized) 08/17/2006    Obesity     Viral gastroenteritis     last assessed 6/13/13       Past Surgical History:   Procedure Laterality Date    APPENDECTOMY      last assessed 5/1/17    PLANTAR FASCIA SURGERY Right 3/30/2018    Procedure: Resection heel spur;  Surgeon: Selene Martinez DPM;  Location: [address] [place] [address];  Service: Podiatry    PLANTAR FASCIA SURGERY Left 5/4/2018    Procedure: RELEASE FASCIA PLANTAR/FASCIOTOMY;  Surgeon: Selene Martinez DPM;  Location: [address] [place] [address];  Service: Podiatry    [place] REMOVAL OF HEEL SPUR  5/4/2018    Procedure: Heel spur resection with partial plantar fasciectomy;  Surgeon: Selene Martinez DPM;  [facility];  Service: Podiatry    SALPINGECTOMY      had a tubal pregnancy    SALPINGOSTOMY      TONSILLECTOMY         No Known Allergies      Current Outpatient Prescriptions:     amLODIPine (NORVASC) 10 mg tablet, Take 1 tablet (10 mg total) by mouth daily, Disp: 90 tablet, Rfl: 1    DULoxetine (CYMBALTA) 60 mg delayed release capsule, Take 1 capsule (60 mg total) by mouth 2 (two) times a day for 30 days, Disp: 60 capsule, Rfl: 0    etodolac (LODINE) 400 MG tablet, Take 1 tablet (400 mg total) by mouth 2 (two) times a day for 30 days, Disp: 60 tablet, Rfl: 0    hydrOXYzine HCL (ATARAX) 25 mg tablet, Take 1 tablet (25 mg total) by mouth 3 (three) times a day for 3 days, Disp: 9 tablet, Rfl: 0    phentermine (ADIPEX-P) 37 5 MG tablet, Take 1 tablet (37 5 mg total) by mouth daily, Disp: 30 tablet, Rfl: 1    Patient Active Problem List   Diagnosis    Acquired deformity of right foot    Closed nondisplaced fracture of body of right calcaneus    Plantar fasciitis    Right foot pain    Left foot pain    Acquired ankle/foot deformity    Reduced libido    Elevated testosterone level in female    Hirsutism    Hypercholesterolemia    Impaired fasting glucose    Nicotine dependence    Pes planus, congenital    Plantar fibromatosis    Plantar fasciitis of right foot    Heel spur, right    Shortness of breath    LBBB (left bundle branch block)    Essential hypertension    Dyslipidemia    Abnormal weight gain    Sleep disturbance    Congenital pes planus    Difficulty walking    Plantar fascial fibromatosis    Bursitis of left foot    Heel spur, left    Generalized anxiety disorder    Radiculopathy of lumbar region    Acquired deformity of left foot    Metatarsalgia of both feet    Pain in both feet    Obesity (BMI 30 0-34  9)          Patient ID: Nadja Keith is a 48 y o  female  HPI    The following portions of the patient's history were reviewed and updated as appropriate: allergies, current medications, past family history, past medical history, past social history, past surgical history and problem list     Review of Systems      Objective:  Patient's shoes and socks removed     Foot ExamPhysical Exam   Musculoskeletal:   Tailor bunion noted bilateral          General  General Appearance: appears stated age and healthy   Orientation: alert and oriented to person, place, and time   Affect: appropriate   Gait: unimpaired         Right Foot/Ankle      Inspection and Palpation  Tenderness: metatarsals and plantar fascia   Swelling: metatarsals   Arch: pes planus  Hammertoes: fifth toe  Skin Exam: dry skin;      Neurovascular  Dorsalis pedis: 2+  Posterior tibial: 2+  Superficial peroneal nerve sensation: diminished  Achilles reflex: 2+     Muscle Strength  Ankle dorsiflexion: 4+     Range of Motion     Passive  Ankle dorsiflexion: 5        Tests  PT Tinel's sign: negative    Too many toes: positive         Left Foot/Ankle       Inspection and Palpation  Tenderness: metatarsals and plantar fascia   Swelling: metatarsals   Arch: pes planus  Hammertoes: fifth toe  Skin Exam: dry skin;      Neurovascular  Dorsalis pedis: 2+  Posterior tibial: 2+  Superficial peroneal nerve sensation: diminished  Deep peroneal nerve sensation: diminished  Achilles reflex: 2+     Muscle Strength  Ankle dorsiflexion: 4+     Range of Motion     Passive  Ankle dorsiflexion: 5        Tests  PT Tinel's sign: negative  Too many toes: positive      Comments  Foot bilateral is maximally pronated in stance and gait  Patient has Hillery Speak  Palpation of plantar fascia demonstrates no deficit or mass  Physical Exam   Cardiovascular:   Pulses:       Dorsalis pedis pulses are 2+ on the right side, and 2+ on the left side  Posterior tibial pulses are 2+ on the right side, and 2+ on the left side  Feet:   Right Foot:   Skin Integrity: Positive for dry skin  Left Foot:   Skin Integrity: Positive for dry skin  Neurological:   Reflex Scores:       Achilles reflexes are 2+ on the right side and 2+ on the left side

## 2018-12-27 ENCOUNTER — OFFICE VISIT (OUTPATIENT)
Dept: BEHAVIORAL/MENTAL HEALTH CLINIC | Facility: CLINIC | Age: 53
End: 2018-12-27
Payer: COMMERCIAL

## 2018-12-27 DIAGNOSIS — F43.20 ADJUSTMENT DISORDER, UNSPECIFIED TYPE: Primary | ICD-10-CM

## 2018-12-27 PROCEDURE — 90834 PSYTX W PT 45 MINUTES: CPT | Performed by: SOCIAL WORKER

## 2019-01-04 NOTE — PSYCH
Assessment/Plan:     F43 20  Adjustment disorder, unspecified  Subjective:     Patient ID: Arpita Aguilera is a 48 y o  female who was accompanied by her  for today's initial family counseling session  At times during the session, the patient questioned why her  was present in the session but when her  volunteered to leave the session, there was no response  This family is well known to the undersigned therapist from currently providing outpatient counseling services to the patient's adoptive daughter  This session was scheduled upon recommendation by the undersigned therapist to further assess the patient's estranged relationship with her adoptive daughter  Pre-session, the patient's father had been concerned about the patient participating in a family session with her adoptive daughter due to her volatility and her daughter's fragile emotional state  As a result, the undersigned therapist agreed to schedule either an individual or couples session with the patient and her   The undersigned therapist provided the patient with an orientation to [facility] including reviewing the counseling experience, counseling process and philosophy of treatment  Discussed policies and procedures focusing on reviewing the limitations of confidentiality and emergency policies  Began the process of building rapport and gaining a thorough understanding of the presenting problem by obtaining the patient's family history as relevant to the presenting problem  The patient was appropriately concerned about her adoptive daughter's recent history of suicidal ideation and cutting behavior  As a result, the undersigned therapist provided psychoeducation regarding cutting behavior and suicidal ideation as well as treatment options  The patient feels victimatized by her  and adoptive daughter    She feels blamed for her adoptive daughter's mental health issues and complained that her parenting is undermined by her   HPI    Review of Systems      Objective: This patient presented for today's session as her stated age  She was dressed neatly and casually with adequate hygiene  She was oriented x3 and maintained good eye contact  The patient demonstrated adequate focus and concentration  Her speech was pressured at times  Short- and long-term memory appeared intact  The patient presented with a cooperative attitude but was very controlling  Her mood was agitated with a blunted affect  There was no evidence of a thought disorder or psychosis  The patient denied suicidal ideation, gesture or plan  Her intelligence was estimated to be average  The patient's insight and judgment appeared to be poor         Physical Exam

## 2019-04-01 ENCOUNTER — OFFICE VISIT (OUTPATIENT)
Dept: FAMILY MEDICINE CLINIC | Facility: CLINIC | Age: 54
End: 2019-04-01
Payer: COMMERCIAL

## 2019-04-01 VITALS
HEART RATE: 82 BPM | TEMPERATURE: 98.6 F | RESPIRATION RATE: 20 BRPM | DIASTOLIC BLOOD PRESSURE: 80 MMHG | HEIGHT: 62 IN | SYSTOLIC BLOOD PRESSURE: 132 MMHG | WEIGHT: 197 LBS | BODY MASS INDEX: 36.25 KG/M2

## 2019-04-01 DIAGNOSIS — R73.01 IMPAIRED FASTING GLUCOSE: ICD-10-CM

## 2019-04-01 DIAGNOSIS — F41.1 GENERALIZED ANXIETY DISORDER: ICD-10-CM

## 2019-04-01 DIAGNOSIS — Z12.31 SCREENING MAMMOGRAM, ENCOUNTER FOR: ICD-10-CM

## 2019-04-01 DIAGNOSIS — I10 ESSENTIAL HYPERTENSION: Primary | ICD-10-CM

## 2019-04-01 DIAGNOSIS — E66.01 SEVERE OBESITY (BMI 35.0-39.9) WITH COMORBIDITY (HCC): ICD-10-CM

## 2019-04-01 DIAGNOSIS — Z12.11 COLON CANCER SCREENING: ICD-10-CM

## 2019-04-01 PROBLEM — R63.5 ABNORMAL WEIGHT GAIN: Status: RESOLVED | Noted: 2018-03-14 | Resolved: 2019-04-01

## 2019-04-01 PROCEDURE — 3008F BODY MASS INDEX DOCD: CPT | Performed by: FAMILY MEDICINE

## 2019-04-01 PROCEDURE — 3079F DIAST BP 80-89 MM HG: CPT | Performed by: FAMILY MEDICINE

## 2019-04-01 PROCEDURE — 3075F SYST BP GE 130 - 139MM HG: CPT | Performed by: FAMILY MEDICINE

## 2019-04-01 PROCEDURE — 99214 OFFICE O/P EST MOD 30 MIN: CPT | Performed by: FAMILY MEDICINE

## 2019-04-01 PROCEDURE — 1036F TOBACCO NON-USER: CPT | Performed by: FAMILY MEDICINE

## 2019-04-01 RX ORDER — DULOXETIN HYDROCHLORIDE 60 MG/1
60 CAPSULE, DELAYED RELEASE ORAL DAILY
Qty: 90 CAPSULE | Refills: 1 | Status: SHIPPED | OUTPATIENT
Start: 2019-04-01 | End: 2019-12-03 | Stop reason: ALTCHOICE

## 2019-04-01 RX ORDER — DULOXETIN HYDROCHLORIDE 60 MG/1
20 CAPSULE, DELAYED RELEASE ORAL DAILY
COMMUNITY
End: 2019-04-01 | Stop reason: SDUPTHER

## 2019-04-01 RX ORDER — AMLODIPINE BESYLATE 10 MG/1
10 TABLET ORAL DAILY
Qty: 90 TABLET | Refills: 1 | Status: SHIPPED | OUTPATIENT
Start: 2019-04-01 | End: 2019-12-03 | Stop reason: ALTCHOICE

## 2019-10-10 ENCOUNTER — OFFICE VISIT (OUTPATIENT)
Dept: PODIATRY | Facility: CLINIC | Age: 54
End: 2019-10-10
Payer: COMMERCIAL

## 2019-10-10 VITALS
DIASTOLIC BLOOD PRESSURE: 90 MMHG | WEIGHT: 197 LBS | SYSTOLIC BLOOD PRESSURE: 157 MMHG | HEIGHT: 62 IN | BODY MASS INDEX: 36.25 KG/M2

## 2019-10-10 DIAGNOSIS — M77.42 METATARSALGIA OF BOTH FEET: ICD-10-CM

## 2019-10-10 DIAGNOSIS — M77.41 METATARSALGIA OF BOTH FEET: ICD-10-CM

## 2019-10-10 DIAGNOSIS — G57.52 TARSAL TUNNEL SYNDROME OF LEFT SIDE: Primary | ICD-10-CM

## 2019-10-10 DIAGNOSIS — M54.16 RADICULOPATHY OF LUMBAR REGION: ICD-10-CM

## 2019-10-10 PROCEDURE — 99212 OFFICE O/P EST SF 10 MIN: CPT | Performed by: PODIATRIST

## 2019-10-10 PROCEDURE — 64450 NJX AA&/STRD OTHER PN/BRANCH: CPT | Performed by: PODIATRIST

## 2019-10-10 RX ORDER — OXYCODONE HYDROCHLORIDE AND ACETAMINOPHEN 5; 325 MG/1; MG/1
1 TABLET ORAL EVERY 8 HOURS PRN
Qty: 15 TABLET | Refills: 0 | Status: SHIPPED | OUTPATIENT
Start: 2019-10-10 | End: 2019-10-10 | Stop reason: SDUPTHER

## 2019-10-10 RX ORDER — OXYCODONE HYDROCHLORIDE AND ACETAMINOPHEN 5; 325 MG/1; MG/1
1 TABLET ORAL EVERY 8 HOURS PRN
Qty: 15 TABLET | Refills: 0 | Status: SHIPPED | OUTPATIENT
Start: 2019-10-10 | End: 2019-10-15

## 2019-10-10 RX ORDER — METHYLPREDNISOLONE 4 MG/1
TABLET ORAL
Qty: 21 TABLET | Refills: 0 | Status: SHIPPED | OUTPATIENT
Start: 2019-10-10 | End: 2019-12-03 | Stop reason: ALTCHOICE

## 2019-10-10 RX ORDER — GABAPENTIN 100 MG/1
100 CAPSULE ORAL 3 TIMES DAILY
Qty: 90 CAPSULE | Refills: 0 | Status: SHIPPED | OUTPATIENT
Start: 2019-10-10 | End: 2019-12-03 | Stop reason: ALTCHOICE

## 2019-10-10 NOTE — PROGRESS NOTES
Assessment/Plan:  Rule out tarsal tunnel syndrome bilateral   Rule out neuropathy  Rule out radiculopathy  Foot pain  Pes planus bilateral     Plan  Foot exam performed  We will rule out above-stated differential diagnosis  Today, diagnostic nerve block done  Into left tarsal tunnel, 1 cc Celestone with 2 cc 2 percent lidocaine plain injected without pain or complication is nerve block  Medications ordered  We will control patient pain  We will add gabapentin  Patient has been referred to neurology for EMG testing  No problem-specific Assessment & Plan notes found for this encounter  Diagnoses and all orders for this visit:    Tarsal tunnel syndrome of left side  -     methylPREDNISolone 4 MG tablet therapy pack; Use as directed on package  -     gabapentin (NEURONTIN) 100 mg capsule; Take 1 capsule (100 mg total) by mouth 3 (three) times a day  -     EMG 2 limb lower extremity; Future  -     Discontinue: oxyCODONE-acetaminophen (PERCOCET) 5-325 mg per tablet; Take 1 tablet by mouth every 8 (eight) hours as needed for moderate pain for up to 5 daysMax Daily Amount: 3 tablets  -     oxyCODONE-acetaminophen (PERCOCET) 5-325 mg per tablet; Take 1 tablet by mouth every 8 (eight) hours as needed for moderate pain for up to 5 daysMax Daily Amount: 3 tablets    Metatarsalgia of both feet  -     methylPREDNISolone 4 MG tablet therapy pack; Use as directed on package  -     gabapentin (NEURONTIN) 100 mg capsule; Take 1 capsule (100 mg total) by mouth 3 (three) times a day  -     EMG 2 limb lower extremity; Future  -     Discontinue: oxyCODONE-acetaminophen (PERCOCET) 5-325 mg per tablet; Take 1 tablet by mouth every 8 (eight) hours as needed for moderate pain for up to 5 daysMax Daily Amount: 3 tablets  -     oxyCODONE-acetaminophen (PERCOCET) 5-325 mg per tablet;  Take 1 tablet by mouth every 8 (eight) hours as needed for moderate pain for up to 5 daysMax Daily Amount: 3 tablets    Radiculopathy of lumbar region  -     methylPREDNISolone 4 MG tablet therapy pack; Use as directed on package  -     gabapentin (NEURONTIN) 100 mg capsule; Take 1 capsule (100 mg total) by mouth 3 (three) times a day  -     EMG 2 limb lower extremity; Future  -     Discontinue: oxyCODONE-acetaminophen (PERCOCET) 5-325 mg per tablet; Take 1 tablet by mouth every 8 (eight) hours as needed for moderate pain for up to 5 daysMax Daily Amount: 3 tablets  -     oxyCODONE-acetaminophen (PERCOCET) 5-325 mg per tablet; Take 1 tablet by mouth every 8 (eight) hours as needed for moderate pain for up to 5 daysMax Daily Amount: 3 tablets          Subjective:  Patient does have pain in her feet  She has had pain for several months  No history of trauma  She has pain on the bottom in the toes  Patient relates no history of low back pain  Patient states she has been consuming alcohol regularly as of late      Past Medical History:   Diagnosis Date    Apnea 01/22/2009    Arthralgia     last assessed 6/28/13    Depression 10/09/2006    Dysmenorrhea 01/22/2009    Essential hypertension 3/8/2018    Exposure to potentially hazardous body fluids     last assessed 6/1/16    Insomnia 10/09/2006    Kidney stone     20 years ago    Muscle weakness (generalized) 08/17/2006    Obesity     Viral gastroenteritis     last assessed 6/13/13       Past Surgical History:   Procedure Laterality Date    APPENDECTOMY      last assessed 5/1/17    PLANTAR FASCIA SURGERY Right 3/30/2018    Procedure: Resection heel spur;  Surgeon: Марина Pa DPM;  Location: [address] [place] [address];  Service: Podiatry    PLANTAR FASCIA SURGERY Left 5/4/2018    Procedure: RELEASE FASCIA PLANTAR/FASCIOTOMY;  Surgeon: Марина Pa DPM;  Location: [address] [place] [address];  Service: Podiatry    [place] REMOVAL OF HEEL SPUR  5/4/2018    Procedure: Heel spur resection with partial plantar fasciectomy;  Surgeon: Марина Pa DPM;  Location: [facility];  Service: Podiatry    SALPINGECTOMY      had a tubal pregnancy  SALPINGOSTOMY      TONSILLECTOMY         No Known Allergies      Current Outpatient Medications:     amLODIPine (NORVASC) 10 mg tablet, Take 1 tablet (10 mg total) by mouth daily, Disp: 90 tablet, Rfl: 1    DULoxetine (CYMBALTA) 60 mg delayed release capsule, Take 1 capsule (60 mg total) by mouth daily, Disp: 90 capsule, Rfl: 1    gabapentin (NEURONTIN) 100 mg capsule, Take 1 capsule (100 mg total) by mouth 3 (three) times a day, Disp: 90 capsule, Rfl: 0    metFORMIN (GLUCOPHAGE) 500 mg tablet, Take 1 tablet (500 mg total) by mouth 2 (two) times a day with meals, Disp: 90 tablet, Rfl: 1    methylPREDNISolone 4 MG tablet therapy pack, Use as directed on package, Disp: 21 tablet, Rfl: 0    oxyCODONE-acetaminophen (PERCOCET) 5-325 mg per tablet, Take 1 tablet by mouth every 8 (eight) hours as needed for moderate pain for up to 5 daysMax Daily Amount: 3 tablets, Disp: 15 tablet, Rfl: 0    Patient Active Problem List   Diagnosis    Acquired deformity of right foot    Closed nondisplaced fracture of body of right calcaneus    Plantar fasciitis    Right foot pain    Left foot pain    Acquired ankle/foot deformity    Reduced libido    Elevated testosterone level in female    Hirsutism    Hypercholesterolemia    Impaired fasting glucose    Nicotine dependence    Pes planus, congenital    Plantar fibromatosis    Plantar fasciitis of right foot    Heel spur, right    Shortness of breath    LBBB (left bundle branch block)    Essential hypertension    Dyslipidemia    Sleep disturbance    Congenital pes planus    Difficulty walking    Plantar fascial fibromatosis    Bursitis of left foot    Heel spur, left    Generalized anxiety disorder    Radiculopathy of lumbar region    Acquired deformity of left foot    Metatarsalgia of both feet    Pain in both feet    Severe obesity (BMI 35 0-39  9) with comorbidity ([facility] [address] )    Tailor's bunion of both feet          Patient ID: Atif Boyd is a 47 y o  female  HPI    The following portions of the patient's history were reviewed and updated as appropriate:     family history includes Diabetes in her mother; Fibromyalgia in her mother; Hypertension in her father; Hyperthyroidism in her father; Stroke in her maternal grandfather  reports that she quit smoking about 5 years ago  She quit after 30 00 years of use  She has never used smokeless tobacco  She reports that she does not drink alcohol or use drugs  Vitals:    10/10/19 1627   BP: 157/90       Review of Systems      Objective:  Patient's shoes and socks removed  Foot Exam    General  General Appearance: appears stated age and healthy   Orientation: alert and oriented to person, place, and time   Affect: appropriate   Gait: antalgic       Right Foot/Ankle     Inspection and Palpation  Ecchymosis: none  Tenderness: metatarsals   Swelling: dorsum and metatarsals   Arch: pes planus    Neurovascular  Dorsalis pedis: 3+  Posterior tibial: 3+      Left Foot/Ankle      Inspection and Palpation  Ecchymosis: none  Tenderness: metatarsals   Swelling: dorsum and metatarsals   Arch: pes planus    Neurovascular  Dorsalis pedis: 3+  Posterior tibial: 3+        Physical Exam   Constitutional: She is oriented to person, place, and time  She appears well-developed and well-nourished  Cardiovascular: Normal rate and regular rhythm  Pulses:       Dorsalis pedis pulses are 3+ on the right side, and 3+ on the left side  Posterior tibial pulses are 3+ on the right side, and 3+ on the left side  Neurological: She is alert and oriented to person, place, and time  Positive Tinel of tarsal tunnel bilateral   Pain with compression tarsal tunnel bilateral   L5 test 5/5   Skin: Skin is warm and dry  Vitals reviewed          Procedures

## 2019-11-04 ENCOUNTER — OFFICE VISIT (OUTPATIENT)
Dept: PODIATRY | Facility: CLINIC | Age: 54
End: 2019-11-04
Payer: COMMERCIAL

## 2019-11-04 VITALS
SYSTOLIC BLOOD PRESSURE: 145 MMHG | RESPIRATION RATE: 17 BRPM | BODY MASS INDEX: 36.25 KG/M2 | DIASTOLIC BLOOD PRESSURE: 85 MMHG | HEIGHT: 62 IN | WEIGHT: 197 LBS

## 2019-11-04 DIAGNOSIS — M77.42 METATARSALGIA OF BOTH FEET: ICD-10-CM

## 2019-11-04 DIAGNOSIS — G57.52 TARSAL TUNNEL SYNDROME OF LEFT SIDE: Primary | ICD-10-CM

## 2019-11-04 DIAGNOSIS — M77.41 METATARSALGIA OF BOTH FEET: ICD-10-CM

## 2019-11-04 DIAGNOSIS — G57.51 TARSAL TUNNEL SYNDROME, RIGHT: ICD-10-CM

## 2019-11-04 DIAGNOSIS — E11.42 DIABETIC POLYNEUROPATHY ASSOCIATED WITH TYPE 2 DIABETES MELLITUS (HCC): ICD-10-CM

## 2019-11-04 DIAGNOSIS — M54.16 RADICULOPATHY OF LUMBAR REGION: ICD-10-CM

## 2019-11-04 PROCEDURE — 99213 OFFICE O/P EST LOW 20 MIN: CPT | Performed by: PODIATRIST

## 2019-11-04 RX ORDER — GABAPENTIN 300 MG/1
300 CAPSULE ORAL 3 TIMES DAILY
Qty: 90 CAPSULE | Refills: 1 | Status: SHIPPED | OUTPATIENT
Start: 2019-11-04 | End: 2020-03-02

## 2019-11-04 NOTE — PROGRESS NOTES
Assessment/Plan:  Neuralgia  Metatarsalgia  Rule out tarsal tunnel syndrome  Rule out diabetic neuropathy  Rule out radiculopathy  Plan  Await neurology workup  Increase gabapentin dosing to 300 mg 3 times a day  No problem-specific Assessment & Plan notes found for this encounter  Diagnoses and all orders for this visit:    Tarsal tunnel syndrome of left side  -     gabapentin (NEURONTIN) 300 mg capsule; Take 1 capsule (300 mg total) by mouth 3 (three) times a day    Metatarsalgia of both feet  -     gabapentin (NEURONTIN) 300 mg capsule; Take 1 capsule (300 mg total) by mouth 3 (three) times a day    Radiculopathy of lumbar region  -     gabapentin (NEURONTIN) 300 mg capsule; Take 1 capsule (300 mg total) by mouth 3 (three) times a day    Diabetic polyneuropathy associated with type 2 diabetes mellitus (HCC)  -     gabapentin (NEURONTIN) 300 mg capsule; Take 1 capsule (300 mg total) by mouth 3 (three) times a day    Tarsal tunnel syndrome, right          Subjective:  Patient has continued pain of her feet  Injection did not help  She is taking gabapentin with no relief    She has not seen Neurology as of yet    Past Medical History:   Diagnosis Date    Apnea 01/22/2009    Arthralgia     last assessed 6/28/13    Depression 10/09/2006    Dysmenorrhea 01/22/2009    Essential hypertension 3/8/2018    Exposure to potentially hazardous body fluids     last assessed 6/1/16    Insomnia 10/09/2006    Kidney stone     20 years ago    Muscle weakness (generalized) 08/17/2006    Obesity     Viral gastroenteritis     last assessed 6/13/13       Past Surgical History:   Procedure Laterality Date    APPENDECTOMY      last assessed 5/1/17    PLANTAR FASCIA SURGERY Right 3/30/2018    Procedure: Resection heel spur;  Surgeon: Shawnee West DPM;  Location: [address] [place] [address];  Service: Podiatry    PLANTAR FASCIA SURGERY Left 5/4/2018    Procedure: RELEASE FASCIA PLANTAR/FASCIOTOMY;  Surgeon: Shawnee West DPM; Location: [facility];  Service: Podiatry    [place] REMOVAL OF HEEL SPUR  5/4/2018    Procedure: Heel spur resection with partial plantar fasciectomy;  Surgeon: Seema Peters DPM;  [facility]: [facility];  Service: Podiatry    SALPINGECTOMY      had a tubal pregnancy    SALPINGOSTOMY      TONSILLECTOMY         No Known Allergies      Current Outpatient Medications:     amLODIPine (NORVASC) 10 mg tablet, Take 1 tablet (10 mg total) by mouth daily, Disp: 90 tablet, Rfl: 1    DULoxetine (CYMBALTA) 60 mg delayed release capsule, Take 1 capsule (60 mg total) by mouth daily, Disp: 90 capsule, Rfl: 1    gabapentin (NEURONTIN) 100 mg capsule, Take 1 capsule (100 mg total) by mouth 3 (three) times a day, Disp: 90 capsule, Rfl: 0    gabapentin (NEURONTIN) 300 mg capsule, Take 1 capsule (300 mg total) by mouth 3 (three) times a day, Disp: 90 capsule, Rfl: 1    metFORMIN (GLUCOPHAGE) 500 mg tablet, Take 1 tablet (500 mg total) by mouth 2 (two) times a day with meals, Disp: 90 tablet, Rfl: 1    methylPREDNISolone 4 MG tablet therapy pack, Use as directed on package, Disp: 21 tablet, Rfl: 0    Patient Active Problem List   Diagnosis    Acquired deformity of right foot    Closed nondisplaced fracture of body of right calcaneus    Plantar fasciitis    Right foot pain    Left foot pain    Acquired ankle/foot deformity    Reduced libido    Elevated testosterone level in female    Hirsutism    Hypercholesterolemia    Impaired fasting glucose    Nicotine dependence    Pes planus, congenital    Plantar fibromatosis    Plantar fasciitis of right foot    Heel spur, right    Shortness of breath    LBBB (left bundle branch block)    Essential hypertension    Dyslipidemia    Sleep disturbance    Congenital pes planus    Difficulty walking    Plantar fascial fibromatosis    Bursitis of left foot    Heel spur, left    Generalized anxiety disorder    Radiculopathy of lumbar region    Acquired deformity of left foot    Metatarsalgia of both feet    Pain in both feet    Severe obesity (BMI 35 0-39  9) with comorbidity ([address] )    Tailor's bunion of both feet          Patient ID: Audra Dyer is a 47 y o  female  HPI    The following portions of the patient's history were reviewed and updated as appropriate:     family history includes Diabetes in her mother; Fibromyalgia in her mother; Hypertension in her father; Hyperthyroidism in her father; Stroke in her maternal grandfather  reports that she quit smoking about 5 years ago  She quit after 30 00 years of use  She has never used smokeless tobacco  She reports that she does not drink alcohol or use drugs  Vitals:    11/04/19 1627   BP: 145/85   Resp: 17       Review of Systems      Objective:  Patient's shoes and socks removed  Foot Exam    General  General Appearance: appears stated age and healthy   Orientation: alert and oriented to person, place, and time   Affect: appropriate   Gait: antalgic       Right Foot/Ankle     Inspection and Palpation  Ecchymosis: none  Tenderness: calcaneus tenderness, bony tenderness, lesser metatarsophalangeal joints and plantar fascia   Swelling: dorsum and metatarsals   Arch: pes planus  Hammertoes: fifth toe    Neurovascular  Dorsalis pedis: 3+  Posterior tibial: 3+  Superficial peroneal nerve sensation: diminished  Deep peroneal nerve sensation: diminished      Left Foot/Ankle      Inspection and Palpation  Ecchymosis: none  Tenderness: bony tenderness, lesser metatarsophalangeal joints, plantar fascia and calcaneus tenderness   Swelling: dorsum and metatarsals   Arch: pes planus  Hammertoes: fifth toe    Neurovascular  Dorsalis pedis: 3+  Posterior tibial: 3+  Superficial peroneal nerve sensation: diminished  Deep peroneal nerve sensation: diminished        Physical Exam   Constitutional: She appears well-developed and well-nourished  Cardiovascular: Normal rate and regular rhythm     Pulses:       Dorsalis pedis pulses are 3+ on the right side, and 3+ on the left side  Posterior tibial pulses are 3+ on the right side, and 3+ on the left side  Musculoskeletal: She exhibits edema, tenderness and deformity  Right foot: There is bony tenderness  Left foot: There is bony tenderness  Neurological:   Positive Tinel bilateral tibial nerve  Positive   Vitals reviewed          Procedures

## 2019-12-03 ENCOUNTER — OFFICE VISIT (OUTPATIENT)
Dept: FAMILY MEDICINE CLINIC | Facility: CLINIC | Age: 54
End: 2019-12-03
Payer: COMMERCIAL

## 2019-12-03 VITALS
DIASTOLIC BLOOD PRESSURE: 90 MMHG | SYSTOLIC BLOOD PRESSURE: 144 MMHG | TEMPERATURE: 98.1 F | HEART RATE: 72 BPM | HEIGHT: 62 IN | WEIGHT: 190 LBS | RESPIRATION RATE: 16 BRPM | BODY MASS INDEX: 34.96 KG/M2

## 2019-12-03 DIAGNOSIS — J20.9 ACUTE BRONCHITIS, UNSPECIFIED ORGANISM: Primary | ICD-10-CM

## 2019-12-03 PROCEDURE — 1036F TOBACCO NON-USER: CPT | Performed by: NURSE PRACTITIONER

## 2019-12-03 PROCEDURE — 3008F BODY MASS INDEX DOCD: CPT | Performed by: NURSE PRACTITIONER

## 2019-12-03 PROCEDURE — 99213 OFFICE O/P EST LOW 20 MIN: CPT | Performed by: NURSE PRACTITIONER

## 2019-12-03 RX ORDER — DOXYCYCLINE HYCLATE 100 MG/1
100 CAPSULE ORAL EVERY 12 HOURS SCHEDULED
Qty: 20 CAPSULE | Refills: 0 | Status: SHIPPED | OUTPATIENT
Start: 2019-12-03 | End: 2019-12-13

## 2019-12-03 RX ORDER — ALBUTEROL SULFATE 90 UG/1
2 AEROSOL, METERED RESPIRATORY (INHALATION) EVERY 4 HOURS PRN
Qty: 1 INHALER | Refills: 5 | Status: SHIPPED | OUTPATIENT
Start: 2019-12-03 | End: 2021-07-14 | Stop reason: ALTCHOICE

## 2019-12-03 RX ORDER — PREDNISONE 10 MG/1
TABLET ORAL
Qty: 15 TABLET | Refills: 0 | Status: SHIPPED | OUTPATIENT
Start: 2019-12-03 | End: 2019-12-13

## 2019-12-03 NOTE — LETTER
December 3, 2019     Patient: Audra Dyer   YOB: 1965   Date of Visit: 12/3/2019       To Whom it May Concern:    Audra Dyer is under my professional care  She was seen in my office on 12/3/2019  She may return to work on 12/5/2019  If you have any questions or concerns, please don't hesitate to call           Sincerely,          DONTAE Brown        CC: No Recipients

## 2019-12-03 NOTE — PATIENT INSTRUCTIONS
Take medication with food  It is important that you take the entire course of antibiotics prescribed  May also take a probiotic of your choice to maintain healthy GI kirt  Can take some probiotic and yogurt with the medication  Increase fluid intake, saline nasal rinses, and hot tea with honey and lemon  Cool air humidification can be helpful as well  May take Ibuprofen or Tylenol as needed for pain or fevers  Mucinex D for sinus congestion or Coricidin HBP if you have high blood pressure or a heart condition  Mucinex or Robitussin DM are effective for cough and chest congestion  Supportive care discussed and advised  Advised to RTO for any worsening and no improvement  Follow up for no improvement and worsening of conditions  Patient advised and educated when to see immediate medical care

## 2019-12-03 NOTE — PROGRESS NOTES
Assessment/Plan:    1  Acute bronchitis, unspecified organism  -     doxycycline hyclate (VIBRAMYCIN) 100 mg capsule; Take 1 capsule (100 mg total) by mouth every 12 (twelve) hours for 10 days  -     predniSONE 10 mg tablet; Take 2 tablets x 5 days then 1 tablet x 5 days then d/c  -     albuterol (PROVENTIL HFA,VENTOLIN HFA) 90 mcg/act inhaler; Inhale 2 puffs every 4 (four) hours as needed for wheezing or shortness of breath          BMI Counseling: Body mass index is 34 75 kg/m²  Discussed the patient's BMI with her  The BMI is above normal  Nutrition recommendations include reducing portion sizes, decreasing overall calorie intake, 3-5 servings of fruits/vegetables daily, reducing fast food intake, consuming healthier snacks, decreasing soda and/or juice intake and moderation in carbohydrate intake  Patient Instructions: Take medication with food  It is important that you take the entire course of antibiotics prescribed  May also take a probiotic of your choice to maintain healthy GI kirt  Can take some probiotic and yogurt with the medication  Increase fluid intake, saline nasal rinses, and hot tea with honey and lemon  Cool air humidification can be helpful as well  May take Ibuprofen or Tylenol as needed for pain or fevers  Mucinex D for sinus congestion or Coricidin HBP if you have high blood pressure or a heart condition  Mucinex or Robitussin DM are effective for cough and chest congestion  Supportive care discussed and advised  Advised to RTO for any worsening and no improvement  Follow up for no improvement and worsening of conditions  Patient advised and educated when to see immediate medical care  Return if symptoms worsen or fail to improve        Future Appointments   Date Time Provider Nancie Baird   12/9/2019  4:30 PM Leila Sears  [facility]   1/31/2020  9:00 AM [facility]  [facility]           Subjective:      Patient ID: [facility] Christina Corea is a 47 y o  female  Chief Complaint   Patient presents with    Cough     lj    Cold Like Symptoms     sinus  & chest congestion--         Vitals:  /90   Pulse 72   Temp 98 1 °F (36 7 °C)   Resp 16   Ht 5' 2" (1 575 m)   Wt 86 2 kg (190 lb)   BMI 34 75 kg/m²     HPI  Patient stated that started with cough and congestion couple of days ago  Stated that symptoms getting worse  Denies fever, chills and sob  Taking DayQuil and NyQuil  The following portions of the patient's history were reviewed and updated as appropriate: allergies, current medications, past family history, past medical history, past social history, past surgical history and problem list       Review of Systems   Constitutional: Negative for chills, diaphoresis, fatigue, fever and unexpected weight change  HENT: Positive for congestion and sinus pressure  Negative for dental problem, drooling, ear discharge, ear pain, facial swelling, hearing loss, mouth sores, nosebleeds, postnasal drip, rhinorrhea, sinus pain, sneezing, sore throat, tinnitus, trouble swallowing and voice change  Respiratory: Positive for cough  Negative for chest tightness, shortness of breath and wheezing  Cardiovascular: Negative  Gastrointestinal: Negative for abdominal pain, constipation, diarrhea, nausea and vomiting  Musculoskeletal: Negative  Skin: Negative  Neurological: Negative for dizziness, weakness, light-headedness and headaches  Hematological: Negative            Objective:    Social History     Tobacco Use   Smoking Status Former Smoker    Years: 30 00    Last attempt to quit: 3/8/2014    Years since quittin 7   Smokeless Tobacco Never Used       Allergies: No Known Allergies      Current Outpatient Medications   Medication Sig Dispense Refill    gabapentin (NEURONTIN) 300 mg capsule Take 1 capsule (300 mg total) by mouth 3 (three) times a day 90 capsule 1    albuterol (PROVENTIL HFA,VENTOLIN HFA) 90 mcg/act inhaler Inhale 2 puffs every 4 (four) hours as needed for wheezing or shortness of breath 1 Inhaler 5    doxycycline hyclate (VIBRAMYCIN) 100 mg capsule Take 1 capsule (100 mg total) by mouth every 12 (twelve) hours for 10 days 20 capsule 0    predniSONE 10 mg tablet Take 2 tablets x 5 days then 1 tablet x 5 days then d/c 15 tablet 0     No current facility-administered medications for this visit  Physical Exam   Constitutional: She is oriented to person, place, and time  She appears well-developed and well-nourished  HENT:   Head: Normocephalic  Right Ear: Tympanic membrane, external ear and ear canal normal    Left Ear: Tympanic membrane, external ear and ear canal normal    Nose: Nose normal  Right sinus exhibits no maxillary sinus tenderness and no frontal sinus tenderness  Left sinus exhibits no maxillary sinus tenderness and no frontal sinus tenderness  Mouth/Throat: Oropharynx is clear and moist and mucous membranes are normal    Neck: Neck supple  Cardiovascular: Normal rate, regular rhythm and normal heart sounds  Pulmonary/Chest: Effort normal  She has wheezes  Abdominal: Normal appearance and bowel sounds are normal  There is no hepatosplenomegaly  There is no tenderness  There is no rebound  Musculoskeletal: Normal range of motion  Lymphadenopathy:        Right cervical: No superficial cervical and no posterior cervical adenopathy present  Left cervical: No superficial cervical and no posterior cervical adenopathy present  Neurological: She is alert and oriented to person, place, and time  Skin: Skin is warm and dry  Psychiatric: She has a normal mood and affect  Her behavior is normal  Judgment and thought content normal    Vitals reviewed                    DONTAE Ruano

## 2019-12-04 ENCOUNTER — TELEPHONE (OUTPATIENT)
Dept: FAMILY MEDICINE CLINIC | Facility: CLINIC | Age: 54
End: 2019-12-04

## 2019-12-04 NOTE — TELEPHONE ENCOUNTER
Patient needs the note to state that she has no work restrictions  It can be the exact same note that you gave her but just needs to say can return with no restrictions      Please call patient when complete and ready for   [id number]

## 2019-12-04 NOTE — LETTER
December 4, 2019     Patient: Patti Hernandez   YOB: 1965   Date of Visit: 12/4/2019       To Whom it May Concern:    Patti Hernandez is under my professional care  She was seen in my office on 12/3/2019  She may return to work on 12/5/2019 without any restrictions  If you have any questions or concerns, please don't hesitate to call           Sincerely,          Enmarieue Collet, CRNP          CC: No Recipients

## 2020-01-04 ENCOUNTER — APPOINTMENT (EMERGENCY)
Dept: RADIOLOGY | Facility: HOSPITAL | Age: 55
End: 2020-01-04
Payer: COMMERCIAL

## 2020-01-04 ENCOUNTER — HOSPITAL ENCOUNTER (EMERGENCY)
Facility: HOSPITAL | Age: 55
Discharge: HOME | End: 2020-01-04
Attending: EMERGENCY MEDICINE
Payer: COMMERCIAL

## 2020-01-04 VITALS
DIASTOLIC BLOOD PRESSURE: 78 MMHG | RESPIRATION RATE: 18 BRPM | OXYGEN SATURATION: 98 % | SYSTOLIC BLOOD PRESSURE: 148 MMHG | HEART RATE: 86 BPM | TEMPERATURE: 98.2 F

## 2020-01-04 DIAGNOSIS — K80.50 BILIARY COLIC SYMPTOM: Primary | ICD-10-CM

## 2020-01-04 LAB
ALBUMIN SERPL BCP-MCNC: 3.5 G/DL (ref 3.5–5)
ALP SERPL-CCNC: 90 U/L (ref 46–116)
ALT SERPL W P-5'-P-CCNC: 27 U/L (ref 12–78)
ANION GAP SERPL CALCULATED.3IONS-SCNC: 8 MMOL/L (ref 4–13)
AST SERPL W P-5'-P-CCNC: 22 U/L (ref 5–45)
BACTERIA UR QL AUTO: ABNORMAL /HPF
BASOPHILS # BLD AUTO: 0.09 THOUSANDS/ΜL (ref 0–0.1)
BASOPHILS NFR BLD AUTO: 1 % (ref 0–1)
BILIRUB SERPL-MCNC: 0.6 MG/DL (ref 0.2–1)
BILIRUB UR QL STRIP: ABNORMAL
BUN SERPL-MCNC: 15 MG/DL (ref 5–25)
CALCIUM SERPL-MCNC: 8.6 MG/DL (ref 8.3–10.1)
CAOX CRY URNS QL MICRO: ABNORMAL /HPF
CHLORIDE SERPL-SCNC: 103 MMOL/L (ref 100–108)
CLARITY UR: ABNORMAL
CO2 SERPL-SCNC: 27 MMOL/L (ref 21–32)
COLOR UR: YELLOW
CREAT SERPL-MCNC: 0.89 MG/DL (ref 0.6–1.3)
EOSINOPHIL # BLD AUTO: 0.19 THOUSAND/ΜL (ref 0–0.61)
EOSINOPHIL NFR BLD AUTO: 2 % (ref 0–6)
ERYTHROCYTE [DISTWIDTH] IN BLOOD BY AUTOMATED COUNT: 12.7 % (ref 11.6–15.1)
GFR SERPL CREATININE-BSD FRML MDRD: 74 ML/MIN/1.73SQ M
GLUCOSE SERPL-MCNC: 94 MG/DL (ref 65–140)
GLUCOSE UR STRIP-MCNC: NEGATIVE MG/DL
HCT VFR BLD AUTO: 50.1 % (ref 34.8–46.1)
HGB BLD-MCNC: 17.2 G/DL (ref 11.5–15.4)
HGB UR QL STRIP.AUTO: ABNORMAL
IMM GRANULOCYTES # BLD AUTO: 0.05 THOUSAND/UL (ref 0–0.2)
IMM GRANULOCYTES NFR BLD AUTO: 1 % (ref 0–2)
KETONES UR STRIP-MCNC: NEGATIVE MG/DL
LEUKOCYTE ESTERASE UR QL STRIP: ABNORMAL
LIPASE SERPL-CCNC: 95 U/L (ref 73–393)
LYMPHOCYTES # BLD AUTO: 1.99 THOUSANDS/ΜL (ref 0.6–4.47)
LYMPHOCYTES NFR BLD AUTO: 19 % (ref 14–44)
MCH RBC QN AUTO: 31.4 PG (ref 26.8–34.3)
MCHC RBC AUTO-ENTMCNC: 34.3 G/DL (ref 31.4–37.4)
MCV RBC AUTO: 91 FL (ref 82–98)
MONOCYTES # BLD AUTO: 0.94 THOUSAND/ΜL (ref 0.17–1.22)
MONOCYTES NFR BLD AUTO: 9 % (ref 4–12)
NEUTROPHILS # BLD AUTO: 7.18 THOUSANDS/ΜL (ref 1.85–7.62)
NEUTS SEG NFR BLD AUTO: 68 % (ref 43–75)
NITRITE UR QL STRIP: NEGATIVE
NON-SQ EPI CELLS URNS QL MICRO: ABNORMAL /HPF
NRBC BLD AUTO-RTO: 0 /100 WBCS
PH UR STRIP.AUTO: 6 [PH]
PLATELET # BLD AUTO: 327 THOUSANDS/UL (ref 149–390)
PMV BLD AUTO: 9.2 FL (ref 8.9–12.7)
POTASSIUM SERPL-SCNC: 4 MMOL/L (ref 3.5–5.3)
PROT SERPL-MCNC: 7.7 G/DL (ref 6.4–8.2)
PROT UR STRIP-MCNC: ABNORMAL MG/DL
RBC # BLD AUTO: 5.48 MILLION/UL (ref 3.81–5.12)
RBC #/AREA URNS AUTO: ABNORMAL /HPF
SODIUM SERPL-SCNC: 138 MMOL/L (ref 136–145)
SP GR UR STRIP.AUTO: 1.02 (ref 1–1.03)
UROBILINOGEN UR QL STRIP.AUTO: 1 E.U./DL
WBC # BLD AUTO: 10.44 THOUSAND/UL (ref 4.31–10.16)
WBC #/AREA URNS AUTO: ABNORMAL /HPF

## 2020-01-04 PROCEDURE — 96361 HYDRATE IV INFUSION ADD-ON: CPT

## 2020-01-04 PROCEDURE — 83690 ASSAY OF LIPASE: CPT | Performed by: EMERGENCY MEDICINE

## 2020-01-04 PROCEDURE — 99284 EMERGENCY DEPT VISIT MOD MDM: CPT

## 2020-01-04 PROCEDURE — 99284 EMERGENCY DEPT VISIT MOD MDM: CPT | Performed by: EMERGENCY MEDICINE

## 2020-01-04 PROCEDURE — 85025 COMPLETE CBC W/AUTO DIFF WBC: CPT | Performed by: EMERGENCY MEDICINE

## 2020-01-04 PROCEDURE — 96374 THER/PROPH/DIAG INJ IV PUSH: CPT

## 2020-01-04 PROCEDURE — 81001 URINALYSIS AUTO W/SCOPE: CPT | Performed by: EMERGENCY MEDICINE

## 2020-01-04 PROCEDURE — 74176 CT ABD & PELVIS W/O CONTRAST: CPT

## 2020-01-04 PROCEDURE — 80053 COMPREHEN METABOLIC PANEL: CPT | Performed by: EMERGENCY MEDICINE

## 2020-01-04 PROCEDURE — 36415 COLL VENOUS BLD VENIPUNCTURE: CPT | Performed by: EMERGENCY MEDICINE

## 2020-01-04 RX ORDER — KETOROLAC TROMETHAMINE 30 MG/ML
15 INJECTION, SOLUTION INTRAMUSCULAR; INTRAVENOUS ONCE
Status: COMPLETED | OUTPATIENT
Start: 2020-01-04 | End: 2020-01-04

## 2020-01-04 RX ORDER — HYDROCODONE BITARTRATE AND ACETAMINOPHEN 5; 325 MG/1; MG/1
1 TABLET ORAL EVERY 6 HOURS PRN
Qty: 20 TABLET | Refills: 0 | Status: SHIPPED | OUTPATIENT
Start: 2020-01-04 | End: 2020-01-11

## 2020-01-04 RX ADMIN — SODIUM CHLORIDE 1000 ML: 0.9 INJECTION, SOLUTION INTRAVENOUS at 12:53

## 2020-01-04 RX ADMIN — KETOROLAC TROMETHAMINE 15 MG: 30 INJECTION, SOLUTION INTRAMUSCULAR at 12:48

## 2020-01-04 NOTE — ED PROVIDER NOTES
History  Chief Complaint   Patient presents with    Flank Pain     pt presents to the ed with bilateral flank pain and 'brown urine" since new years (x4 days)      Patient states she has had upper abdominal and flank pain after a high fat meal on new year's  Patient feels distended and bloated, has been belching and feels like she needs to vomit but has not actually been vomiting  She states the pain radiating around to her back has become worse, is now associated with darker urine  She has had no fever chills, no jaundice  Patient has a history of kidney stones, states she does not have a history of gallstones or indigestion problems  Prior to Admission Medications   Prescriptions Last Dose Informant Patient Reported? Taking?    albuterol (PROVENTIL HFA,VENTOLIN HFA) 90 mcg/act inhaler   No No   Sig: Inhale 2 puffs every 4 (four) hours as needed for wheezing or shortness of breath   gabapentin (NEURONTIN) 300 mg capsule   No No   Sig: Take 1 capsule (300 mg total) by mouth 3 (three) times a day      Facility-Administered Medications: None       Past Medical History:   Diagnosis Date    Apnea 01/22/2009    Arthralgia     last assessed 6/28/13    Depression 10/09/2006    Dysmenorrhea 01/22/2009    Essential hypertension 3/8/2018    Exposure to potentially hazardous body fluids     last assessed 6/1/16    Insomnia 10/09/2006    Kidney stone     20 years ago    Muscle weakness (generalized) 08/17/2006    Obesity     Viral gastroenteritis     last assessed 6/13/13       Past Surgical History:   Procedure Laterality Date    APPENDECTOMY      last assessed 5/1/17    PLANTAR FASCIA SURGERY Right 3/30/2018    Procedure: Resection heel spur;  Surgeon: Colin Page DPM;  Location: [address] [place] [address];  Service: Podiatry    PLANTAR FASCIA SURGERY Left 5/4/2018    Procedure: RELEASE FASCIA PLANTAR/FASCIOTOMY;  Surgeon: Colin Page DPM;  Location: [facility];  Service: Podiatry    [address] [place] [address] 2018    Procedure: Heel spur resection with partial plantar fasciectomy;  Surgeon: Arsen Schneider DPM;  Location: [facility];  Service: Podiatry    SALPINGECTOMY      had a tubal pregnancy    SALPINGOSTOMY      TONSILLECTOMY         Family History   Problem Relation Age of Onset    Diabetes Mother     Fibromyalgia Mother     Hypertension Father     Hyperthyroidism Father     Stroke Maternal Grandfather     Mental illness Neg Hx      I have reviewed and agree with the history as documented  Social History     Tobacco Use    Smoking status: Former Smoker     Years: 30 00     Last attempt to quit: 3/8/2014     Years since quittin 8    Smokeless tobacco: Never Used   Substance Use Topics    Alcohol use: No    Drug use: No        Review of Systems   Constitutional: Negative for chills and fever  HENT: Negative for congestion and sore throat  Eyes: Negative for visual disturbance  Respiratory: Negative for cough and shortness of breath  Cardiovascular: Negative for chest pain  Gastrointestinal: Positive for abdominal distention, abdominal pain and nausea  Negative for vomiting  Genitourinary: Positive for flank pain and hematuria  Negative for dysuria  Musculoskeletal: Positive for back pain  Negative for neck pain  Skin: Negative for rash  Neurological: Negative for weakness and headaches  Hematological: Does not bruise/bleed easily  Psychiatric/Behavioral: Negative for confusion  All other systems reviewed and are negative  Physical Exam  Physical Exam   Constitutional: She is oriented to person, place, and time  She appears well-developed and well-nourished  HENT:   Head: Normocephalic and atraumatic  Mouth/Throat: Oropharynx is clear and moist    Eyes: Conjunctivae are normal    Neck: Normal range of motion  Cardiovascular: Normal rate, regular rhythm and normal heart sounds  Pulmonary/Chest: Effort normal and breath sounds normal    Abdominal: Soft   Bowel sounds are normal  There is tenderness  Musculoskeletal: Normal range of motion  She exhibits no tenderness  Neurological: She is alert and oriented to person, place, and time  Skin: Skin is warm and dry  Capillary refill takes less than 2 seconds  Psychiatric: She has a normal mood and affect  Her behavior is normal    Nursing note and vitals reviewed  Vital Signs  ED Triage Vitals [01/04/20 1221]   Temperature Pulse Respirations Blood Pressure SpO2   98 2 °F (36 8 °C) 97 20 153/82 97 %      Temp Source Heart Rate Source Patient Position - Orthostatic VS BP Location FiO2 (%)   Tympanic Monitor -- -- --      Pain Score       --           Vitals:    01/04/20 1221   BP: 153/82   Pulse: 97         Visual Acuity      ED Medications  Medications - No data to display    Diagnostic Studies  Results Reviewed     None                 No orders to display              Procedures  Procedures         ED Course                               MDM  Number of Diagnoses or Management Options  Diagnosis management comments: Upper abdominal pain radiating to the flank associated with dark urine may represent either gallstones or kidney stones  Will CT scan the abdomen        Disposition  Final diagnoses:   None     ED Disposition     None      Follow-up Information    None         Patient's Medications   Discharge Prescriptions    No medications on file     No discharge procedures on file      ED Provider  Electronically Signed by           Katelynn Wilson MD  01/04/20 0965

## 2020-01-06 ENCOUNTER — TRANSCRIBE ORDERS (OUTPATIENT)
Dept: ADMINISTRATIVE | Facility: HOSPITAL | Age: 55
End: 2020-01-06

## 2020-01-06 ENCOUNTER — HOSPITAL ENCOUNTER (OUTPATIENT)
Dept: RADIOLOGY | Facility: HOSPITAL | Age: 55
Discharge: HOME | End: 2020-01-06
Attending: EMERGENCY MEDICINE
Payer: COMMERCIAL

## 2020-01-06 DIAGNOSIS — K80.50 BILIARY COLIC SYMPTOM: ICD-10-CM

## 2020-01-06 PROCEDURE — 76705 ECHO EXAM OF ABDOMEN: CPT

## 2020-01-07 ENCOUNTER — OFFICE VISIT (OUTPATIENT)
Dept: FAMILY MEDICINE CLINIC | Facility: CLINIC | Age: 55
End: 2020-01-07
Payer: COMMERCIAL

## 2020-01-07 VITALS
BODY MASS INDEX: 34.96 KG/M2 | HEART RATE: 88 BPM | SYSTOLIC BLOOD PRESSURE: 142 MMHG | RESPIRATION RATE: 18 BRPM | DIASTOLIC BLOOD PRESSURE: 80 MMHG | WEIGHT: 190 LBS | OXYGEN SATURATION: 96 % | HEIGHT: 62 IN | TEMPERATURE: 99.1 F

## 2020-01-07 DIAGNOSIS — R10.11 RIGHT UPPER QUADRANT PAIN: ICD-10-CM

## 2020-01-07 DIAGNOSIS — K76.0 FATTY LIVER: ICD-10-CM

## 2020-01-07 DIAGNOSIS — I10 ESSENTIAL HYPERTENSION: Primary | ICD-10-CM

## 2020-01-07 PROCEDURE — 3008F BODY MASS INDEX DOCD: CPT | Performed by: FAMILY MEDICINE

## 2020-01-07 PROCEDURE — 99214 OFFICE O/P EST MOD 30 MIN: CPT | Performed by: FAMILY MEDICINE

## 2020-01-07 PROCEDURE — 3725F SCREEN DEPRESSION PERFORMED: CPT | Performed by: FAMILY MEDICINE

## 2020-01-07 NOTE — PROGRESS NOTES
Assessment/Plan:    1  Essential hypertension  Assessment & Plan:  Elevated today due to pain       2  Right upper quadrant pain  Comments:  Continues to have pain, reviewed ultrasound done yesterday and recent CT scan  Hepatobiliary scan ordered  Orders:  -     NM hepatobiliary w rx; Future; Expected date: 01/07/2020    3  Fatty liver  Assessment & Plan:  New  Weight loss advised   Low sugar diet advised            There are no Patient Instructions on file for this visit  Return in about 3 months (around 4/7/2020) for Annual physical     Subjective:      Patient ID: Herberth Aceves is a 47 y o  female  Chief Complaint   Patient presents with    Abdominal Pain     was seen in ER  [facility]       She was having pain in her right upper abdomen and right back  The pain started on New Years  She vomited for a few days  She went to the emergency room and is still having the pain  She was given a prescription for vicodin for the pain  The following portions of the patient's history were reviewed and updated as appropriate:  past social history    Review of Systems   Constitutional: Negative for fever  Gastrointestinal: Positive for abdominal pain and nausea  Current Outpatient Medications   Medication Sig Dispense Refill    HYDROcodone-acetaminophen (NORCO) 5-325 mg per tablet Take 1 tablet by mouth every 6 (six) hours as needed for pain for up to 7 daysMax Daily Amount: 4 tablets 20 tablet 0    albuterol (PROVENTIL HFA,VENTOLIN HFA) 90 mcg/act inhaler Inhale 2 puffs every 4 (four) hours as needed for wheezing or shortness of breath (Patient not taking: Reported on 1/7/2020) 1 Inhaler 5    gabapentin (NEURONTIN) 300 mg capsule Take 1 capsule (300 mg total) by mouth 3 (three) times a day (Patient not taking: Reported on 1/7/2020) 90 capsule 1     No current facility-administered medications for this visit          Objective:    /80   Pulse 88   Temp 99 1 °F (37 3 °C)   Resp 18   Ht 5' 2" (1 575 m)   Wt 86 2 kg (190 lb)   SpO2 96%   BMI 34 75 kg/m²      Physical Exam   Constitutional: She appears well-developed and well-nourished  HENT:   Head: Normocephalic and atraumatic  Right Ear: External ear normal    Left Ear: External ear normal    Mouth/Throat: Oropharynx is clear and moist    Cardiovascular: Normal rate, regular rhythm and normal heart sounds  Exam reveals no friction rub  No murmur heard  Pulmonary/Chest: Effort normal and breath sounds normal  No respiratory distress  She has no wheezes  She has no rales  Abdominal: Soft  Normal appearance  There is tenderness in the right upper quadrant  Musculoskeletal: She exhibits no edema or deformity  Nursing note and vitals reviewed            Dar Farooq DO

## 2020-01-10 DIAGNOSIS — K80.50 BILIARY COLIC SYMPTOM: ICD-10-CM

## 2020-01-10 RX ORDER — HYDROCODONE BITARTRATE AND ACETAMINOPHEN 5; 325 MG/1; MG/1
1 TABLET ORAL EVERY 6 HOURS PRN
Qty: 20 TABLET | Refills: 0 | Status: CANCELLED | OUTPATIENT
Start: 2020-01-10 | End: 2020-01-17

## 2020-01-10 RX ORDER — IBUPROFEN 800 MG/1
800 TABLET ORAL EVERY 6 HOURS PRN
Qty: 30 TABLET | Refills: 1 | Status: SHIPPED | OUTPATIENT
Start: 2020-01-10 | End: 2021-07-14 | Stop reason: SDUPTHER

## 2020-01-10 NOTE — TELEPHONE ENCOUNTER
1/10/2020 4:21 PM called Darya to see why she is needing the refill  Her insurance is giving her a hard time about getting the nuclear study  Her pain level is at a 6 out of 10      I advised that I didn't want to continue her on vicodin  Will start ibuprofen and recommended follow up with BEN Garcia, DO

## 2020-01-31 ENCOUNTER — HOSPITAL ENCOUNTER (OUTPATIENT)
Dept: NEUROLOGY | Facility: HOSPITAL | Age: 55
Discharge: HOME | End: 2020-01-31
Attending: PODIATRIST
Payer: COMMERCIAL

## 2020-01-31 DIAGNOSIS — G57.52 TARSAL TUNNEL SYNDROME OF LEFT SIDE: ICD-10-CM

## 2020-01-31 DIAGNOSIS — M77.42 METATARSALGIA OF BOTH FEET: ICD-10-CM

## 2020-01-31 DIAGNOSIS — M54.16 RADICULOPATHY OF LUMBAR REGION: ICD-10-CM

## 2020-01-31 DIAGNOSIS — M77.41 METATARSALGIA OF BOTH FEET: ICD-10-CM

## 2020-01-31 PROBLEM — G89.29 CHRONIC PAIN OF BOTH FEET: Status: ACTIVE | Noted: 2018-10-10

## 2020-01-31 PROCEDURE — 95911 NRV CNDJ TEST 9-10 STUDIES: CPT | Performed by: PSYCHIATRY & NEUROLOGY

## 2020-01-31 PROCEDURE — 95886 MUSC TEST DONE W/N TEST COMP: CPT | Performed by: PSYCHIATRY & NEUROLOGY

## 2020-02-05 ENCOUNTER — CONSULT (OUTPATIENT)
Dept: GASTROENTEROLOGY | Facility: CLINIC | Age: 55
End: 2020-02-05
Payer: COMMERCIAL

## 2020-02-05 VITALS
BODY MASS INDEX: 35.24 KG/M2 | DIASTOLIC BLOOD PRESSURE: 72 MMHG | HEIGHT: 62 IN | TEMPERATURE: 99.5 F | WEIGHT: 191.5 LBS | SYSTOLIC BLOOD PRESSURE: 136 MMHG | HEART RATE: 96 BPM

## 2020-02-05 DIAGNOSIS — Z12.11 COLON CANCER SCREENING: ICD-10-CM

## 2020-02-05 DIAGNOSIS — K76.0 HEPATIC STEATOSIS: ICD-10-CM

## 2020-02-05 DIAGNOSIS — R10.13 EPIGASTRIC PAIN: Primary | ICD-10-CM

## 2020-02-05 DIAGNOSIS — K80.50 BILIARY COLIC SYMPTOM: ICD-10-CM

## 2020-02-05 PROCEDURE — 3008F BODY MASS INDEX DOCD: CPT | Performed by: NURSE PRACTITIONER

## 2020-02-05 PROCEDURE — 99244 OFF/OP CNSLTJ NEW/EST MOD 40: CPT | Performed by: INTERNAL MEDICINE

## 2020-02-05 RX ORDER — PANTOPRAZOLE SODIUM 40 MG/1
40 TABLET, DELAYED RELEASE ORAL DAILY
Qty: 90 TABLET | Refills: 0 | Status: SHIPPED | OUTPATIENT
Start: 2020-02-05 | End: 2022-02-23

## 2020-02-05 NOTE — PROGRESS NOTES
Consultation - [facility]  Jesse Daniel 47 y o  female MRN: [id number]  Unit/Bed#:  Encounter: [id number]        Consults    ASSESSMENT/PLAN:       1  Epigastric/right upper quadrant abdominal pain-likely secondary to biliary dyskinesia versus peptic ulcer disease  Symptoms are aggravated by fatty food intake   -will start on pantoprazole 40 mg on daily basis to be taken 30 minutes before breakfast   -patient does report significant alcohol use, would recommend to avoid this altogether   -avoid the use of NSAIDs, patient reports that she takes ibuprofen on as needed basis for joint aches   -will schedule for EGD to assess for peptic ulcer disease and to assess for gastritis  Patient was explained about the lifestyle and dietary modifications  Advised to avoid fatty foods, chocolates, caffeine, alcohol and any other triggering foods  Avoid eating for at least 3 hours before going to bed   -if she does not have improvement with PPI and EGD is unremarkable, would recommend HIDA scan for further evaluation  2  Diarrhea-resolved  If she has recurrent symptoms, would recommend stool studies to assess for C diff, stool ova and parasite and 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  -stay hydrated  Patient is to call us if she has any recurrent symptoms of diarrhea  3  Colon cancer screening-average risk, no previous colonoscopy, no family history of colon cancer  No hematochezia  -will schedule average risk screening colonoscopy at this time  Patient was explained about  the risks and benefits of the procedure  Risks including but not limited to bleeding, infection, perforation were explained in detail  Also explained about less than 100% sensitivity with the exam and other alternatives  4  Hepatic steatosis-patient is morbidly obese and also endorses alcohol use regularly  Fortunately, her transaminases are normal, no evidence of cirrhosis on imaging  Ultrasound shows severe hepatic steatosis  I had a lengthy discussion with patient that she needs to work on weight loss and avoid all alcoholic beverages  Would recommend avoiding all hepatotoxic medications   -continue to monitor LFTs periodically  ______________________________________________________________________    Reason for Consult / Principal Problem: [unfilled]    HPI: Marlys Lozano is a 47y o  year old female with history of fatty liver disease, hypertension, presents for evaluation of epigastric/ right upper quadrant abdominal pain  Patient reports that the pain began after a fatty meal new year's Day  She underwent right upper quadrant ultrasound which showed adenomyomatosis but no evidence of cholelithiasis a, no intrahepatic biliary dilatation  There was also evidence of severe hepatic steatosis, fortunately, liver function tests including transaminases were normal   Platelets are normal   Patient was discharged home with Vicodin which she states helped with symptomatology but symptoms have not fully resolved  She states that the pain is mostly in the epigastric/right upper quadrant area  She has avoided fatty meals but has noted that when she does have dietary indiscretions, the pain returns  For the past week and a half, she has also has symptoms of diarrhea which subsided yesterday  She states that she has had normal bowel movement today  Denies any sick contacts, no recent antibiotic use  She does work at a nursing home  She has never had a colonoscopy  Denies history of peptic ulcer disease  Denies dysphagia, hematemesis, coffee-ground emesis or melena  Review of Systems: The remainder of the review of systems was negative except for the pertinent positives noted in HPI       Historical Information   Past Medical History:   Diagnosis Date    Apnea 01/22/2009    Arthralgia     last assessed 6/28/13    Depression 10/09/2006    Dysmenorrhea 01/22/2009    Essential hypertension 3/8/2018    Exposure to potentially hazardous body fluids     last assessed 16    Insomnia 10/09/2006    Kidney stone     20 years ago    Muscle weakness (generalized) 2006    Obesity     Viral gastroenteritis     last assessed 13     Past Surgical History:   Procedure Laterality Date    APPENDECTOMY      last assessed 17    PLANTAR FASCIA SURGERY Right 3/30/2018    Procedure: Resection heel spur;  Surgeon: Gertie Boas, DPM;  Location: [facility];  Service: Podiatry    PLANTAR FASCIA SURGERY Left 2018    Procedure: RELEASE FASCIA PLANTAR/FASCIOTOMY;  Surgeon: Gertie Boas, DPM;  Location: [address] [place] [address];  Service: Podiatry    ID REMOVAL OF HEEL SPUR  2018    Procedure: Heel spur resection with partial plantar fasciectomy;  Surgeon: Gertie Boas, DPM;  Location: [facility];  Service: Podiatry    SALPINGECTOMY      had a tubal pregnancy    SALPINGOSTOMY      TONSILLECTOMY       Social History   Social History     Substance and Sexual Activity   Alcohol Use Yes    Comment: OCCASIONALLY      Social History     Substance and Sexual Activity   Drug Use No     Social History     Tobacco Use   Smoking Status Current Every Day Smoker    Packs/day: 0 50    Years: 30 00    Pack years: 15 00    Last attempt to quit: 3/8/2014    Years since quittin 9   Smokeless Tobacco Never Used     Family History   Problem Relation Age of Onset    Diabetes Mother     Fibromyalgia Mother     Hypertension Father     Hyperthyroidism Father     Hypothyroidism Father     Stroke Maternal Grandfather     Mental illness Neg Hx        Meds/Allergies       (Not in a hospital admission)  No current facility-administered medications for this visit  No Known Allergies    Objective     Blood pressure 136/72, pulse 96, temperature 99 5 °F (37 5 °C), height 5' 2" (1 575 m), weight 86 9 kg (191 lb 8 oz)      [unfilled]    PHYSICAL EXAM     GEN: well nourished, well developed, no acute distress  HEENT: anicteric, MMM, no cervical or supraclavicular lymphadenopathy  CV: RRR, no m/r/g  CHEST: CTA b/l, no WRR  ABD: +BS, soft, NT/ND, no hepatosplenomegaly  EXT: no c/c/e  SKIN: no rashes,  NEURO: aaox3    Lab Results:   No visits with results within 1 Day(s) from this visit     Latest known visit with results is:   Admission on 01/04/2020, Discharged on 01/04/2020   Component Date Value    WBC 01/04/2020 10 44*    RBC 01/04/2020 5 48*    Hemoglobin 01/04/2020 17 2*    Hematocrit 01/04/2020 50 1*    MCV 01/04/2020 91     MCH 01/04/2020 31 4     MCHC 01/04/2020 34 3     RDW 01/04/2020 12 7     MPV 01/04/2020 9 2     Platelets 12/13/0410 327     nRBC 01/04/2020 0     Neutrophils Relative 01/04/2020 68     Immat GRANS % 01/04/2020 1     Lymphocytes Relative 01/04/2020 19     Monocytes Relative 01/04/2020 9     Eosinophils Relative 01/04/2020 2     Basophils Relative 01/04/2020 1     Neutrophils Absolute 01/04/2020 7 18     Immature Grans Absolute 01/04/2020 0 05     Lymphocytes Absolute 01/04/2020 1 99     Monocytes Absolute 01/04/2020 0 94     Eosinophils Absolute 01/04/2020 0 19     Basophils Absolute 01/04/2020 0 09     Sodium 01/04/2020 138     Potassium 01/04/2020 4 0     Chloride 01/04/2020 103     CO2 01/04/2020 27     ANION GAP 01/04/2020 8     BUN 01/04/2020 15     Creatinine 01/04/2020 0 89     Glucose 01/04/2020 94     Calcium 01/04/2020 8 6     AST 01/04/2020 22     ALT 01/04/2020 27     Alkaline Phosphatase 01/04/2020 90     Total Protein 01/04/2020 7 7     Albumin 01/04/2020 3 5     Total Bilirubin 01/04/2020 0 60     eGFR 01/04/2020 74     Color, UA 01/04/2020 Yellow     Clarity, UA 01/04/2020 Cloudy     Specific Gravity, UA 01/04/2020 1 025     pH, UA 01/04/2020 6 0     Leukocytes, UA 01/04/2020 Small*    Nitrite, UA 01/04/2020 Negative     Protein, UA 01/04/2020 100 (2+)*    Glucose, UA 01/04/2020 Negative     Ketones, UA 01/04/2020 Negative  Urobilinogen, UA 01/04/2020 1 0     Bilirubin, UA 01/04/2020 Interference- unable to analyze*    Blood, UA 01/04/2020 Moderate*    Lipase 01/04/2020 95     RBC, UA 01/04/2020 None Seen     WBC, UA 01/04/2020 4-10*    Epithelial Cells 01/04/2020 Moderate*    Bacteria, UA 01/04/2020 Innumerable*    Ca Oxalate Carol, UA 01/04/2020 Moderate*     Imaging Studies: I have personally reviewed pertinent films in PACS

## 2020-02-05 NOTE — LETTER
February 5, 2020     Sally Taylor, DO  [address] [facility] [place] [address]    Patient: Faviola Zelaya   YOB: 1965   Date of Visit: 2/5/2020       Dear Dr Jenny Silver: Thank you for referring Faviola Zelaya to me for evaluation  Below are my notes for this consultation  If you have questions, please do not hesitate to call me  I look forward to following your patient along with you  Sincerely,        Ronda Blanchard MD        CC: No Recipients  Ronda Blanchard MD  2/5/2020  4:45 PM  Sign at close encounter  Consultation - [facility]  Faviola Zelaya 47 y o  female MRN: [id number]  Unit/Bed#:  Encounter: [id number]        Consults    ASSESSMENT/PLAN:       1  Epigastric/right upper quadrant abdominal pain-likely secondary to biliary dyskinesia versus peptic ulcer disease  Symptoms are aggravated by fatty food intake   -will start on pantoprazole 40 mg on daily basis to be taken 30 minutes before breakfast   -patient does report significant alcohol use, would recommend to avoid this altogether   -avoid the use of NSAIDs, patient reports that she takes ibuprofen on as needed basis for joint aches   -will schedule for EGD to assess for peptic ulcer disease and to assess for gastritis  Patient was explained about the lifestyle and dietary modifications  Advised to avoid fatty foods, chocolates, caffeine, alcohol and any other triggering foods  Avoid eating for at least 3 hours before going to bed   -if she does not have improvement with PPI and EGD is unremarkable, would recommend HIDA scan for further evaluation  2  Diarrhea-resolved  If she has recurrent symptoms, would recommend stool studies to assess for C diff, stool ova and parasite and 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  -stay hydrated  Patient is to call us if she has any recurrent symptoms of diarrhea      3  Colon cancer screening-average risk, no previous colonoscopy, no family history of colon cancer  No hematochezia  -will schedule average risk screening colonoscopy at this time  Patient was explained about  the risks and benefits of the procedure  Risks including but not limited to bleeding, infection, perforation were explained in detail  Also explained about less than 100% sensitivity with the exam and other alternatives  4  Hepatic steatosis-patient is morbidly obese and also endorses alcohol use regularly  Fortunately, her transaminases are normal, no evidence of cirrhosis on imaging  Ultrasound shows severe hepatic steatosis  I had a lengthy discussion with patient that she needs to work on weight loss and avoid all alcoholic beverages  Would recommend avoiding all hepatotoxic medications   -continue to monitor LFTs periodically  ______________________________________________________________________    Reason for Consult / Principal Problem: [unfilled]    HPI: Yomaira Mejia is a 47y o  year old female with history of fatty liver disease, hypertension, presents for evaluation of epigastric/ right upper quadrant abdominal pain  Patient reports that the pain began after a fatty meal new year's Day  She underwent right upper quadrant ultrasound which showed adenomyomatosis but no evidence of cholelithiasis a, no intrahepatic biliary dilatation  There was also evidence of severe hepatic steatosis, fortunately, liver function tests including transaminases were normal   Platelets are normal   Patient was discharged home with Vicodin which she states helped with symptomatology but symptoms have not fully resolved  She states that the pain is mostly in the epigastric/right upper quadrant area  She has avoided fatty meals but has noted that when she does have dietary indiscretions, the pain returns  For the past week and a half, she has also has symptoms of diarrhea which subsided yesterday  She states that she has had normal bowel movement today    Denies any sick contacts, no recent antibiotic use  She does work at a nursing home  She has never had a colonoscopy  Denies history of peptic ulcer disease  Denies dysphagia, hematemesis, coffee-ground emesis or melena  Review of Systems: The remainder of the review of systems was negative except for the pertinent positives noted in HPI       Historical Information   Past Medical History:   Diagnosis Date    Apnea 2009    Arthralgia     last assessed 13    Depression 10/09/2006    Dysmenorrhea 2009    Essential hypertension 3/8/2018    Exposure to potentially hazardous body fluids     last assessed 16    Insomnia 10/09/2006    Kidney stone     20 years ago    Muscle weakness (generalized) 2006    Obesity     Viral gastroenteritis     last assessed 13     Past Surgical History:   Procedure Laterality Date    APPENDECTOMY      last assessed 17    PLANTAR FASCIA SURGERY Right 3/30/2018    Procedure: Resection heel spur;  Surgeon: Edgardo Abreu DPM;  Location: [address] [place] [address];  Service: Podiatry    PLANTAR FASCIA SURGERY Left 2018    Procedure: RELEASE FASCIA PLANTAR/FASCIOTOMY;  Surgeon: Edgardo Abreu DPM;  Location: [address] [place] [address];  Service: Podiatry    [place] REMOVAL OF HEEL SPUR  2018    Procedure: Heel spur resection with partial plantar fasciectomy;  Surgeon: Edgardo Abreu DPM;  Location: [facility];  Service: Podiatry    SALPINGECTOMY      had a tubal pregnancy    SALPINGOSTOMY      TONSILLECTOMY       Social History   Social History     Substance and Sexual Activity   Alcohol Use Yes    Comment: OCCASIONALLY      Social History     Substance and Sexual Activity   Drug Use No     Social History     Tobacco Use   Smoking Status Current Every Day Smoker    Packs/day: 0 50    Years: 30 00    Pack years: 15 00    Last attempt to quit: 3/8/2014    Years since quittin 9   Smokeless Tobacco Never Used     Family History   Problem Relation Age of Onset    Diabetes Mother     Fibromyalgia Mother     Hypertension Father     Hyperthyroidism Father     Hypothyroidism Father     Stroke Maternal Grandfather     Mental illness Neg Hx        Meds/Allergies       (Not in a hospital admission)  No current facility-administered medications for this visit  No Known Allergies    Objective     Blood pressure 136/72, pulse 96, temperature 99 5 °F (37 5 °C), height 5' 2" (1 575 m), weight 86 9 kg (191 lb 8 oz)  [unfilled]    PHYSICAL EXAM     GEN: well nourished, well developed, no acute distress  HEENT: anicteric, MMM, no cervical or supraclavicular lymphadenopathy  CV: RRR, no m/r/g  CHEST: CTA b/l, no WRR  ABD: +BS, soft, NT/ND, no hepatosplenomegaly  EXT: no c/c/e  SKIN: no rashes,  NEURO: aaox3    Lab Results:   No visits with results within 1 Day(s) from this visit     Latest known visit with results is:   Admission on 01/04/2020, Discharged on 01/04/2020   Component Date Value    WBC 01/04/2020 10 44*    RBC 01/04/2020 5 48*    Hemoglobin 01/04/2020 17 2*    Hematocrit 01/04/2020 50 1*    MCV 01/04/2020 91     MCH 01/04/2020 31 4     MCHC 01/04/2020 34 3     RDW 01/04/2020 12 7     MPV 01/04/2020 9 2     Platelets 69/89/0168 327     nRBC 01/04/2020 0     Neutrophils Relative 01/04/2020 68     Immat GRANS % 01/04/2020 1     Lymphocytes Relative 01/04/2020 19     Monocytes Relative 01/04/2020 9     Eosinophils Relative 01/04/2020 2     Basophils Relative 01/04/2020 1     Neutrophils Absolute 01/04/2020 7 18     Immature Grans Absolute 01/04/2020 0 05     Lymphocytes Absolute 01/04/2020 1 99     Monocytes Absolute 01/04/2020 0 94     Eosinophils Absolute 01/04/2020 0 19     Basophils Absolute 01/04/2020 0 09     Sodium 01/04/2020 138     Potassium 01/04/2020 4 0     Chloride 01/04/2020 103     CO2 01/04/2020 27     ANION GAP 01/04/2020 8     BUN 01/04/2020 15     Creatinine 01/04/2020 0 89     Glucose 01/04/2020 94     Calcium 01/04/2020 8 6  AST 01/04/2020 22     ALT 01/04/2020 27     Alkaline Phosphatase 01/04/2020 90     Total Protein 01/04/2020 7 7     Albumin 01/04/2020 3 5     Total Bilirubin 01/04/2020 0 60     eGFR 01/04/2020 74     Color, UA 01/04/2020 Yellow     Clarity, UA 01/04/2020 Cloudy     Specific Gravity, UA 01/04/2020 1 025     pH, UA 01/04/2020 6 0     Leukocytes, UA 01/04/2020 Small*    Nitrite, UA 01/04/2020 Negative     Protein, UA 01/04/2020 100 (2+)*    Glucose, UA 01/04/2020 Negative     Ketones, UA 01/04/2020 Negative     Urobilinogen, UA 01/04/2020 1 0     Bilirubin, UA 01/04/2020 Interference- unable to analyze*    Blood, UA 01/04/2020 Moderate*    Lipase 01/04/2020 95     RBC, UA 01/04/2020 None Seen     WBC, UA 01/04/2020 4-10*    Epithelial Cells 01/04/2020 Moderate*    Bacteria, UA 01/04/2020 Innumerable*    Ca Oxalate Carol, UA 01/04/2020 Moderate*     Imaging Studies: I have personally reviewed pertinent films in PACS

## 2020-02-05 NOTE — H&P (VIEW-ONLY)
Consultation - [facility]  Patti Hernandez 47 y o  female MRN: [id number]  Unit/Bed#:  Encounter: [id number]        Consults    ASSESSMENT/PLAN:       1  Epigastric/right upper quadrant abdominal pain-likely secondary to biliary dyskinesia versus peptic ulcer disease  Symptoms are aggravated by fatty food intake   -will start on pantoprazole 40 mg on daily basis to be taken 30 minutes before breakfast   -patient does report significant alcohol use, would recommend to avoid this altogether   -avoid the use of NSAIDs, patient reports that she takes ibuprofen on as needed basis for joint aches   -will schedule for EGD to assess for peptic ulcer disease and to assess for gastritis  Patient was explained about the lifestyle and dietary modifications  Advised to avoid fatty foods, chocolates, caffeine, alcohol and any other triggering foods  Avoid eating for at least 3 hours before going to bed   -if she does not have improvement with PPI and EGD is unremarkable, would recommend HIDA scan for further evaluation  2  Diarrhea-resolved  If she has recurrent symptoms, would recommend stool studies to assess for C diff, stool ova and parasite and 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  -stay hydrated  Patient is to call us if she has any recurrent symptoms of diarrhea  3  Colon cancer screening-average risk, no previous colonoscopy, no family history of colon cancer  No hematochezia  -will schedule average risk screening colonoscopy at this time  Patient was explained about  the risks and benefits of the procedure  Risks including but not limited to bleeding, infection, perforation were explained in detail  Also explained about less than 100% sensitivity with the exam and other alternatives  4  Hepatic steatosis-patient is morbidly obese and also endorses alcohol use regularly  Fortunately, her transaminases are normal, no evidence of cirrhosis on imaging  Ultrasound shows severe hepatic steatosis  I had a lengthy discussion with patient that she needs to work on weight loss and avoid all alcoholic beverages  Would recommend avoiding all hepatotoxic medications   -continue to monitor LFTs periodically  ______________________________________________________________________    Reason for Consult / Principal Problem: [unfilled]    HPI: Yomaira Mejia is a 47y o  year old female with history of fatty liver disease, hypertension, presents for evaluation of epigastric/ right upper quadrant abdominal pain  Patient reports that the pain began after a fatty meal new year's Day  She underwent right upper quadrant ultrasound which showed adenomyomatosis but no evidence of cholelithiasis a, no intrahepatic biliary dilatation  There was also evidence of severe hepatic steatosis, fortunately, liver function tests including transaminases were normal   Platelets are normal   Patient was discharged home with Vicodin which she states helped with symptomatology but symptoms have not fully resolved  She states that the pain is mostly in the epigastric/right upper quadrant area  She has avoided fatty meals but has noted that when she does have dietary indiscretions, the pain returns  For the past week and a half, she has also has symptoms of diarrhea which subsided yesterday  She states that she has had normal bowel movement today  Denies any sick contacts, no recent antibiotic use  She does work at a nursing home  She has never had a colonoscopy  Denies history of peptic ulcer disease  Denies dysphagia, hematemesis, coffee-ground emesis or melena  Review of Systems: The remainder of the review of systems was negative except for the pertinent positives noted in HPI       Historical Information   Past Medical History:   Diagnosis Date    Apnea 01/22/2009    Arthralgia     last assessed 6/28/13    Depression 10/09/2006    Dysmenorrhea 01/22/2009    Essential hypertension 3/8/2018    Exposure to potentially hazardous body fluids     last assessed 16    Insomnia 10/09/2006    Kidney stone     20 years ago    Muscle weakness (generalized) 2006    Obesity     Viral gastroenteritis     last assessed 13     Past Surgical History:   Procedure Laterality Date    APPENDECTOMY      last assessed 17    PLANTAR FASCIA SURGERY Right 3/30/2018    Procedure: Resection heel spur;  Surgeon: William Vasquez DPM;  [facility];  Service: Podiatry    PLANTAR FASCIA SURGERY Left 2018    Procedure: RELEASE FASCIA PLANTAR/FASCIOTOMY;  Surgeon: William Vasquez DPM;  Location: [address] [place] [address];  Service: Podiatry    [place] REMOVAL OF [place] SPUR  2018    Procedure: Heel spur resection with partial plantar fasciectomy;  Surgeon: William Vasquez DPM;  [facility];  Service: Podiatry    SALPINGECTOMY      had a tubal pregnancy    SALPINGOSTOMY      TONSILLECTOMY       Social History   Social History     Substance and Sexual Activity   Alcohol Use Yes    Comment: OCCASIONALLY      Social History     Substance and Sexual Activity   Drug Use No     Social History     Tobacco Use   Smoking Status Current Every Day Smoker    Packs/day: 0 50    Years: 30 00    Pack years: 15 00    Last attempt to quit: 3/8/2014    Years since quittin 9   Smokeless Tobacco Never Used     Family History   Problem Relation Age of Onset    Diabetes Mother     Fibromyalgia Mother     Hypertension Father     Hyperthyroidism Father     Hypothyroidism Father     Stroke Maternal Grandfather     Mental illness Neg Hx        Meds/Allergies       (Not in a hospital admission)  No current facility-administered medications for this visit  No Known Allergies    Objective     Blood pressure 136/72, pulse 96, temperature 99 5 °F (37 5 °C), height 5' 2" (1 575 m), weight 86 9 kg (191 lb 8 oz)      [unfilled]    PHYSICAL EXAM     GEN: well nourished, well developed, no acute distress  HEENT: anicteric, MMM, no cervical or supraclavicular lymphadenopathy  CV: RRR, no m/r/g  CHEST: CTA b/l, no WRR  ABD: +BS, soft, NT/ND, no hepatosplenomegaly  EXT: no c/c/e  SKIN: no rashes,  NEURO: aaox3    Lab Results:   No visits with results within 1 Day(s) from this visit     Latest known visit with results is:   Admission on 01/04/2020, Discharged on 01/04/2020   Component Date Value    WBC 01/04/2020 10 44*    RBC 01/04/2020 5 48*    Hemoglobin 01/04/2020 17 2*    Hematocrit 01/04/2020 50 1*    MCV 01/04/2020 91     MCH 01/04/2020 31 4     MCHC 01/04/2020 34 3     RDW 01/04/2020 12 7     MPV 01/04/2020 9 2     Platelets 47/27/7365 327     nRBC 01/04/2020 0     Neutrophils Relative 01/04/2020 68     Immat GRANS % 01/04/2020 1     Lymphocytes Relative 01/04/2020 19     Monocytes Relative 01/04/2020 9     Eosinophils Relative 01/04/2020 2     Basophils Relative 01/04/2020 1     Neutrophils Absolute 01/04/2020 7 18     Immature Grans Absolute 01/04/2020 0 05     Lymphocytes Absolute 01/04/2020 1 99     Monocytes Absolute 01/04/2020 0 94     Eosinophils Absolute 01/04/2020 0 19     Basophils Absolute 01/04/2020 0 09     Sodium 01/04/2020 138     Potassium 01/04/2020 4 0     Chloride 01/04/2020 103     CO2 01/04/2020 27     ANION GAP 01/04/2020 8     BUN 01/04/2020 15     Creatinine 01/04/2020 0 89     Glucose 01/04/2020 94     Calcium 01/04/2020 8 6     AST 01/04/2020 22     ALT 01/04/2020 27     Alkaline Phosphatase 01/04/2020 90     Total Protein 01/04/2020 7 7     Albumin 01/04/2020 3 5     Total Bilirubin 01/04/2020 0 60     eGFR 01/04/2020 74     Color, UA 01/04/2020 Yellow     Clarity, UA 01/04/2020 Cloudy     Specific Gravity, UA 01/04/2020 1 025     pH, UA 01/04/2020 6 0     Leukocytes, UA 01/04/2020 Small*    Nitrite, UA 01/04/2020 Negative     Protein, UA 01/04/2020 100 (2+)*    Glucose, UA 01/04/2020 Negative     Ketones, UA 01/04/2020 Negative  Urobilinogen, UA 01/04/2020 1 0     Bilirubin, UA 01/04/2020 Interference- unable to analyze*    Blood, UA 01/04/2020 Moderate*    Lipase 01/04/2020 95     RBC, UA 01/04/2020 None Seen     WBC, UA 01/04/2020 4-10*    Epithelial Cells 01/04/2020 Moderate*    Bacteria, UA 01/04/2020 Innumerable*    Ca Oxalate Carol, UA 01/04/2020 Moderate*     Imaging Studies: I have personally reviewed pertinent films in PACS

## 2020-02-12 ENCOUNTER — TELEPHONE (OUTPATIENT)
Dept: PODIATRY | Facility: CLINIC | Age: 55
End: 2020-02-12

## 2020-02-20 ENCOUNTER — OFFICE VISIT (OUTPATIENT)
Dept: PODIATRY | Facility: CLINIC | Age: 55
End: 2020-02-20
Payer: COMMERCIAL

## 2020-02-20 VITALS
SYSTOLIC BLOOD PRESSURE: 138 MMHG | BODY MASS INDEX: 35.15 KG/M2 | HEIGHT: 62 IN | WEIGHT: 191 LBS | DIASTOLIC BLOOD PRESSURE: 77 MMHG

## 2020-02-20 DIAGNOSIS — M79.671 PAIN IN BOTH FEET: ICD-10-CM

## 2020-02-20 DIAGNOSIS — G57.51 TARSAL TUNNEL SYNDROME, RIGHT: Primary | ICD-10-CM

## 2020-02-20 DIAGNOSIS — M79.672 PAIN IN BOTH FEET: ICD-10-CM

## 2020-02-20 DIAGNOSIS — G57.52 TARSAL TUNNEL SYNDROME OF LEFT SIDE: ICD-10-CM

## 2020-02-20 PROCEDURE — 64450 NJX AA&/STRD OTHER PN/BRANCH: CPT | Performed by: PODIATRIST

## 2020-02-20 RX ORDER — GABAPENTIN 600 MG/1
600 TABLET ORAL 2 TIMES DAILY
Qty: 60 TABLET | Refills: 0 | Status: SHIPPED | OUTPATIENT
Start: 2020-02-20 | End: 2020-03-21

## 2020-02-20 NOTE — PROGRESS NOTES
Assessment/Plan:  Bilateral tarsal tunnel syndrome  Patient has been given diagnosis of fibromyalgia as well  Plan  Bilateral nerve block performed  Into each tarsal tunnel, 1 cc Celestone with 2 cc 2% lidocaine plain injected without pain or complication  Will increase gabapentin dosing  Patient return for follow-up  Diagnoses and all orders for this visit:    Tarsal tunnel syndrome, right    Tarsal tunnel syndrome of left side    Pain in both feet          Subjective:  Patient is aware she has tarsal tunnel syndrome  This was has been proven by EMG  She has also been given diagnosis of fibromyalgia      No Known Allergies      Current Outpatient Medications:     albuterol (PROVENTIL HFA,VENTOLIN HFA) 90 mcg/act inhaler, Inhale 2 puffs every 4 (four) hours as needed for wheezing or shortness of breath, Disp: 1 Inhaler, Rfl: 5    gabapentin (NEURONTIN) 300 mg capsule, Take 1 capsule (300 mg total) by mouth 3 (three) times a day, Disp: 90 capsule, Rfl: 1    ibuprofen (MOTRIN) 800 mg tablet, Take 1 tablet (800 mg total) by mouth every 6 (six) hours as needed for moderate pain (take with food), Disp: 30 tablet, Rfl: 1    Na Sulfate-K Sulfate-Mg Sulf 17 5-3 13-1 6 GM/177ML SOLN, Take 1 applicator by mouth once for 1 dose, Disp: 1 Bottle, Rfl: 0    pantoprazole (PROTONIX) 40 mg tablet, Take 1 tablet (40 mg total) by mouth daily, Disp: 90 tablet, Rfl: 0    Patient Active Problem List   Diagnosis    Acquired deformity of right foot    Closed nondisplaced fracture of body of right calcaneus    Plantar fasciitis    Right foot pain    Left foot pain    Acquired ankle/foot deformity    Reduced libido    Elevated testosterone level in female    Hirsutism    Hypercholesterolemia    Impaired fasting glucose    Nicotine dependence    Pes planus, congenital    Plantar fibromatosis    Plantar fasciitis of right foot    Heel spur, right    Shortness of breath    LBBB (left bundle branch block)  Essential hypertension    Dyslipidemia    Sleep disturbance    Congenital pes planus    Difficulty walking    Plantar fascial fibromatosis    Bursitis of left foot    Heel spur, left    Generalized anxiety disorder    Radiculopathy of lumbar region    Acquired deformity of left foot    Metatarsalgia of both feet    Chronic pain of both feet    Severe obesity (BMI 35 0-39  9) with comorbidity ([address] )    Tailor's bunion of both feet    Fatty liver    Tarsal tunnel syndrome of left side          Patient ID: Ramesh Arriaga is a 47 y o  female  HPI    The following portions of the patient's history were reviewed and updated as appropriate:     family history includes Diabetes in her mother; Fibromyalgia in her mother; Hypertension in her father; Hyperthyroidism in her father; Hypothyroidism in her father; Stroke in her maternal grandfather  reports that she has been smoking  She has a 15 00 pack-year smoking history  She has never used smokeless tobacco  She reports that she drinks alcohol  She reports that she does not use drugs  Vitals:    02/20/20 1700   BP: 138/77       Review of Systems      Objective:  Patient's shoes and socks removed     Foot ExamPhysical Exam       Foot Exam     General  General Appearance: appears stated age and healthy   Orientation: alert and oriented to person, place, and time   Affect: appropriate   Gait: antalgic         Right Foot/Ankle      Inspection and Palpation  Ecchymosis: none  Tenderness: calcaneus tenderness, bony tenderness, lesser metatarsophalangeal joints and plantar fascia   Swelling: dorsum and metatarsals   Arch: pes planus  Hammertoes: fifth toe     Neurovascular  Dorsalis pedis: 3+  Posterior tibial: 3+  Superficial peroneal nerve sensation: diminished  Deep peroneal nerve sensation: diminished        Left Foot/Ankle       Inspection and Palpation  Ecchymosis: none  Tenderness: bony tenderness, lesser metatarsophalangeal joints, plantar fascia and calcaneus tenderness   Swelling: dorsum and metatarsals   Arch: pes planus  Hammertoes: fifth toe     Neurovascular  Dorsalis pedis: 3+  Posterior tibial: 3+  Superficial peroneal nerve sensation: diminished  Deep peroneal nerve sensation: diminished           Physical Exam   Constitutional: She appears well-developed and well-nourished  Cardiovascular: Normal rate and regular rhythm  Pulses:       Dorsalis pedis pulses are 3+ on the right side, and 3+ on the left side  Posterior tibial pulses are 3+ on the right side, and 3+ on the left side  Musculoskeletal: She exhibits edema, tenderness and deformity  Right foot: There is bony tenderness  Left foot: There is bony tenderness  Neurological:   Positive Tinel bilateral tibial nerve

## 2020-03-02 NOTE — PRE-PROCEDURE INSTRUCTIONS
Pre-Surgery Instructions:   Medication Instructions    albuterol (PROVENTIL HFA,VENTOLIN HFA) 90 mcg/act inhaler Instructed patient per Anesthesia Guidelines   gabapentin (NEURONTIN) 300 mg capsule Patient was instructed by Physician and understands   gabapentin (NEURONTIN) 600 MG tablet Patient was instructed by Physician and understands   ibuprofen (MOTRIN) 800 mg tablet Patient was instructed by Physician and understands   pantoprazole (PROTONIX) 40 mg tablet Patient was instructed by Physician and understands

## 2020-03-04 ENCOUNTER — OFFICE VISIT (OUTPATIENT)
Dept: PODIATRY | Facility: CLINIC | Age: 55
End: 2020-03-04
Payer: COMMERCIAL

## 2020-03-04 ENCOUNTER — ANESTHESIA EVENT (OUTPATIENT)
Dept: GASTROENTEROLOGY | Facility: AMBULARY SURGERY CENTER | Age: 55
End: 2020-03-04

## 2020-03-04 VITALS
SYSTOLIC BLOOD PRESSURE: 136 MMHG | WEIGHT: 191 LBS | DIASTOLIC BLOOD PRESSURE: 74 MMHG | BODY MASS INDEX: 35.15 KG/M2 | HEIGHT: 62 IN

## 2020-03-04 DIAGNOSIS — G57.52 TARSAL TUNNEL SYNDROME OF LEFT SIDE: ICD-10-CM

## 2020-03-04 DIAGNOSIS — G57.51 TARSAL TUNNEL SYNDROME, RIGHT: Primary | ICD-10-CM

## 2020-03-04 DIAGNOSIS — M77.41 METATARSALGIA OF BOTH FEET: ICD-10-CM

## 2020-03-04 DIAGNOSIS — M21.42 ACQUIRED FLAT FOOT, LEFT: ICD-10-CM

## 2020-03-04 DIAGNOSIS — M79.672 PAIN IN BOTH FEET: ICD-10-CM

## 2020-03-04 DIAGNOSIS — M77.42 METATARSALGIA OF BOTH FEET: ICD-10-CM

## 2020-03-04 DIAGNOSIS — M79.671 PAIN IN BOTH FEET: ICD-10-CM

## 2020-03-04 DIAGNOSIS — M21.41 ACQUIRED FLAT FOOT, RIGHT: ICD-10-CM

## 2020-03-04 PROCEDURE — 64450 NJX AA&/STRD OTHER PN/BRANCH: CPT | Performed by: PODIATRIST

## 2020-03-04 PROCEDURE — L3000 FT INSERT UCB BERKELEY SHELL: HCPCS | Performed by: PODIATRIST

## 2020-03-04 NOTE — PROGRESS NOTES
Assessment/Plan:  Bilateral tarsal tunnel syndrome  Patient has been given diagnosis of fibromyalgia as well      Plan  Bilateral nerve block performed  Into each tarsal tunnel, 1 cc Celestone with 2 cc 2% lidocaine plain injected without pain or complication  Will maintain gabapentin dosing  Patient return for follow-up  Patient's feet casted for custom molded foot orthotics            Diagnoses and all orders for this visit:     Tarsal tunnel syndrome, right     Tarsal tunnel syndrome of left side     Pain in both feet    Metatarsalgia  Acquired pes planus bilateral            Subjective:  Patient is aware she has tarsal tunnel syndrome  This was has been proven by EMG  She has also been given diagnosis of fibromyalgia    She believes that gabapentin and the injections are helping      No Known Allergies        Current Outpatient Medications:     albuterol (PROVENTIL HFA,VENTOLIN HFA) 90 mcg/act inhaler, Inhale 2 puffs every 4 (four) hours as needed for wheezing or shortness of breath, Disp: 1 Inhaler, Rfl: 5    gabapentin (NEURONTIN) 300 mg capsule, Take 1 capsule (300 mg total) by mouth 3 (three) times a day, Disp: 90 capsule, Rfl: 1    ibuprofen (MOTRIN) 800 mg tablet, Take 1 tablet (800 mg total) by mouth every 6 (six) hours as needed for moderate pain (take with food), Disp: 30 tablet, Rfl: 1    Na Sulfate-K Sulfate-Mg Sulf 17 5-3 13-1 6 GM/177ML SOLN, Take 1 applicator by mouth once for 1 dose, Disp: 1 Bottle, Rfl: 0    pantoprazole (PROTONIX) 40 mg tablet, Take 1 tablet (40 mg total) by mouth daily, Disp: 90 tablet, Rfl: 0     Patient Active Problem List   Diagnosis    Acquired deformity of right foot    Closed nondisplaced fracture of body of right calcaneus    Plantar fasciitis    Right foot pain    Left foot pain    Acquired ankle/foot deformity    Reduced libido    Elevated testosterone level in female    Hirsutism    Hypercholesterolemia    Impaired fasting glucose    Nicotine dependence    Pes planus, congenital    Plantar fibromatosis    Plantar fasciitis of right foot    Heel spur, right    Shortness of breath    LBBB (left bundle branch block)    Essential hypertension    Dyslipidemia    Sleep disturbance    Congenital pes planus    Difficulty walking    Plantar fascial fibromatosis    Bursitis of left foot    Heel spur, left    Generalized anxiety disorder    Radiculopathy of lumbar region    Acquired deformity of left foot    Metatarsalgia of both feet    Chronic pain of both feet    Severe obesity (BMI 35 0-39  9) with comorbidity ([address] )    Tailor's bunion of both feet    Fatty liver    Tarsal tunnel syndrome of left side             Patient ID: Colin Silva is a 47 y o  female      HPI     The following portions of the patient's history were reviewed and updated as appropriate:      family history includes Diabetes in her mother; Fibromyalgia in her mother; Hypertension in her father; Hyperthyroidism in her father; Hypothyroidism in her father; Stroke in her maternal grandfather        reports that she has been smoking  She has a 15 00 pack-year smoking history  She has never used smokeless tobacco  She reports that she drinks alcohol  She reports that she does not use drugs          Vitals:     02/20/20 1700   BP: 138/77         Review of Systems       Objective:  Patient's shoes and socks removed     Foot ExamPhysical Exam        Foot Exam     General  General Appearance: appears stated age and healthy   Orientation: alert and oriented to person, place, and time   Affect: appropriate   Gait: antalgic         Right Foot/Ankle      Inspection and Palpation  Ecchymosis: none  Tenderness: calcaneus tenderness, bony tenderness, lesser metatarsophalangeal joints and plantar fascia   Swelling: dorsum and metatarsals   Arch: pes planus  Hammertoes: fifth toe     Neurovascular  Dorsalis pedis: 3+  Posterior tibial: 3+  Superficial peroneal nerve sensation: diminished  Deep peroneal nerve sensation: diminished        Left Foot/Ankle       Inspection and Palpation  Ecchymosis: none  Tenderness: bony tenderness, lesser metatarsophalangeal joints, plantar fascia and calcaneus tenderness   Swelling: dorsum and metatarsals   Arch: pes planus  Hammertoes: fifth toe     Neurovascular  Dorsalis pedis: 3+  Posterior tibial: 3+  Superficial peroneal nerve sensation: diminished  Deep peroneal nerve sensation: diminished           Physical Exam   Constitutional: She appears well-developed and well-nourished  Cardiovascular: Normal rate and regular rhythm  Pulses:       Dorsalis pedis pulses are 3+ on the right side, and 3+ on the left side         Posterior tibial pulses are 3+ on the right side, and 3+ on the left side  Musculoskeletal: She exhibits edema, tenderness and deformity       Right foot: There is bony tenderness         Left foot: There is bony tenderness  Neurological:   Positive Tinel bilateral tibial nerve       Patient is pronated in stance and gait

## 2020-03-05 ENCOUNTER — ANESTHESIA (OUTPATIENT)
Dept: GASTROENTEROLOGY | Facility: AMBULARY SURGERY CENTER | Age: 55
End: 2020-03-05

## 2020-03-05 ENCOUNTER — HOSPITAL ENCOUNTER (OUTPATIENT)
Dept: GASTROENTEROLOGY | Facility: AMBULARY SURGERY CENTER | Age: 55
Setting detail: OUTPATIENT SURGERY
Discharge: HOME | End: 2020-03-05
Attending: INTERNAL MEDICINE | Admitting: INTERNAL MEDICINE
Payer: COMMERCIAL

## 2020-03-05 VITALS
RESPIRATION RATE: 16 BRPM | OXYGEN SATURATION: 95 % | HEART RATE: 72 BPM | HEIGHT: 62 IN | TEMPERATURE: 99 F | WEIGHT: 191 LBS | BODY MASS INDEX: 35.15 KG/M2 | DIASTOLIC BLOOD PRESSURE: 90 MMHG | SYSTOLIC BLOOD PRESSURE: 164 MMHG

## 2020-03-05 DIAGNOSIS — K76.0 HEPATIC STEATOSIS: ICD-10-CM

## 2020-03-05 DIAGNOSIS — R10.13 EPIGASTRIC PAIN: ICD-10-CM

## 2020-03-05 DIAGNOSIS — Z12.11 COLON CANCER SCREENING: ICD-10-CM

## 2020-03-05 DIAGNOSIS — K80.50 BILIARY COLIC SYMPTOM: ICD-10-CM

## 2020-03-05 PROCEDURE — 43239 EGD BIOPSY SINGLE/MULTIPLE: CPT | Performed by: INTERNAL MEDICINE

## 2020-03-05 PROCEDURE — 88305 TISSUE EXAM BY PATHOLOGIST: CPT | Performed by: PATHOLOGY

## 2020-03-05 PROCEDURE — 45380 COLONOSCOPY AND BIOPSY: CPT | Performed by: INTERNAL MEDICINE

## 2020-03-05 PROCEDURE — 88342 IMHCHEM/IMCYTCHM 1ST ANTB: CPT | Performed by: PATHOLOGY

## 2020-03-05 PROCEDURE — 45385 COLONOSCOPY W/LESION REMOVAL: CPT | Performed by: INTERNAL MEDICINE

## 2020-03-05 RX ORDER — SODIUM CHLORIDE, SODIUM LACTATE, POTASSIUM CHLORIDE, CALCIUM CHLORIDE 600; 310; 30; 20 MG/100ML; MG/100ML; MG/100ML; MG/100ML
125 INJECTION, SOLUTION INTRAVENOUS CONTINUOUS
Status: DISCONTINUED | OUTPATIENT
Start: 2020-03-05 | End: 2020-03-09 | Stop reason: HOSPADM

## 2020-03-05 RX ORDER — LIDOCAINE HYDROCHLORIDE 10 MG/ML
INJECTION, SOLUTION EPIDURAL; INFILTRATION; INTRACAUDAL; PERINEURAL AS NEEDED
Status: DISCONTINUED | OUTPATIENT
Start: 2020-03-05 | End: 2020-03-05 | Stop reason: SURG

## 2020-03-05 RX ORDER — PROPOFOL 10 MG/ML
INJECTION, EMULSION INTRAVENOUS AS NEEDED
Status: DISCONTINUED | OUTPATIENT
Start: 2020-03-05 | End: 2020-03-05 | Stop reason: SURG

## 2020-03-05 RX ORDER — ONDANSETRON 2 MG/ML
4 INJECTION INTRAMUSCULAR; INTRAVENOUS ONCE AS NEEDED
Status: CANCELLED | OUTPATIENT
Start: 2020-03-05

## 2020-03-05 RX ADMIN — PROPOFOL 20 MG: 10 INJECTION, EMULSION INTRAVENOUS at 13:52

## 2020-03-05 RX ADMIN — SODIUM CHLORIDE, SODIUM LACTATE, POTASSIUM CHLORIDE, AND CALCIUM CHLORIDE 125 ML/HR: .6; .31; .03; .02 INJECTION, SOLUTION INTRAVENOUS at 12:27

## 2020-03-05 RX ADMIN — PROPOFOL 150 MG: 10 INJECTION, EMULSION INTRAVENOUS at 13:33

## 2020-03-05 RX ADMIN — PROPOFOL 20 MG: 10 INJECTION, EMULSION INTRAVENOUS at 13:54

## 2020-03-05 RX ADMIN — PROPOFOL 30 MG: 10 INJECTION, EMULSION INTRAVENOUS at 13:35

## 2020-03-05 RX ADMIN — PROPOFOL 20 MG: 10 INJECTION, EMULSION INTRAVENOUS at 13:37

## 2020-03-05 RX ADMIN — PROPOFOL 20 MG: 10 INJECTION, EMULSION INTRAVENOUS at 13:49

## 2020-03-05 RX ADMIN — PROPOFOL 20 MG: 10 INJECTION, EMULSION INTRAVENOUS at 13:46

## 2020-03-05 RX ADMIN — PROPOFOL 20 MG: 10 INJECTION, EMULSION INTRAVENOUS at 13:40

## 2020-03-05 RX ADMIN — PROPOFOL 20 MG: 10 INJECTION, EMULSION INTRAVENOUS at 13:45

## 2020-03-05 RX ADMIN — PROPOFOL 20 MG: 10 INJECTION, EMULSION INTRAVENOUS at 13:39

## 2020-03-05 RX ADMIN — LIDOCAINE HYDROCHLORIDE 50 MG: 10 INJECTION, SOLUTION EPIDURAL; INFILTRATION; INTRACAUDAL; PERINEURAL at 13:33

## 2020-03-05 RX ADMIN — PROPOFOL 20 MG: 10 INJECTION, EMULSION INTRAVENOUS at 13:47

## 2020-03-05 RX ADMIN — PROPOFOL 20 MG: 10 INJECTION, EMULSION INTRAVENOUS at 13:43

## 2020-03-05 RX ADMIN — PROPOFOL 20 MG: 10 INJECTION, EMULSION INTRAVENOUS at 13:42

## 2020-03-05 RX ADMIN — PROPOFOL 20 MG: 10 INJECTION, EMULSION INTRAVENOUS at 13:50

## 2020-03-05 RX ADMIN — PROPOFOL 20 MG: 10 INJECTION, EMULSION INTRAVENOUS at 13:48

## 2020-03-05 NOTE — ANESTHESIA POSTPROCEDURE EVALUATION
Post-Op Assessment Note    CV Status:  Stable  Pain Score: 0    Pain management: adequate     Mental Status:  Alert and awake   Hydration Status:  Euvolemic and stable   PONV Controlled:  Controlled   Airway Patency:  Patent   Post Op Vitals Reviewed: Yes      Staff: Anesthesiologist           /82 (03/05/20 1359)    Temp      Pulse 67 (03/05/20 1359)   Resp 16 (03/05/20 1359)    SpO2 97 % (03/05/20 1359)
need for outpatient follow-up/return to ED if symptoms worsen, persist or questions arise

## 2020-03-05 NOTE — ANESTHESIA PREPROCEDURE EVALUATION
Review of Systems/Medical History  Patient summary reviewed    No history of anesthetic complications     Cardiovascular  Exercise tolerance (METS): >4,  Hyperlipidemia, Hypertension controlled, No dysrhythmias , No angina ,    Pulmonary  Smoker cigarette smoker  , Tobacco cessation counseling given ,        GI/Hepatic    Liver disease , Bowel prep       Kidney stones,        Endo/Other    Obesity    GYN       Hematology   Musculoskeletal  Negative musculoskeletal ROS        Neurology  Negative neurology ROS      Psychology   Anxiety, Depression ,              Physical Exam    Airway    Mallampati score: II  TM Distance: >3 FB  Neck ROM: full     Dental   lower dentures and upper dentures,     Cardiovascular  Rhythm: regular, Rate: normal,     Pulmonary  Breath sounds clear to auscultation,     Other Findings        Anesthesia Plan  ASA Score- 2     Anesthesia Type- IV sedation with anesthesia with ASA Monitors  Additional Monitors:   Airway Plan:         Plan Factors-  Patient did not smoke on day of surgery  Induction- intravenous  Postoperative Plan-     Informed Consent- Anesthetic plan and risks discussed with patient

## 2020-03-18 ENCOUNTER — OFFICE VISIT (OUTPATIENT)
Dept: PODIATRY | Facility: CLINIC | Age: 55
End: 2020-03-18
Payer: COMMERCIAL

## 2020-03-18 VITALS
DIASTOLIC BLOOD PRESSURE: 95 MMHG | WEIGHT: 191 LBS | HEART RATE: 95 BPM | RESPIRATION RATE: 17 BRPM | BODY MASS INDEX: 35.15 KG/M2 | HEIGHT: 62 IN | SYSTOLIC BLOOD PRESSURE: 161 MMHG

## 2020-03-18 DIAGNOSIS — M79.671 RIGHT FOOT PAIN: ICD-10-CM

## 2020-03-18 DIAGNOSIS — M79.672 LEFT FOOT PAIN: ICD-10-CM

## 2020-03-18 DIAGNOSIS — G57.51 TARSAL TUNNEL SYNDROME, RIGHT: Primary | ICD-10-CM

## 2020-03-18 DIAGNOSIS — G57.52 TARSAL TUNNEL SYNDROME OF LEFT SIDE: ICD-10-CM

## 2020-03-18 PROCEDURE — 64450 NJX AA&/STRD OTHER PN/BRANCH: CPT | Performed by: PODIATRIST

## 2020-03-18 NOTE — PROGRESS NOTES
Assessment/Plan:  Bilateral tarsal tunnel syndrome   Patient has been given diagnosis of fibromyalgia as well      Plan   Bilateral nerve block performed   Into each tarsal tunnel, 1 cc Celestone with 2 cc 2% lidocaine plain injected without pain or complication   Will maintain gabapentin dosing   Patient return for follow-up  Patient's feet casted for custom molded foot orthotics            Diagnoses and all orders for this visit:     Tarsal tunnel syndrome, right     Tarsal tunnel syndrome of left side     Pain in both feet     Metatarsalgia      Acquired pes planus bilateral           Subjective:  Patient is aware she has tarsal tunnel syndrome   This was has been proven by EMG   She has also been given diagnosis of fibromyalgia  She believes that gabapentin and the injections are helping    She is approximately 90% better      No Known Allergies        Current Outpatient Medications:     albuterol (PROVENTIL HFA,VENTOLIN HFA) 90 mcg/act inhaler, Inhale 2 puffs every 4 (four) hours as needed for wheezing or shortness of breath, Disp: 1 Inhaler, Rfl: 5    gabapentin (NEURONTIN) 300 mg capsule, Take 1 capsule (300 mg total) by mouth 3 (three) times a day, Disp: 90 capsule, Rfl: 1    ibuprofen (MOTRIN) 800 mg tablet, Take 1 tablet (800 mg total) by mouth every 6 (six) hours as needed for moderate pain (take with food), Disp: 30 tablet, Rfl: 1    Na Sulfate-K Sulfate-Mg Sulf 17 5-3 13-1 6 GM/177ML SOLN, Take 1 applicator by mouth once for 1 dose, Disp: 1 Bottle, Rfl: 0    pantoprazole (PROTONIX) 40 mg tablet, Take 1 tablet (40 mg total) by mouth daily, Disp: 90 tablet, Rfl: 0         Patient Active Problem List   Diagnosis    Acquired deformity of right foot    Closed nondisplaced fracture of body of right calcaneus    Plantar fasciitis    Right foot pain    Left foot pain    Acquired ankle/foot deformity    Reduced libido    Elevated testosterone level in female    Hirsutism    Hypercholesterolemia  Impaired fasting glucose    Nicotine dependence    Pes planus, congenital    Plantar fibromatosis    Plantar fasciitis of right foot    Heel spur, right    Shortness of breath    LBBB (left bundle branch block)    Essential hypertension    Dyslipidemia    Sleep disturbance    Congenital pes planus    Difficulty walking    Plantar fascial fibromatosis    Bursitis of left foot    Heel spur, left    Generalized anxiety disorder    Radiculopathy of lumbar region    Acquired deformity of left foot    Metatarsalgia of both feet    Chronic pain of both feet    Severe obesity (BMI 35 0-39  9) with comorbidity ([address] )    Tailor's bunion of both feet    Fatty liver    Tarsal tunnel syndrome of left side             Patient ID: Darya Morin is a 47 y  o  female      HPI     The following portions of the patient's history were reviewed and updated as appropriate:      family history includes Diabetes in her mother; Fibromyalgia in her mother; Hypertension in her father; Hyperthyroidism in her father; Hypothyroidism in her father; Stroke in her maternal grandfather        reports that she has been smoking  She has a 15 00 pack-year smoking history  She has never used smokeless tobacco  She reports that she drinks alcohol   She reports that she does not use drugs            Vitals:     02/20/20 1700   BP: 138/77         Review of Systems       Objective:  Patient's shoes and socks removed    Foot ExamPhysical Exam        Foot Exam     General  General Appearance: appears stated age and healthy   Orientation: alert and oriented to person, place, and time   Affect: appropriate   Gait: antalgic         Right Foot/Ankle      Inspection and Palpation  Ecchymosis: none  Tenderness: calcaneus tenderness, bony tenderness, lesser metatarsophalangeal joints and plantar fascia   Swelling: dorsum and metatarsals   Arch: pes planus  Hammertoes: fifth toe     Neurovascular  Dorsalis pedis: 3+  Posterior tibial: 3+  Superficial peroneal nerve sensation: diminished  Deep peroneal nerve sensation: diminished        Left Foot/Ankle       Inspection and Palpation  Ecchymosis: none  Tenderness: bony tenderness, lesser metatarsophalangeal joints, plantar fascia and calcaneus tenderness   Swelling: dorsum and metatarsals   Arch: pes planus  Hammertoes: fifth toe     Neurovascular  Dorsalis pedis: 3+  Posterior tibial: 3+  Superficial peroneal nerve sensation: diminished  Deep peroneal nerve sensation: diminished           Physical Exam   Constitutional: She appears well-developed and well-nourished  Cardiovascular: Normal rate and regular rhythm  Pulses:       Dorsalis pedis pulses are 3+ on the right side, and 3+ on the left side         Posterior tibial pulses are 3+ on the right side, and 3+ on the left side  Musculoskeletal: She exhibits edema, tenderness and deformity       Right foot: There is bony tenderness         Left foot: There is bony tenderness     Neurological:   Positive Tinel bilateral tibial nerve

## 2020-03-26 ENCOUNTER — TELEPHONE (OUTPATIENT)
Dept: GASTROENTEROLOGY | Facility: CLINIC | Age: 55
End: 2020-03-26

## 2020-03-30 ENCOUNTER — TELEPHONE (OUTPATIENT)
Dept: GASTROENTEROLOGY | Facility: AMBULARY SURGERY CENTER | Age: 55
End: 2020-03-30

## 2020-03-31 ENCOUNTER — TELEPHONE (OUTPATIENT)
Dept: GASTROENTEROLOGY | Facility: AMBULARY SURGERY CENTER | Age: 55
End: 2020-03-31

## 2020-03-31 ENCOUNTER — TELEMEDICINE (OUTPATIENT)
Dept: GASTROENTEROLOGY | Facility: AMBULARY SURGERY CENTER | Age: 55
End: 2020-03-31
Payer: COMMERCIAL

## 2020-03-31 DIAGNOSIS — Z86.010 HISTORY OF COLON POLYPS: ICD-10-CM

## 2020-03-31 DIAGNOSIS — R19.5 LOOSE STOOLS: Primary | ICD-10-CM

## 2020-03-31 DIAGNOSIS — K29.30 CHRONIC SUPERFICIAL GASTRITIS WITHOUT BLEEDING: ICD-10-CM

## 2020-03-31 PROCEDURE — 99213 OFFICE O/P EST LOW 20 MIN: CPT | Performed by: PHYSICIAN ASSISTANT

## 2020-03-31 NOTE — PROGRESS NOTES
Virtual Brief Visit    Problem List Items Addressed This Visit     None                Reason for visit is loose stool    [facility]    Provider located at [facility]  [address] [place] [address]      Recent Visits  Date Type Provider Dept   03/30/20 Telephone [facility] recent visits within past 7 days and meeting all other requirements     Today's Visits  Date Type Provider Dept   03/31/20 Telephone [facility] [place] [address] today's visits and meeting all other requirements     Future Appointments  Date Type Provider Dept   03/31/20 Telephone [facility] [id number]   Showing future appointments within next 150 days and meeting all other requirements        After connecting through telephone, the patient was identified by name and date of birth  Marlene Brown was informed that this is a telemedicine visit and that the visit is being conducted through telephone  My office door was closed  No one else was in the room  She acknowledged consent and understanding of privacy and security of the video platform  The patient has agreed to participate and understands they can discontinue the visit at any time  Patient is aware this is a billable service  Subjective  Marlene Brown is a 47 y o  female with a history of hypertension, left bundle branch block, radiculopathy, bursitis, and plantar fasciitis  She saw Dr Scott Coburn with beginning of February secondary epigastric and right upper quadrant abdominal pain after a fatty meal   It was thought to possibly be her gallbladder  She underwent EGD and colonoscopy at the beginning of March  EGD showed moderate gastritis and irregular Z-line  Biopsies were negative for H pylori, Bess's esophagus  Colonoscopy had 3 colon polyps removed    She was recommended for repeat colonoscopy in 3 years  Her main issue is that she reports that she will intermittently off and on have more mushy stools  She reports that prior to December she was having regular formed bowel movements  Since then she is intermittently having more mushy stools that happen usually twice a day  Denies any blood in the stool or black tarry stools  He denies any new medications at the start of this  Denies any significant NSAID use  Her biopsies were negative for celiac disease as well as microscopic colitis  She denies any sick contacts or any recent antibiotic use  She does work at a nursing home however her symptoms have been off and on for several months of doubt for infectious etiology  She denies any heartburn, trouble swallowing, GERD symptoms  She reports that the pain that she was having previously is improved  She has been taking a PPI once daily        Past Medical History:   Diagnosis Date    Apnea 01/22/2009    Arthralgia     last assessed 6/28/13    Depression 10/09/2006    Dysmenorrhea 01/22/2009    Essential hypertension 3/8/2018    Exposure to potentially hazardous body fluids     last assessed 6/1/16    Insomnia 10/09/2006    Kidney stone     20 years ago    Muscle weakness (generalized) 08/17/2006    Obesity     Viral gastroenteritis     last assessed 6/13/13       Past Surgical History:   Procedure Laterality Date    APPENDECTOMY      last assessed 5/1/17    PLANTAR FASCIA SURGERY Right 3/30/2018    Procedure: Resection heel spur;  Surgeon: Colin Page DPM;  Location: [address] [place] [address];  Service: Podiatry    PLANTAR FASCIA SURGERY Left 5/4/2018    Procedure: RELEASE FASCIA PLANTAR/FASCIOTOMY;  Surgeon: Colin Page DPM;  Location: [address] [place] [address];  Service: Podiatry    [place] REMOVAL OF HEEL SPUR  5/4/2018    Procedure: Heel spur resection with partial plantar fasciectomy;  Surgeon: Colin Page DPM;  Location: [facility] OR;  Service: Podiatry    SALPINGECTOMY      had a tubal pregnancy    SALPINGOSTOMY      TONSILLECTOMY         Current Outpatient Medications   Medication Sig Dispense Refill    albuterol (PROVENTIL HFA,VENTOLIN HFA) 90 mcg/act inhaler Inhale 2 puffs every 4 (four) hours as needed for wheezing or shortness of breath 1 Inhaler 5    gabapentin (NEURONTIN) 300 mg capsule Take 1 capsule (300 mg total) by mouth 3 (three) times a day 90 capsule 1    gabapentin (NEURONTIN) 600 MG tablet Take 1 tablet (600 mg total) by mouth 2 (two) times a day 60 tablet 0    ibuprofen (MOTRIN) 800 mg tablet Take 1 tablet (800 mg total) by mouth every 6 (six) hours as needed for moderate pain (take with food) 30 tablet 1    pantoprazole (PROTONIX) 40 mg tablet Take 1 tablet (40 mg total) by mouth daily 90 tablet 0     No current facility-administered medications for this visit  No Known Allergies    REVIEW OF SYSTEMS:    CONSTITUTIONAL: Denies any fever, chills, rigors, and weight loss  HEENT: No earache or tinnitus  Denies hearing loss or visual disturbances  CARDIOVASCULAR: No chest pain or palpitations  RESPIRATORY: Denies any cough, hemoptysis, shortness of breath or dyspnea on exertion  GASTROINTESTINAL: As noted in the History of Present Illness  GENITOURINARY: No problems with urination  Denies any hematuria or dysuria  NEUROLOGIC: No dizziness or vertigo, denies headaches  MUSCULOSKELETAL: Denies any muscle or joint pain  SKIN: Denies skin rashes or itching  ENDOCRINE: Denies excessive thirst  Denies intolerance to heat or cold  PSYCHOSOCIAL: Denies depression or anxiety  Denies any recent memory loss  ASSESSMENT AND PLAN    #1  Loose stool:  Suspect functional, doubt for infectious etiology as she has been having this off and on since December  She also had normal colonoscopy without colitis or microscopic colitis  Biopsies also negative for celiac disease  No new medications  Denies any significant abdominal cramping    She has had increased stress recently   -I advised that she try doing a fiber supplement 2 tbsp daily for the next 3 weeks and give us an update in 3 weeks  Could consider Bentyl as well in the future  -also ordered TSH and food allergy panel for further evaluation  -if symptoms worsen patient was advised to call us sooner    #2  History of colon polyps  -repeat colonoscopy is due in 2023    #3  Gastritis  -continue PPI for now  -avoid NSAIDs  -nonulcer genetic diet    I spent 15 minutes with the patient during this visit

## 2020-04-21 ENCOUNTER — OFFICE VISIT (OUTPATIENT)
Dept: PODIATRY | Facility: CLINIC | Age: 55
End: 2020-04-21
Payer: COMMERCIAL

## 2020-04-21 VITALS
SYSTOLIC BLOOD PRESSURE: 161 MMHG | RESPIRATION RATE: 17 BRPM | HEART RATE: 95 BPM | WEIGHT: 191 LBS | BODY MASS INDEX: 35.15 KG/M2 | DIASTOLIC BLOOD PRESSURE: 95 MMHG | HEIGHT: 62 IN

## 2020-04-21 DIAGNOSIS — M79.672 LEFT FOOT PAIN: ICD-10-CM

## 2020-04-21 DIAGNOSIS — E11.42 DIABETIC POLYNEUROPATHY ASSOCIATED WITH TYPE 2 DIABETES MELLITUS (HCC): ICD-10-CM

## 2020-04-21 DIAGNOSIS — M79.671 RIGHT FOOT PAIN: ICD-10-CM

## 2020-04-21 DIAGNOSIS — M54.16 RADICULOPATHY OF LUMBAR REGION: ICD-10-CM

## 2020-04-21 DIAGNOSIS — G57.52 TARSAL TUNNEL SYNDROME OF LEFT SIDE: ICD-10-CM

## 2020-04-21 DIAGNOSIS — G57.51 TARSAL TUNNEL SYNDROME, RIGHT: Primary | ICD-10-CM

## 2020-04-21 PROCEDURE — 99212 OFFICE O/P EST SF 10 MIN: CPT | Performed by: PODIATRIST

## 2020-04-21 PROCEDURE — 64450 NJX AA&/STRD OTHER PN/BRANCH: CPT | Performed by: PODIATRIST

## 2020-04-21 RX ORDER — GABAPENTIN 600 MG/1
600 TABLET ORAL 3 TIMES DAILY
Qty: 90 TABLET | Refills: 0 | Status: SHIPPED | OUTPATIENT
Start: 2020-04-21 | End: 2021-07-14 | Stop reason: ALTCHOICE

## 2020-04-21 RX ORDER — METHYLPREDNISOLONE 4 MG/1
TABLET ORAL
Qty: 21 TABLET | Refills: 0 | Status: SHIPPED | OUTPATIENT
Start: 2020-04-21 | End: 2021-07-14 | Stop reason: ALTCHOICE

## 2020-05-20 ENCOUNTER — TELEPHONE (OUTPATIENT)
Dept: FAMILY MEDICINE CLINIC | Facility: CLINIC | Age: 55
End: 2020-05-20

## 2020-06-08 ENCOUNTER — TELEMEDICINE (OUTPATIENT)
Dept: FAMILY MEDICINE CLINIC | Facility: CLINIC | Age: 55
End: 2020-06-08
Payer: COMMERCIAL

## 2020-06-08 DIAGNOSIS — Z20.828 EXPOSURE TO SARS-ASSOCIATED CORONAVIRUS: ICD-10-CM

## 2020-06-08 DIAGNOSIS — Z20.828 EXPOSURE TO SARS-ASSOCIATED CORONAVIRUS: Primary | ICD-10-CM

## 2020-06-08 PROCEDURE — U0003 INFECTIOUS AGENT DETECTION BY NUCLEIC ACID (DNA OR RNA); SEVERE ACUTE RESPIRATORY SYNDROME CORONAVIRUS 2 (SARS-COV-2) (CORONAVIRUS DISEASE [COVID-19]), AMPLIFIED PROBE TECHNIQUE, MAKING USE OF HIGH THROUGHPUT TECHNOLOGIES AS DESCRIBED BY CMS-2020-01-R: HCPCS

## 2020-06-08 PROCEDURE — 99213 OFFICE O/P EST LOW 20 MIN: CPT | Performed by: NURSE PRACTITIONER

## 2020-06-09 LAB — SARS-COV-2 RNA SPEC QL NAA+PROBE: NOT DETECTED

## 2020-08-05 ENCOUNTER — TELEPHONE (OUTPATIENT)
Dept: FAMILY MEDICINE CLINIC | Facility: CLINIC | Age: 55
End: 2020-08-05

## 2020-08-05 DIAGNOSIS — Z20.828 EXPOSURE TO SARS-ASSOCIATED CORONAVIRUS: ICD-10-CM

## 2020-08-05 DIAGNOSIS — Z20.828 EXPOSURE TO SARS-ASSOCIATED CORONAVIRUS: Primary | ICD-10-CM

## 2020-08-05 PROCEDURE — U0003 INFECTIOUS AGENT DETECTION BY NUCLEIC ACID (DNA OR RNA); SEVERE ACUTE RESPIRATORY SYNDROME CORONAVIRUS 2 (SARS-COV-2) (CORONAVIRUS DISEASE [COVID-19]), AMPLIFIED PROBE TECHNIQUE, MAKING USE OF HIGH THROUGHPUT TECHNOLOGIES AS DESCRIBED BY CMS-2020-01-R: HCPCS | Performed by: NURSE PRACTITIONER

## 2020-08-05 NOTE — TELEPHONE ENCOUNTER
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

## 2020-08-05 NOTE — TELEPHONE ENCOUNTER
I called patient and informed that I will do order COVID-19 test for screening for her job but cannot put standing orders  Informed about might get charged $150 if not covered by insurance   Patient understands and wants testing for her work Kimmy Daniels, [employer]

## 2020-08-06 LAB — SARS-COV-2 RNA SPEC QL NAA+PROBE: NOT DETECTED

## 2020-09-30 ENCOUNTER — OFFICE VISIT (OUTPATIENT)
Dept: FAMILY MEDICINE CLINIC | Facility: CLINIC | Age: 55
End: 2020-09-30
Payer: COMMERCIAL

## 2020-09-30 VITALS
RESPIRATION RATE: 20 BRPM | SYSTOLIC BLOOD PRESSURE: 166 MMHG | BODY MASS INDEX: 36.07 KG/M2 | WEIGHT: 196 LBS | OXYGEN SATURATION: 94 % | HEART RATE: 89 BPM | HEIGHT: 62 IN | TEMPERATURE: 99.2 F | DIASTOLIC BLOOD PRESSURE: 92 MMHG

## 2020-09-30 DIAGNOSIS — I10 ESSENTIAL HYPERTENSION: ICD-10-CM

## 2020-09-30 DIAGNOSIS — M77.11 RIGHT LATERAL EPICONDYLITIS: Primary | ICD-10-CM

## 2020-09-30 PROCEDURE — 3077F SYST BP >= 140 MM HG: CPT | Performed by: NURSE PRACTITIONER

## 2020-09-30 PROCEDURE — 3725F SCREEN DEPRESSION PERFORMED: CPT | Performed by: NURSE PRACTITIONER

## 2020-09-30 PROCEDURE — 99213 OFFICE O/P EST LOW 20 MIN: CPT | Performed by: NURSE PRACTITIONER

## 2020-09-30 PROCEDURE — 4004F PT TOBACCO SCREEN RCVD TLK: CPT | Performed by: NURSE PRACTITIONER

## 2020-09-30 PROCEDURE — 3080F DIAST BP >= 90 MM HG: CPT | Performed by: NURSE PRACTITIONER

## 2020-09-30 RX ORDER — BACLOFEN 10 MG/1
10 TABLET ORAL 2 TIMES DAILY
Qty: 20 TABLET | Refills: 0 | Status: SHIPPED | OUTPATIENT
Start: 2020-09-30 | End: 2021-07-14 | Stop reason: ALTCHOICE

## 2020-09-30 RX ORDER — PREDNISONE 10 MG/1
TABLET ORAL
Qty: 20 TABLET | Refills: 0 | Status: SHIPPED | OUTPATIENT
Start: 2020-09-30 | End: 2020-10-10

## 2020-09-30 NOTE — PATIENT INSTRUCTIONS
Take prednisone with food in morning and do not take any NSAID's while taking prednisone  Do not drive and operate any machinery after taking muscle relaxant  Use elbow brace  Supportive care discussed and advised  Advised to RTO for any worsening and no improvement  Follow up for no improvement and worsening of conditions  Patient advised and educated when to see immediate medical care

## 2020-09-30 NOTE — PROGRESS NOTES
Assessment/Plan:  Advised to follow up with orthopedics if no improvement with current treatment  1  Right lateral epicondylitis  -     predniSONE 10 mg tablet; 4 tabs x 2 days, 3 tabs x 2 days, 2 tabs x 2 days, 1 tab x 2 days  -     Ambulatory referral to Orthopedic Surgery; Future  -     baclofen 10 mg tablet; Take 1 tablet (10 mg total) by mouth 2 (two) times a day    2  Essential hypertension  Comments:  Refusing BP medications          BMI Counseling: Body mass index is 35 85 kg/m²  Discussed the patient's BMI with her  The BMI is above normal  Nutrition recommendations include reducing portion sizes, decreasing overall calorie intake, 3-5 servings of fruits/vegetables daily, reducing fast food intake, consuming healthier snacks, decreasing soda and/or juice intake, moderation in carbohydrate intake, increasing intake of lean protein, reducing intake of saturated fat and trans fat and reducing intake of cholesterol  Patient Instructions:  Also advised not to put elbow on arm rest  Take prednisone with food in morning and do not take any NSAID's while taking prednisone  Do not drive and operate any machinery after taking muscle relaxant  Use elbow brace  Supportive care discussed and advised  Advised to RTO for any worsening and no improvement  Follow up for no improvement and worsening of conditions  Patient advised and educated when to see immediate medical care  Return if symptoms worsen or fail to improve  Future Appointments   Date Time Provider Nancie Baird   9/30/2020  4:00 PM DONTAE Trent [facility]           Subjective:      Patient ID: Bipin Bar is a 54 y o  female      Chief Complaint   Patient presents with    Elbow Pain     patient denies taruma to area , has been having right elbow pain for the past week jlopezcma          Vitals:  /92   Pulse 89   Temp 99 2 °F (37 3 °C)   Resp 20   Ht 5' 2" (1 575 m)   Wt 88 9 kg (196 lb)   SpO2 94%   BMI 35 85 kg/m²     HPI  Patient stated that having pain at right elbow area from couple of days and getting worse  Stated that any kind of movement is causing pain and now also noticing some swelling in right hand too  Denies any injury  Stated that places her elbow on arm rest   BP elevation noted in office and was prescribed amlodipine by PCP in 2019 and patient stopped taking it herself and stated that I controlled by myself and aware about complications related to elevated BP but still does not want any medications and wants to controlled with lifestyle modifications  PHQ-9 Depression Screening    PHQ-9:    Frequency of the following problems over the past two weeks:       Little interest or pleasure in doing things:  0 - not at all  Feeling down, depressed, or hopeless:  0 - not at all  PHQ-2 Score:  0             The following portions of the patient's history were reviewed and updated as appropriate: allergies, current medications, past family history, past medical history, past social history, past surgical history and problem list       Review of Systems   Constitutional: Negative  Respiratory: Negative  Cardiovascular: Negative  Musculoskeletal: Positive for arthralgias  As noted in HPI     Skin: Negative  Neurological: Negative for dizziness  Psychiatric/Behavioral: Negative            Objective:    Social History     Tobacco Use   Smoking Status Current Every Day Smoker    Packs/day: 0 50    Years: 30 00    Pack years: 15 00    Last attempt to quit: 3/8/2014    Years since quittin 5   Smokeless Tobacco Never Used       Allergies: No Known Allergies      Current Outpatient Medications   Medication Sig Dispense Refill    albuterol (PROVENTIL HFA,VENTOLIN HFA) 90 mcg/act inhaler Inhale 2 puffs every 4 (four) hours as needed for wheezing or shortness of breath (Patient not taking: Reported on 2020) 1 Inhaler 5    baclofen 10 mg tablet Take 1 tablet (10 mg total) by mouth 2 (two) times a day 20 tablet 0    gabapentin (NEURONTIN) 300 mg capsule Take 1 capsule (300 mg total) by mouth 3 (three) times a day (Patient not taking: Reported on 9/30/2020) 90 capsule 1    gabapentin (NEURONTIN) 600 MG tablet Take 1 tablet (600 mg total) by mouth 2 (two) times a day (Patient not taking: Reported on 9/30/2020) 60 tablet 0    gabapentin (NEURONTIN) 600 MG tablet Take 1 tablet (600 mg total) by mouth 3 (three) times a day (Patient not taking: Reported on 9/30/2020) 90 tablet 0    ibuprofen (MOTRIN) 800 mg tablet Take 1 tablet (800 mg total) by mouth every 6 (six) hours as needed for moderate pain (take with food) (Patient not taking: Reported on 9/30/2020) 30 tablet 1    methylPREDNISolone 4 MG tablet therapy pack Use as directed on package (Patient not taking: Reported on 9/30/2020) 21 tablet 0    pantoprazole (PROTONIX) 40 mg tablet Take 1 tablet (40 mg total) by mouth daily (Patient not taking: Reported on 9/30/2020) 90 tablet 0    predniSONE 10 mg tablet 4 tabs x 2 days, 3 tabs x 2 days, 2 tabs x 2 days, 1 tab x 2 days 20 tablet 0     No current facility-administered medications for this visit  Physical Exam  Constitutional:       Appearance: Normal appearance  HENT:      Head: Normocephalic and atraumatic  Nose: Nose normal    Eyes:      Conjunctiva/sclera: Conjunctivae normal    Cardiovascular:      Rate and Rhythm: Normal rate and regular rhythm  Pulses: Normal pulses  Heart sounds: Normal heart sounds  Pulmonary:      Effort: Pulmonary effort is normal       Breath sounds: Normal breath sounds  Musculoskeletal:      Right elbow: She exhibits no swelling and no effusion  Tenderness found  Lateral epicondyle tenderness noted  Comments: Mild diffuse swelling noted on right hand but FROM noted and no tenderness of fingers, hand and wrist joint noted   Skin:     General: Skin is warm and dry  Findings: No rash     Neurological:      Mental Status: She is alert and oriented to person, place, and time  Psychiatric:         Mood and Affect: Mood normal          Behavior: Behavior normal          Thought Content:  Thought content normal          Judgment: Judgment normal                      DONTAE Trent

## 2020-12-14 ENCOUNTER — VBI (OUTPATIENT)
Dept: ADMINISTRATIVE | Facility: OTHER | Age: 55
End: 2020-12-14

## 2021-02-23 ENCOUNTER — VBI (OUTPATIENT)
Dept: ADMINISTRATIVE | Facility: OTHER | Age: 56
End: 2021-02-23

## 2021-02-23 NOTE — TELEPHONE ENCOUNTER
02/23/21 11:32 AM     See documentation in the [employer] SmartForm       Maurisio Underwood, [address] [facility]

## 2021-07-14 ENCOUNTER — APPOINTMENT (OUTPATIENT)
Dept: RADIOLOGY | Facility: CLINIC | Age: 56
End: 2021-07-14
Payer: COMMERCIAL

## 2021-07-14 ENCOUNTER — OFFICE VISIT (OUTPATIENT)
Dept: FAMILY MEDICINE CLINIC | Facility: CLINIC | Age: 56
End: 2021-07-14
Payer: COMMERCIAL

## 2021-07-14 VITALS
TEMPERATURE: 99.3 F | WEIGHT: 194 LBS | DIASTOLIC BLOOD PRESSURE: 86 MMHG | SYSTOLIC BLOOD PRESSURE: 158 MMHG | HEIGHT: 62 IN | BODY MASS INDEX: 35.7 KG/M2 | HEART RATE: 94 BPM | RESPIRATION RATE: 16 BRPM | OXYGEN SATURATION: 97 %

## 2021-07-14 DIAGNOSIS — Z72.0 NICOTINE ABUSE: Primary | ICD-10-CM

## 2021-07-14 DIAGNOSIS — M25.561 RIGHT KNEE PAIN, UNSPECIFIED CHRONICITY: ICD-10-CM

## 2021-07-14 DIAGNOSIS — Z12.31 ENCOUNTER FOR SCREENING MAMMOGRAM FOR MALIGNANT NEOPLASM OF BREAST: ICD-10-CM

## 2021-07-14 DIAGNOSIS — I10 ESSENTIAL HYPERTENSION: ICD-10-CM

## 2021-07-14 PROBLEM — E11.42 DIABETIC POLYNEUROPATHY ASSOCIATED WITH TYPE 2 DIABETES MELLITUS (HCC): Status: ACTIVE | Noted: 2021-07-14

## 2021-07-14 PROBLEM — E11.42 DIABETIC POLYNEUROPATHY ASSOCIATED WITH TYPE 2 DIABETES MELLITUS (HCC): Status: RESOLVED | Noted: 2021-07-14 | Resolved: 2021-07-14

## 2021-07-14 PROCEDURE — 99214 OFFICE O/P EST MOD 30 MIN: CPT | Performed by: FAMILY MEDICINE

## 2021-07-14 PROCEDURE — 3008F BODY MASS INDEX DOCD: CPT | Performed by: FAMILY MEDICINE

## 2021-07-14 PROCEDURE — 4004F PT TOBACCO SCREEN RCVD TLK: CPT | Performed by: FAMILY MEDICINE

## 2021-07-14 PROCEDURE — 3077F SYST BP >= 140 MM HG: CPT | Performed by: FAMILY MEDICINE

## 2021-07-14 PROCEDURE — 73562 X-RAY EXAM OF KNEE 3: CPT

## 2021-07-14 PROCEDURE — 3079F DIAST BP 80-89 MM HG: CPT | Performed by: FAMILY MEDICINE

## 2021-07-14 RX ORDER — IBUPROFEN 800 MG/1
800 TABLET ORAL EVERY 6 HOURS PRN
Qty: 30 TABLET | Refills: 1 | Status: SHIPPED | OUTPATIENT
Start: 2021-07-14 | End: 2021-09-08 | Stop reason: ALTCHOICE

## 2021-07-14 RX ORDER — VARENICLINE TARTRATE 25 MG
KIT ORAL
Qty: 53 TABLET | Refills: 0 | Status: SHIPPED | OUTPATIENT
Start: 2021-07-14 | End: 2022-02-23

## 2021-07-14 NOTE — PROGRESS NOTES
Assessment/Plan:     Diagnoses and all orders for this visit:    Nicotine abuse  -     varenicline (CHANTIX TAWANNA) 0 5 MG X 11 & 1 MG X 42 tablet; Take one 0 5mg tablet by mouth once daily for 3 days, then increase to one 0 5mg tablet twice daily for 3 days, then increase to one 1mg tablet twice daily  Right knee pain, unspecified chronicity  -     ibuprofen (MOTRIN) 800 mg tablet; Take 1 tablet (800 mg total) by mouth every 6 (six) hours as needed for moderate pain (take with food)  -     XR knee 3 vw right non injury; Future  - Counseled on rest, ice to the area, leg elevation, ace bandage    - Return if symptoms worsen or fail to improve  Encounter for screening mammogram for malignant neoplasm of breast  -     Mammo screening bilateral w 3d & cad; Future      Essential hypertension         - she is not on any medication for hypertension  states her BP is usually well controlled, but it is high today due to current knee pain  she will return for CPE    Subjective:      Patient ID: Judi Cobian is a 64 y o  female  Knee Pain   There was no injury mechanism (pain began 1 week ago)  The pain is present in the right knee  The pain is at a severity of 9/10  The pain has been worsening since onset  Pertinent negatives include no inability to bear weight, loss of motion, loss of sensation, muscle weakness, numbness or tingling  She has tried nothing for the symptoms  The treatment provided no relief  Nicotine Use  Has been smoking for the past 2 5 years, up to 1 pack/day  She is ready to quit, but is having a hard time  Has tried nicotine patches and gum which did not help  The following portions of the patient's history were reviewed and updated as appropriate: allergies, current medications, past family history, past medical history, past social history, past surgical history and problem list     Review of Systems   Constitutional: Negative  HENT: Negative  Eyes: Negative      Respiratory: Negative  Cardiovascular: Negative  Gastrointestinal: Negative  Endocrine: Negative  Genitourinary: Negative  Musculoskeletal: Negative  Right knee pain and swelling   Skin: Negative  Allergic/Immunologic: Negative  Neurological: Negative  Negative for tingling and numbness  Hematological: Negative  Psychiatric/Behavioral: Negative  Objective:      /86   Pulse 94   Temp 99 3 °F (37 4 °C)   Resp 16   Ht 5' 2" (1 575 m)   Wt 88 kg (194 lb)   SpO2 97%   BMI 35 48 kg/m²          Physical Exam  Constitutional:       General: She is not in acute distress  Appearance: She is well-developed  She is not diaphoretic  HENT:      Head: Normocephalic and atraumatic  Eyes:      General: No scleral icterus  Right eye: No discharge  Left eye: No discharge  Conjunctiva/sclera: Conjunctivae normal    Pulmonary:      Effort: Pulmonary effort is normal    Musculoskeletal:      Cervical back: Normal range of motion  Right hip: Normal       Right knee: Swelling present  No deformity, effusion, erythema, ecchymosis or lacerations  Normal range of motion  Tenderness present over the medial joint line  Left knee: Normal       Right lower leg: Normal    Skin:     General: Skin is warm  Neurological:      Mental Status: She is alert and oriented to person, place, and time  Psychiatric:         Behavior: Behavior normal          Thought Content:  Thought content normal          Judgment: Judgment normal

## 2021-07-16 ENCOUNTER — TELEPHONE (OUTPATIENT)
Dept: FAMILY MEDICINE CLINIC | Facility: CLINIC | Age: 56
End: 2021-07-16

## 2021-09-08 ENCOUNTER — OFFICE VISIT (OUTPATIENT)
Dept: OBGYN CLINIC | Facility: CLINIC | Age: 56
End: 2021-09-08
Payer: COMMERCIAL

## 2021-09-08 VITALS
BODY MASS INDEX: 35.7 KG/M2 | HEART RATE: 92 BPM | DIASTOLIC BLOOD PRESSURE: 90 MMHG | SYSTOLIC BLOOD PRESSURE: 148 MMHG | HEIGHT: 62 IN | WEIGHT: 194 LBS

## 2021-09-08 DIAGNOSIS — M17.11 OSTEOARTHRITIS OF RIGHT PATELLOFEMORAL JOINT: Primary | ICD-10-CM

## 2021-09-08 DIAGNOSIS — G89.29 CHRONIC PAIN OF RIGHT KNEE: ICD-10-CM

## 2021-09-08 DIAGNOSIS — M76.31 IT BAND SYNDROME, RIGHT: ICD-10-CM

## 2021-09-08 DIAGNOSIS — M25.561 CHRONIC PAIN OF RIGHT KNEE: ICD-10-CM

## 2021-09-08 DIAGNOSIS — M79.661 RIGHT CALF PAIN: ICD-10-CM

## 2021-09-08 PROCEDURE — 4004F PT TOBACCO SCREEN RCVD TLK: CPT | Performed by: ORTHOPAEDIC SURGERY

## 2021-09-08 PROCEDURE — 99204 OFFICE O/P NEW MOD 45 MIN: CPT | Performed by: ORTHOPAEDIC SURGERY

## 2021-09-08 PROCEDURE — 3008F BODY MASS INDEX DOCD: CPT | Performed by: ORTHOPAEDIC SURGERY

## 2021-09-08 RX ORDER — MELOXICAM 7.5 MG/1
7.5 TABLET ORAL DAILY
Qty: 30 TABLET | Refills: 1 | Status: SHIPPED | OUTPATIENT
Start: 2021-09-08 | End: 2022-02-23

## 2021-09-08 NOTE — PROGRESS NOTES
Assessment/Plan:  1  Osteoarthritis of right patellofemoral joint  meloxicam (MOBIC) 7 5 mg tablet    Ambulatory referral to Physical Therapy   2  Chronic pain of right knee  meloxicam (MOBIC) 7 5 mg tablet    Ambulatory referral to Physical Therapy   3  It band syndrome, right  meloxicam (MOBIC) 7 5 mg tablet    Ambulatory referral to Physical Therapy   4  Right calf pain  meloxicam (MOBIC) 7 5 mg tablet    Ambulatory referral to Physical Therapy      Darya is a very pleasant 20-year-old female presenting  Today for initial evaluation of her activity related right knee and leg pain  After reviewing her imaging, history, and physical exam, I believe that she is primarily symptomatic of her moderate right knee osteoarthritis as well as distal IT band syndrome and calf pain  I do not have any concern for  Meniscal or ligamentous injury at this time,   As she is free of mechanical symptoms  We discussed the nature of her osteoarthritis and potential treatment options  We agreed to start conservatively with a daily anti-inflammatory with Mobic to take the place of ibuprofen  She understands to take this daily with food  I have also provided her with a referral to physical therapy for improvement of her patellofemoral mechanics and treatment of her right  IT band syndrome and calf pain  I would like her to return in 6-8 weeks for a clinical re-evaluation pending the efficacy of the aforementioned treatments  She expressed understanding all of her questions were addressed today    Subjective:  Right knee pain    Patient ID: Becky Lenz is a 64 y o  female  Darya is a pleasant 20-year-old female presenting today for evaluation of 2-3 months of atraumatic right knee and leg pain  She denies any history of knee injury or surgery  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    She has noted increased discomfort in the knee throughout the day, worse at night when she goes to bed  She also complains of pain in the right thigh and calf  She did see her PCP who took an x-ray and noted osteoarthritis  She has taken occasional ibuprofen and Tylenol, but does not like to take medication and has not noted significant benefit from these  She locates the pain primarily in the anterior aspect of the knee, lateral aspect of the thigh, and posterior aspect of the calf  She denies any history of blood clots  She is an everyday smoker  She denies any history of injections, physical therapy, brace wear, or use of ambulatory assistive device  The pain does effect her on a daily basis    Review of Systems   Constitutional: Positive for activity change  HENT: Negative  Eyes: Negative  Respiratory: Negative  Cardiovascular: Positive for leg swelling  Gastrointestinal: Negative  Endocrine: Negative  Genitourinary: Negative  Musculoskeletal: Positive for arthralgias, gait problem, joint swelling and myalgias  Skin: Negative  Allergic/Immunologic: Negative  Hematological: Negative  Psychiatric/Behavioral: Negative        Past Medical History:   Diagnosis Date    Apnea 01/22/2009    Arthralgia     last assessed 6/28/13    Depression 10/09/2006    Dysmenorrhea 01/22/2009    Essential hypertension 3/8/2018    Exposure to potentially hazardous body fluids     last assessed 6/1/16    Insomnia 10/09/2006    Kidney stone     20 years ago    Muscle weakness (generalized) 08/17/2006    Obesity     Viral gastroenteritis     last assessed 6/13/13       Past Surgical History:   Procedure Laterality Date    APPENDECTOMY      last assessed 5/1/17    PLANTAR FASCIA SURGERY Right 3/30/2018    Procedure: Resection heel spur;  Surgeon: Vitaly Miller DPM;  Location: [address] [place] [address];  Service: Podiatry    PLANTAR FASCIA SURGERY Left 5/4/2018    Procedure: RELEASE FASCIA PLANTAR/FASCIOTOMY;  Surgeon: Vitaly Miller DPM;  Location: [address] [place] [address]; Service: Podiatry    DE REMOVAL OF HEEL SPUR  5/4/2018    Procedure: Heel spur resection with partial plantar fasciectomy;  Surgeon: Santos Huerta DPM;  Location: [facility];  Service: Podiatry    SALPINGECTOMY      had a tubal pregnancy    SALPINGOSTOMY      TONSILLECTOMY         Family History   Problem Relation Age of Onset    Diabetes Mother     Fibromyalgia Mother     Hypertension Father     Hyperthyroidism Father     Hypothyroidism Father     Stroke Maternal Grandfather     Mental illness Neg Hx        Social History     Occupational History    Not on file   Tobacco Use    Smoking status: Current Every Day Smoker     Packs/day: 0 50     Years: 30 00     Pack years: 15 00    Smokeless tobacco: Never Used   Vaping Use    Vaping Use: Never used   Substance and Sexual Activity    Alcohol use: Yes     Comment: OCCASIONALLY     Drug use: No    Sexual activity: Not on file         Current Outpatient Medications:     meloxicam (MOBIC) 7 5 mg tablet, Take 1 tablet (7 5 mg total) by mouth daily, Disp: 30 tablet, Rfl: 1    pantoprazole (PROTONIX) 40 mg tablet, Take 1 tablet (40 mg total) by mouth daily (Patient not taking: Reported on 9/30/2020), Disp: 90 tablet, Rfl: 0    varenicline (CHANTIX TAWANNA) 0 5 MG X 11 & 1 MG X 42 tablet, Take one 0 5mg tablet by mouth once daily for 3 days, then increase to one 0 5mg tablet twice daily for 3 days, then increase to one 1mg tablet twice daily  , Disp: 53 tablet, Rfl: 0    No Known Allergies    Objective:  Vitals:    09/08/21 1502   BP: 148/90   Pulse: 92       Body mass index is 35 48 kg/m²  Right Knee Exam     Muscle Strength   The patient has normal right knee strength  Tenderness   The patient is experiencing tenderness in the patella and lateral retinaculum      Range of Motion   Extension:  0 normal   Flexion:  120 normal     Tests   Karlo:  Medial - negative Lateral - negative  Varus: negative Valgus: negative  Drawer:  Anterior - negative Posterior - negative  Patellar apprehension: negative    Other   Erythema: absent  Scars: absent  Sensation: normal  Pulse: present  Swelling: none  Effusion: no effusion present    Comments:   Collateral ligaments stable at 0, 30, 90   Positive patellofemoral crepitus and patellofemoral grind   Tender to palpation proximal calf, lateral patellar facet, distal IT band   negative neuro straight leg raise   Normal sensation light touch in the L2 through S1 nerve distribution   Ambulates with a slightly antalgic gait on the right and no assistive device  Able to tolerate gentle active and passive range of motion of the right hip without pain          Observations     Right Knee   Negative for effusion  Physical Exam  Vitals and nursing note reviewed  Constitutional:       Appearance: She is well-developed  Comments: Body mass index is 35 48 kg/m²  HENT:      Head: Normocephalic and atraumatic  Right Ear: External ear normal       Left Ear: External ear normal    Cardiovascular:      Rate and Rhythm: Normal rate  Pulmonary:      Effort: Pulmonary effort is normal    Abdominal:      Palpations: Abdomen is soft  Musculoskeletal:      Cervical back: Normal range of motion  Right knee: No effusion  Instability Tests: Medial Karlo test negative and lateral Karlo test negative  Comments: See ortho exam   Skin:     General: Skin is warm and dry  Neurological:      Mental Status: She is alert and oriented to person, place, and time  Psychiatric:         Behavior: Behavior normal          Thought Content: Thought content normal          Judgment: Judgment normal        I have personally reviewed pertinent films in PACS of the x-rays taken previously of her right knee which demonstrate moderate degenerative changes with medial joint space narrowing and lateral tilt to the patella  There is narrowing of the patellofemoral compartment as well with marginal osteophytosis    There is no evidence of fracture, dislocation, or lytic blastic lesion

## 2021-10-26 ENCOUNTER — VBI (OUTPATIENT)
Dept: ADMINISTRATIVE | Facility: OTHER | Age: 56
End: 2021-10-26

## 2022-02-23 ENCOUNTER — OFFICE VISIT (OUTPATIENT)
Dept: FAMILY MEDICINE CLINIC | Facility: CLINIC | Age: 57
End: 2022-02-23
Payer: COMMERCIAL

## 2022-02-23 VITALS
TEMPERATURE: 99 F | HEIGHT: 62 IN | HEART RATE: 82 BPM | RESPIRATION RATE: 20 BRPM | WEIGHT: 197 LBS | DIASTOLIC BLOOD PRESSURE: 80 MMHG | BODY MASS INDEX: 36.25 KG/M2 | SYSTOLIC BLOOD PRESSURE: 150 MMHG

## 2022-02-23 DIAGNOSIS — I10 ESSENTIAL HYPERTENSION: Primary | ICD-10-CM

## 2022-02-23 PROCEDURE — 4010F ACE/ARB THERAPY RXD/TAKEN: CPT | Performed by: NURSE PRACTITIONER

## 2022-02-23 PROCEDURE — 4004F PT TOBACCO SCREEN RCVD TLK: CPT | Performed by: NURSE PRACTITIONER

## 2022-02-23 PROCEDURE — 3077F SYST BP >= 140 MM HG: CPT | Performed by: NURSE PRACTITIONER

## 2022-02-23 PROCEDURE — 3725F SCREEN DEPRESSION PERFORMED: CPT | Performed by: NURSE PRACTITIONER

## 2022-02-23 PROCEDURE — 3079F DIAST BP 80-89 MM HG: CPT | Performed by: NURSE PRACTITIONER

## 2022-02-23 PROCEDURE — 3008F BODY MASS INDEX DOCD: CPT | Performed by: NURSE PRACTITIONER

## 2022-02-23 PROCEDURE — 99213 OFFICE O/P EST LOW 20 MIN: CPT | Performed by: NURSE PRACTITIONER

## 2022-02-23 RX ORDER — LOSARTAN POTASSIUM 50 MG/1
50 TABLET ORAL DAILY
Qty: 30 TABLET | Refills: 1 | Status: SHIPPED | OUTPATIENT
Start: 2022-02-23 | End: 2022-03-28 | Stop reason: SDUPTHER

## 2022-02-23 NOTE — PROGRESS NOTES
Assessment/Plan:    Had labs drawn at her CPE for work yesterday, she will bring these to her f/u to review  1  Essential hypertension  Assessment & Plan: Will start on Losartan  Discussed diet modification, weight loss, and smoking cessation  RTO 1 month for BP check  Orders:  -     losartan (COZAAR) 50 mg tablet; Take 1 tablet (50 mg total) by mouth daily          There are no Patient Instructions on file for this visit  Return in about 4 weeks (around 3/23/2022)  Subjective:      Patient ID: Linnette Puckett is a 64 y o  female  Chief Complaint   Patient presents with    Blood Pressure Check     need for work  ac/lpn       Here today for BP check  It has been elevated for some time now and has not been treated  She denies any associated headaches, vision disturbances, chest pain, or SOB  She has a family history of HTN  She smokes 6 cigarettes per day and is vaping as well  She snores at night and feels fatigued  Reports she had a sleep study in the past which was normal     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) and BP was elevated  Was asked to come in today for evaluation        The following portions of the patient's history were reviewed and updated as appropriate: allergies, current medications, past family history, past medical history, past social history, past surgical history and problem list     Review of Systems   Constitutional: Negative  Respiratory: Negative  Cardiovascular: Negative  Gastrointestinal: Negative  Neurological: Negative  Current Outpatient Medications   Medication Sig Dispense Refill    losartan (COZAAR) 50 mg tablet Take 1 tablet (50 mg total) by mouth daily 30 tablet 1     No current facility-administered medications for this visit         Objective:    /80   Pulse 82   Temp 99 °F (37 2 °C)   Resp 20   Ht 5' 2" (1 575 m)   Wt 89 4 kg (197 lb)   BMI 36 03 kg/m²        Physical Exam  Vitals and nursing note reviewed  Constitutional:       Appearance: Normal appearance  She is well-developed  She is obese  Cardiovascular:      Rate and Rhythm: Normal rate and regular rhythm  Heart sounds: Normal heart sounds  No murmur heard  Pulmonary:      Effort: Pulmonary effort is normal       Breath sounds: Normal breath sounds  Skin:     General: Skin is warm and dry  Neurological:      Mental Status: She is alert     Psychiatric:         Mood and Affect: Mood normal          Behavior: Behavior normal                 DONTAE Garcia

## 2022-02-23 NOTE — ASSESSMENT & PLAN NOTE
Will start on Losartan  Discussed diet modification, weight loss, and smoking cessation  RTO 1 month for BP check

## 2022-02-23 NOTE — LETTER
February 23, 2022     Patient: Lynn Garcia   YOB: 1965   Date of Visit: 2/23/2022       To Whom it May Concern:    Lynn Garcia is under my professional care  She was seen in my office on 2/23/2022 for hypertension  She is being initiated on medication and will be returning in 1 month for follow up  She may work without restrictions  If you have any questions or concerns, please don't hesitate to call           Sincerely,          DONTAE Julien        CC: No Recipients

## 2022-03-21 ENCOUNTER — RA CDI HCC (OUTPATIENT)
Dept: OTHER | Facility: HOSPITAL | Age: 57
End: 2022-03-21

## 2022-03-21 NOTE — PROGRESS NOTES
[facility]  coding opportunities          Chart Reviewed number of suggestions sent to Provider: 1     Patients Insurance        Commercial Insurance: [address] [id number]     Please review the following Dx as per the active problem list:    E66 01 Morbid Obesity    If this is correct, please assess and document during your next visit, March 28

## 2022-03-28 ENCOUNTER — OFFICE VISIT (OUTPATIENT)
Dept: FAMILY MEDICINE CLINIC | Facility: CLINIC | Age: 57
End: 2022-03-28
Payer: COMMERCIAL

## 2022-03-28 VITALS
TEMPERATURE: 98 F | OXYGEN SATURATION: 98 % | BODY MASS INDEX: 36.44 KG/M2 | RESPIRATION RATE: 16 BRPM | HEIGHT: 62 IN | SYSTOLIC BLOOD PRESSURE: 104 MMHG | WEIGHT: 198 LBS | DIASTOLIC BLOOD PRESSURE: 66 MMHG | HEART RATE: 96 BPM

## 2022-03-28 DIAGNOSIS — N95.1 HOT FLASH, MENOPAUSAL: ICD-10-CM

## 2022-03-28 DIAGNOSIS — I10 ESSENTIAL HYPERTENSION: Primary | ICD-10-CM

## 2022-03-28 DIAGNOSIS — Z12.31 SCREENING MAMMOGRAM, ENCOUNTER FOR: ICD-10-CM

## 2022-03-28 PROBLEM — S92.014A CLOSED NONDISPLACED FRACTURE OF BODY OF RIGHT CALCANEUS: Status: RESOLVED | Noted: 2018-02-08 | Resolved: 2022-03-28

## 2022-03-28 PROBLEM — M77.42 METATARSALGIA OF BOTH FEET: Status: RESOLVED | Noted: 2018-10-10 | Resolved: 2022-03-28

## 2022-03-28 PROBLEM — M21.962 ACQUIRED DEFORMITY OF LEFT FOOT: Status: RESOLVED | Noted: 2018-04-26 | Resolved: 2022-03-28

## 2022-03-28 PROBLEM — M77.41 METATARSALGIA OF BOTH FEET: Status: RESOLVED | Noted: 2018-10-10 | Resolved: 2022-03-28

## 2022-03-28 PROBLEM — G89.29 CHRONIC PAIN OF BOTH FEET: Status: RESOLVED | Noted: 2018-10-10 | Resolved: 2022-03-28

## 2022-03-28 PROBLEM — M21.961 ACQUIRED DEFORMITY OF RIGHT FOOT: Status: RESOLVED | Noted: 2018-02-08 | Resolved: 2022-03-28

## 2022-03-28 PROBLEM — M77.32 HEEL SPUR, LEFT: Status: RESOLVED | Noted: 2018-04-03 | Resolved: 2022-03-28

## 2022-03-28 PROBLEM — M79.671 CHRONIC PAIN OF BOTH FEET: Status: RESOLVED | Noted: 2018-10-10 | Resolved: 2022-03-28

## 2022-03-28 PROBLEM — Q66.50 CONGENITAL PES PLANUS: Status: RESOLVED | Noted: 2018-03-15 | Resolved: 2022-03-28

## 2022-03-28 PROBLEM — M77.52 BURSITIS OF LEFT FOOT: Status: RESOLVED | Noted: 2018-04-03 | Resolved: 2022-03-28

## 2022-03-28 PROBLEM — M79.672 CHRONIC PAIN OF BOTH FEET: Status: RESOLVED | Noted: 2018-10-10 | Resolved: 2022-03-28

## 2022-03-28 PROBLEM — R26.2 DIFFICULTY WALKING: Status: RESOLVED | Noted: 2018-03-15 | Resolved: 2022-03-28

## 2022-03-28 PROBLEM — M72.2 PLANTAR FASCIITIS: Status: RESOLVED | Noted: 2018-02-08 | Resolved: 2022-03-28

## 2022-03-28 PROBLEM — M72.2 PLANTAR FASCIAL FIBROMATOSIS: Status: RESOLVED | Noted: 2018-03-28 | Resolved: 2022-03-28

## 2022-03-28 PROCEDURE — 3078F DIAST BP <80 MM HG: CPT | Performed by: FAMILY MEDICINE

## 2022-03-28 PROCEDURE — 4010F ACE/ARB THERAPY RXD/TAKEN: CPT | Performed by: FAMILY MEDICINE

## 2022-03-28 PROCEDURE — 99213 OFFICE O/P EST LOW 20 MIN: CPT | Performed by: FAMILY MEDICINE

## 2022-03-28 PROCEDURE — 3008F BODY MASS INDEX DOCD: CPT | Performed by: FAMILY MEDICINE

## 2022-03-28 PROCEDURE — 4004F PT TOBACCO SCREEN RCVD TLK: CPT | Performed by: FAMILY MEDICINE

## 2022-03-28 PROCEDURE — 3074F SYST BP LT 130 MM HG: CPT | Performed by: FAMILY MEDICINE

## 2022-03-28 RX ORDER — LOSARTAN POTASSIUM 50 MG/1
50 TABLET ORAL DAILY
Qty: 90 TABLET | Refills: 1 | Status: SHIPPED | OUTPATIENT
Start: 2022-03-28 | End: 2022-05-02 | Stop reason: SDUPTHER

## 2022-03-28 NOTE — PROGRESS NOTES
Assessment/Plan:    1  Essential hypertension  Assessment & Plan:  Much improved with losartan  Continue 50 mg a day    Orders:  -     CBC; Future  -     Comprehensive metabolic panel; Future  -     Lipid Panel with Direct LDL reflex; Future  -     TSH, 3rd generation; Future  -     losartan (COZAAR) 50 mg tablet; Take 1 tablet (50 mg total) by mouth daily  -     CBC  -     Comprehensive metabolic panel  -     Lipid Panel with Direct LDL reflex  -     TSH, 3rd generation    2  Hot flash, menopausal  -     ESTRADIOL, FREE SERUM; Future  -     ESTRADIOL, FREE SERUM    3  Screening mammogram, encounter for  -     Mammo screening bilateral w 3d & cad; Future; Expected date: 2022    BMI Counseling: Body mass index is 36 21 kg/m²  The BMI is above normal  Exercise recommendations include exercising 3-5 times per week  Rationale for BMI follow-up plan is due to patient being overweight or obese  There are no Patient Instructions on file for this visit  Return in about 6 months (around 2022) for Annual physical     Subjective:      Patient ID: Kishan Amaya is a 64 y o  female  Chief Complaint   Patient presents with    Follow-up     bp   mfcma       Her step mother  yesterday  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  She is taking the losartan daily  She is getting hot flashes and she is worried about her hormone levels  She has a very low libido  The following portions of the patient's history were reviewed and updated as appropriate: allergies, current medications, past family history, past medical history, past social history, past surgical history and problem list     Review of Systems      Current Outpatient Medications   Medication Sig Dispense Refill    losartan (COZAAR) 50 mg tablet Take 1 tablet (50 mg total) by mouth daily 90 tablet 1     No current facility-administered medications for this visit         Objective:    /66   Pulse 96   Temp 98 °F (36 7 °C)   Resp 16   Ht 5' 2" (1 575 m)   Wt 89 8 kg (198 lb)   SpO2 98%   BMI 36 21 kg/m²        Physical Exam  Vitals and nursing note reviewed  Constitutional:       Appearance: She is well-developed  HENT:      Head: Normocephalic and atraumatic  Right Ear: Tympanic membrane and external ear normal       Left Ear: Tympanic membrane and external ear normal    Cardiovascular:      Rate and Rhythm: Normal rate and regular rhythm  Heart sounds: Normal heart sounds  No murmur heard  No friction rub  Pulmonary:      Effort: Pulmonary effort is normal  No respiratory distress  Breath sounds: Normal breath sounds  No wheezing or rales  Musculoskeletal:      Right lower leg: No edema  Left lower leg: No edema                  Radha Men, DO

## 2022-05-02 DIAGNOSIS — I10 ESSENTIAL HYPERTENSION: ICD-10-CM

## 2022-05-02 RX ORDER — LOSARTAN POTASSIUM 50 MG/1
50 TABLET ORAL DAILY
Qty: 90 TABLET | Refills: 1 | Status: SHIPPED | OUTPATIENT
Start: 2022-05-02

## 2022-06-14 ENCOUNTER — OFFICE VISIT (OUTPATIENT)
Dept: FAMILY MEDICINE CLINIC | Facility: CLINIC | Age: 57
End: 2022-06-14
Payer: COMMERCIAL

## 2022-06-14 VITALS
HEART RATE: 92 BPM | TEMPERATURE: 97.6 F | HEIGHT: 62 IN | RESPIRATION RATE: 18 BRPM | WEIGHT: 189 LBS | DIASTOLIC BLOOD PRESSURE: 86 MMHG | BODY MASS INDEX: 34.78 KG/M2 | OXYGEN SATURATION: 98 % | SYSTOLIC BLOOD PRESSURE: 150 MMHG

## 2022-06-14 DIAGNOSIS — Z12.31 SCREENING MAMMOGRAM, ENCOUNTER FOR: ICD-10-CM

## 2022-06-14 DIAGNOSIS — J01.00 ACUTE MAXILLARY SINUSITIS, RECURRENCE NOT SPECIFIED: Primary | ICD-10-CM

## 2022-06-14 LAB — S PYO AG THROAT QL: NEGATIVE

## 2022-06-14 PROCEDURE — 4004F PT TOBACCO SCREEN RCVD TLK: CPT | Performed by: FAMILY MEDICINE

## 2022-06-14 PROCEDURE — 99213 OFFICE O/P EST LOW 20 MIN: CPT | Performed by: FAMILY MEDICINE

## 2022-06-14 PROCEDURE — 3008F BODY MASS INDEX DOCD: CPT | Performed by: FAMILY MEDICINE

## 2022-06-14 PROCEDURE — 87880 STREP A ASSAY W/OPTIC: CPT | Performed by: FAMILY MEDICINE

## 2022-06-14 PROCEDURE — 3077F SYST BP >= 140 MM HG: CPT | Performed by: FAMILY MEDICINE

## 2022-06-14 PROCEDURE — 3079F DIAST BP 80-89 MM HG: CPT | Performed by: FAMILY MEDICINE

## 2022-06-14 RX ORDER — AMOXICILLIN 875 MG/1
875 TABLET, COATED ORAL 2 TIMES DAILY
Qty: 14 TABLET | Refills: 0 | Status: SHIPPED | OUTPATIENT
Start: 2022-06-14 | End: 2022-06-21

## 2022-06-14 NOTE — PROGRESS NOTES
Assessment/Plan:    1  Acute maxillary sinusitis, recurrence not specified  -     POCT rapid strepA  -     amoxicillin (AMOXIL) 875 mg tablet; Take 1 tablet (875 mg total) by mouth 2 (two) times a day for 7 days    2  Screening mammogram, encounter for  -     Mammo screening bilateral w 3d & cad; Future; Expected date: 06/15/2022         There are no Patient Instructions on file for this visit  Return if symptoms worsen or fail to improve  Subjective:      Patient ID: Sarah Abernathy is a 62 y o  female  Chief Complaint   Patient presents with    Sore Throat     Sore, raw throat  Coughing up green mucus, ear pain  Negative covid done at [facility] yesterday  jma       She had COVID on May 23rd  Was able to go back to work and had improved  Now she started getting more sick again on the 10th  She has a bad sore throat  The following portions of the patient's history were reviewed and updated as appropriate:  past social history    Review of Systems   HENT: Positive for postnasal drip and sore throat  Current Outpatient Medications   Medication Sig Dispense Refill    amoxicillin (AMOXIL) 875 mg tablet Take 1 tablet (875 mg total) by mouth 2 (two) times a day for 7 days 14 tablet 0    losartan (COZAAR) 50 mg tablet Take 1 tablet (50 mg total) by mouth daily 90 tablet 1     No current facility-administered medications for this visit  Objective:    /86   Pulse 92   Temp 97 6 °F (36 4 °C)   Resp 18   Ht 5' 2" (1 575 m)   Wt 85 7 kg (189 lb)   SpO2 98%   BMI 34 57 kg/m²      Physical Exam  Vitals and nursing note reviewed  Constitutional:       Appearance: She is well-developed  HENT:      Head: Normocephalic and atraumatic  Right Ear: Tympanic membrane and external ear normal       Left Ear: Tympanic membrane and external ear normal       Mouth/Throat:      Pharynx: Oropharynx is clear  No pharyngeal swelling or oropharyngeal exudate     Cardiovascular: Rate and Rhythm: Normal rate and regular rhythm  Heart sounds: Normal heart sounds  No murmur heard  No friction rub  Pulmonary:      Effort: Pulmonary effort is normal  No respiratory distress  Breath sounds: Normal breath sounds  No wheezing or rales  Musculoskeletal:      Right lower leg: No edema  Left lower leg: No edema             Bilateral maxillary sinus tenderness      [facility], DO

## 2022-06-14 NOTE — LETTER
June 14, 2022     Patient: Heydi Mendoza  YOB: 1965  Date of Visit: 6/14/2022      To Whom it May Concern:    Heydi Mendoza is under my professional care  Darya was seen in my office on 6/14/2022  Please excuse from work 6/14/22-6/16/22  If you have any questions or concerns, please don't hesitate to call           Sincerely,          Cynthia Varma DO        CC: No Recipients

## 2022-06-17 ENCOUNTER — TELEPHONE (OUTPATIENT)
Dept: FAMILY MEDICINE CLINIC | Facility: CLINIC | Age: 57
End: 2022-06-17

## 2022-06-17 NOTE — TELEPHONE ENCOUNTER
Patient called and stated she needs her work note that Dr Leora Johns wrote this week to be changed  Her work is requesting that it be changed to say that she was out from 6/14- to today 6/17  This way she is able to return to work on Monday      Please call patient when note is ready

## 2022-06-22 LAB
ALBUMIN SERPL-MCNC: 4.2 G/DL (ref 3.8–4.9)
ALBUMIN/GLOB SERPL: 1.4 {RATIO} (ref 1.2–2.2)
ALP SERPL-CCNC: 131 IU/L (ref 44–121)
ALT SERPL-CCNC: 29 IU/L (ref 0–32)
AST SERPL-CCNC: 18 IU/L (ref 0–40)
BILIRUB SERPL-MCNC: 0.2 MG/DL (ref 0–1.2)
BUN SERPL-MCNC: 17 MG/DL (ref 6–24)
BUN/CREAT SERPL: 22 (ref 9–23)
CALCIUM SERPL-MCNC: 9.6 MG/DL (ref 8.7–10.2)
CHLORIDE SERPL-SCNC: 102 MMOL/L (ref 96–106)
CHOLEST SERPL-MCNC: 204 MG/DL (ref 100–199)
CO2 SERPL-SCNC: 23 MMOL/L (ref 20–29)
CREAT SERPL-MCNC: 0.77 MG/DL (ref 0.57–1)
EGFR: 90 ML/MIN/1.73
ERYTHROCYTE [DISTWIDTH] IN BLOOD BY AUTOMATED COUNT: 12.7 % (ref 11.7–15.4)
ESTRADIOL FREE MFR SERPL: 2.7 %
ESTRADIOL FREE SERPL-MCNC: 0.22 PG/ML
ESTRADIOL SERPL HS-MCNC: 8.2 PG/ML
GLOBULIN SER-MCNC: 3 G/DL (ref 1.5–4.5)
GLUCOSE SERPL-MCNC: 93 MG/DL (ref 65–99)
HCT VFR BLD AUTO: 42.5 % (ref 34–46.6)
HDLC SERPL-MCNC: 40 MG/DL
HGB BLD-MCNC: 14.4 G/DL (ref 11.1–15.9)
LDLC SERPL CALC-MCNC: 133 MG/DL (ref 0–99)
LDLC/HDLC SERPL: 3.3 RATIO (ref 0–3.2)
MCH RBC QN AUTO: 30.3 PG (ref 26.6–33)
MCHC RBC AUTO-ENTMCNC: 33.9 G/DL (ref 31.5–35.7)
MCV RBC AUTO: 89 FL (ref 79–97)
MICRODELETION SYND BLD/T FISH: NORMAL
PLATELET # BLD AUTO: 365 X10E3/UL (ref 150–450)
POTASSIUM SERPL-SCNC: 4.7 MMOL/L (ref 3.5–5.2)
PROT SERPL-MCNC: 7.2 G/DL (ref 6–8.5)
RBC # BLD AUTO: 4.76 X10E6/UL (ref 3.77–5.28)
SL AMB VLDL CHOLESTEROL CALC: 31 MG/DL (ref 5–40)
SODIUM SERPL-SCNC: 141 MMOL/L (ref 134–144)
TRIGL SERPL-MCNC: 172 MG/DL (ref 0–149)
TSH SERPL DL<=0.005 MIU/L-ACNC: 2.26 UIU/ML (ref 0.45–4.5)
WBC # BLD AUTO: 11.3 X10E3/UL (ref 3.4–10.8)

## 2022-06-28 ENCOUNTER — TELEPHONE (OUTPATIENT)
Dept: FAMILY MEDICINE CLINIC | Facility: CLINIC | Age: 57
End: 2022-06-28

## 2022-06-28 DIAGNOSIS — N95.9 MENOPAUSAL DISORDER: Primary | ICD-10-CM

## 2022-06-29 RX ORDER — ESTRADIOL 0.5 MG/1
TABLET ORAL
Qty: 30 TABLET | Refills: 3 | Status: SHIPPED | OUTPATIENT
Start: 2022-06-29 | End: 2022-08-01

## 2022-06-29 NOTE — TELEPHONE ENCOUNTER
6/29/2022 9:06 AM spoke with patient, she is getting over a sinus infection    We discussed her hormone levels that she is in menopause and would like hormone replacement  Risks and benefits of medication discussed  I asked her to schedule a follow up with me in a month to recheck her blood pressure and see if this is the right dose for her      Message complete  Debbie Mims, DO

## 2022-08-01 ENCOUNTER — OFFICE VISIT (OUTPATIENT)
Dept: FAMILY MEDICINE CLINIC | Facility: CLINIC | Age: 57
End: 2022-08-01
Payer: COMMERCIAL

## 2022-08-01 VITALS
DIASTOLIC BLOOD PRESSURE: 80 MMHG | HEART RATE: 82 BPM | RESPIRATION RATE: 20 BRPM | TEMPERATURE: 97.3 F | BODY MASS INDEX: 35.51 KG/M2 | HEIGHT: 62 IN | WEIGHT: 193 LBS | SYSTOLIC BLOOD PRESSURE: 146 MMHG | OXYGEN SATURATION: 97 %

## 2022-08-01 DIAGNOSIS — R79.89 ELEVATED TESTOSTERONE LEVEL IN FEMALE: ICD-10-CM

## 2022-08-01 DIAGNOSIS — I10 ESSENTIAL HYPERTENSION: ICD-10-CM

## 2022-08-01 DIAGNOSIS — L68.0 HIRSUTISM: ICD-10-CM

## 2022-08-01 DIAGNOSIS — R68.82 REDUCED LIBIDO: Primary | ICD-10-CM

## 2022-08-01 PROCEDURE — 3725F SCREEN DEPRESSION PERFORMED: CPT | Performed by: FAMILY MEDICINE

## 2022-08-01 PROCEDURE — 99214 OFFICE O/P EST MOD 30 MIN: CPT | Performed by: FAMILY MEDICINE

## 2022-08-01 PROCEDURE — 3077F SYST BP >= 140 MM HG: CPT | Performed by: FAMILY MEDICINE

## 2022-08-01 PROCEDURE — 3079F DIAST BP 80-89 MM HG: CPT | Performed by: FAMILY MEDICINE

## 2022-08-01 RX ORDER — SPIRONOLACTONE 25 MG/1
25 TABLET ORAL DAILY
Qty: 30 TABLET | Refills: 1 | Status: SHIPPED | OUTPATIENT
Start: 2022-08-01 | End: 2022-10-03 | Stop reason: SDUPTHER

## 2022-08-01 NOTE — PROGRESS NOTES
Assessment/Plan:    No problem-specific Assessment & Plan notes found for this encounter  Reduced libido, offer higher dose estrogen but as prempro since still has uterus    htn not controlled, add aldactone to losartan 50mg for anti-testosterone benefit with hirsutism and possible androgenic hair thinning       Diagnoses and all orders for this visit:    Reduced libido  -     estrogen, conjugated,-medroxyprogesterone (PREMPRO) 0 625-2 5 MG per tablet; Take 1 tablet by mouth daily    Hirsutism  -     spironolactone (ALDACTONE) 25 mg tablet; Take 1 tablet (25 mg total) by mouth daily    Essential hypertension  -     spironolactone (ALDACTONE) 25 mg tablet; Take 1 tablet (25 mg total) by mouth daily    Elevated testosterone level in female        Return in about 1 month (around 9/1/2022), or with Dr Bella Morrow  Subjective:      Patient ID: Bernie Benson is a 62 y o  female  Chief Complaint   Patient presents with    Follow-up     Discuss med changes  PER [place]  Trouble with libido, too little  Years  Affects relationship/marriage  HHA work  Still has a uterus  Done with periods    Has facial hair  Testosterone elevated in  2017  Shaves face daily    Thin hair noted by pt    Not depressed but history in past for depression    Been on estradiol 0 5mg for about 1m     The following portions of the patient's history were reviewed and updated as appropriate: allergies, current medications, past family history, past medical history, past social history, past surgical history and problem list     Review of Systems   Psychiatric/Behavioral: Negative for dysphoric mood  The patient is not nervous/anxious            Current Outpatient Medications   Medication Sig Dispense Refill    estrogen, conjugated,-medroxyprogesterone (PREMPRO) 0 625-2 5 MG per tablet Take 1 tablet by mouth daily 30 tablet 5    losartan (COZAAR) 50 mg tablet Take 1 tablet (50 mg total) by mouth daily 90 tablet 1    spironolactone (ALDACTONE) 25 mg tablet Take 1 tablet (25 mg total) by mouth daily 30 tablet 1     No current facility-administered medications for this visit  Objective:    /80   Pulse 82   Temp (!) 97 3 °F (36 3 °C)   Resp 20   Ht 5' 2" (1 575 m)   Wt 87 5 kg (193 lb)   SpO2 97%   BMI 35 30 kg/m²        Physical Exam  Vitals and nursing note reviewed  Constitutional:       Appearance: She is well-developed  She is obese  She is not ill-appearing  HENT:      Head: Normocephalic  Right Ear: Tympanic membrane normal       Left Ear: Tympanic membrane normal    Eyes:      General: No scleral icterus  Conjunctiva/sclera: Conjunctivae normal    Cardiovascular:      Rate and Rhythm: Normal rate and regular rhythm  Pulmonary:      Effort: Pulmonary effort is normal  No respiratory distress  Breath sounds: No wheezing  Abdominal:      Palpations: Abdomen is soft  Musculoskeletal:         General: No deformity  Cervical back: Neck supple  Right lower leg: No edema  Left lower leg: No edema  Skin:     General: Skin is warm and dry  Coloration: Skin is not pale  Comments: hirsut   Neurological:      Mental Status: She is alert  Psychiatric:         Behavior: Behavior normal          Thought Content:  Thought content normal                 Shira Kuo DO

## 2022-08-02 DIAGNOSIS — R68.82 REDUCED LIBIDO: Primary | ICD-10-CM

## 2022-08-02 DIAGNOSIS — R79.89 ELEVATED TESTOSTERONE LEVEL IN FEMALE: ICD-10-CM

## 2022-08-10 ENCOUNTER — VBI (OUTPATIENT)
Dept: ADMINISTRATIVE | Facility: OTHER | Age: 57
End: 2022-08-10

## 2022-09-26 ENCOUNTER — RA CDI HCC (OUTPATIENT)
Dept: OTHER | Facility: HOSPITAL | Age: 57
End: 2022-09-26

## 2022-09-26 NOTE — PROGRESS NOTES
[facility]  coding opportunities       Chart reviewed, no opportunity found: CHART REVIEWED, NO OPPORTUNITY FOUND        Patients Insurance        Commercial Insurance: Volodymyr Alcala

## 2022-10-03 ENCOUNTER — OFFICE VISIT (OUTPATIENT)
Dept: FAMILY MEDICINE CLINIC | Facility: CLINIC | Age: 57
End: 2022-10-03
Payer: COMMERCIAL

## 2022-10-03 VITALS
BODY MASS INDEX: 35.7 KG/M2 | HEART RATE: 98 BPM | HEIGHT: 62 IN | TEMPERATURE: 97.3 F | SYSTOLIC BLOOD PRESSURE: 134 MMHG | RESPIRATION RATE: 16 BRPM | WEIGHT: 194 LBS | DIASTOLIC BLOOD PRESSURE: 76 MMHG | OXYGEN SATURATION: 98 %

## 2022-10-03 DIAGNOSIS — Z00.00 ANNUAL PHYSICAL EXAM: Primary | ICD-10-CM

## 2022-10-03 DIAGNOSIS — R20.0 BILATERAL HAND NUMBNESS: ICD-10-CM

## 2022-10-03 DIAGNOSIS — Z23 NEED FOR VACCINATION: ICD-10-CM

## 2022-10-03 DIAGNOSIS — I10 ESSENTIAL HYPERTENSION: ICD-10-CM

## 2022-10-03 DIAGNOSIS — R68.82 REDUCED LIBIDO: ICD-10-CM

## 2022-10-03 DIAGNOSIS — L68.0 HIRSUTISM: ICD-10-CM

## 2022-10-03 DIAGNOSIS — R73.01 IMPAIRED FASTING GLUCOSE: ICD-10-CM

## 2022-10-03 DIAGNOSIS — N95.1 MENOPAUSAL VASOMOTOR SYNDROME: ICD-10-CM

## 2022-10-03 PROCEDURE — 99396 PREV VISIT EST AGE 40-64: CPT | Performed by: FAMILY MEDICINE

## 2022-10-03 PROCEDURE — 90471 IMMUNIZATION ADMIN: CPT

## 2022-10-03 PROCEDURE — 90682 RIV4 VACC RECOMBINANT DNA IM: CPT

## 2022-10-03 RX ORDER — SPIRONOLACTONE 25 MG/1
25 TABLET ORAL DAILY
Qty: 30 TABLET | Refills: 5 | Status: SHIPPED | OUTPATIENT
Start: 2022-10-03

## 2022-10-03 NOTE — PATIENT INSTRUCTIONS

## 2022-10-03 NOTE — PROGRESS NOTES
[id number]    NAME: Diana Alvarez  AGE: 62 y o  SEX: female  : 1965     DATE: 10/3/2022     Assessment and Plan:     Problem List Items Addressed This Visit     Essential hypertension     Blood pressure is controlled with just aldactone  She hasn't been taking the losartan  Losartan taken off of medication list           Relevant Medications    spironolactone (ALDACTONE) 25 mg tablet    Other Relevant Orders    CBC    Comprehensive metabolic panel    Lipid Panel with Direct LDL reflex    Hirsutism    Relevant Medications    spironolactone (ALDACTONE) 25 mg tablet    Impaired fasting glucose    Relevant Orders    Hemoglobin A1C    Menopausal vasomotor syndrome     Still having some hot flashes           Relevant Orders    ESTRADIOL, FREE SERUM    Reduced libido    Relevant Medications    estrogen, conjugated,-medroxyprogesterone (PREMPRO) 0 625-2 5 MG per tablet      Other Visit Diagnoses     Annual physical exam    -  Primary    Bilateral hand numbness        will follow up with neurologist     Need for vaccination        Relevant Orders    influenza vaccine, quadrivalent, recombinant, PF, 0 5 mL, for patients 18 yr+ (FLUBLOK) (Completed)          Immunizations and preventive care screenings were discussed with patient today  Appropriate education was printed on patient's after visit summary  Counseling:  Dental Health: discussed importance of regular tooth brushing, flossing, and dental visits  Exercise: the importance of regular exercise/physical activity was discussed  Recommend exercise 3-5 times per week for at least 30 minutes  Reminded to schedule mammogram        Return in about 3 months (around 1/3/2023) for Next scheduled follow up       Chief Complaint:     Chief Complaint   Patient presents with    Physical Exam     Robyn Jay        History of Present Illness:     Adult Annual Physical   Patient here for a comprehensive physical exam  The patient reports that she was recently went to the ER  They couldn't find out what was wrong  She has an appointment on Oct  17 with neurology and has a stress test scheduled  She has a feeling of nausea when she raises her arm  Diet and Physical Activity  Diet/Nutrition: well balanced diet  Exercise: no formal exercise  Depression Screening  PHQ-2/9 Depression Screening    Little interest or pleasure in doing things: 0 - not at all  Feeling down, depressed, or hopeless: 0 - not at all  PHQ-2 Score: 0  PHQ-2 Interpretation: Negative depression screen       General Health  Sleep: sleeps poorly  Hearing: normal - bilateral   Vision: no vision problems  Dental: no dental visits for >1 year         /GYN Health  Patient is: postmenopausal       Review of Systems:     Review of Systems   Past Medical History:     Past Medical History:   Diagnosis Date    Apnea 01/22/2009    Arthralgia     last assessed 6/28/13    Depression 10/09/2006    Dysmenorrhea 01/22/2009    Essential hypertension 3/8/2018    Exposure to potentially hazardous body fluids     last assessed 6/1/16    Insomnia 10/09/2006    Kidney stone     20 years ago    Muscle weakness (generalized) 08/17/2006    Obesity     Viral gastroenteritis     last assessed 6/13/13      Past Surgical History:     Past Surgical History:   Procedure Laterality Date    APPENDECTOMY      last assessed 5/1/17    PLANTAR FASCIA SURGERY Right 3/30/2018    Procedure: Resection heel spur;  Surgeon: Georgena Saint, DPM;  Location: [address] [place] [address];  Service: Podiatry    PLANTAR FASCIA SURGERY Left 5/4/2018    Procedure: RELEASE FASCIA PLANTAR/FASCIOTOMY;  Surgeon: Georgena Saint, DPM;  Location: [address] [place] [address];  Service: Podiatry    DC REMOVAL OF HEEL SPUR  5/4/2018    Procedure: Heel spur resection with partial plantar fasciectomy;  Surgeon: Georgena Saint, DPM;  Location: [facility];  Service: Podiatry    SALPINGECTOMY      had a tubal pregnancy    SALPINGOSTOMY      TONSILLECTOMY        Social History:     Social History     Socioeconomic History    Marital status: /Civil Union     Spouse name: None    Number of children: None    Years of education: None    Highest education level: None   Occupational History    None   Tobacco Use    Smoking status: Former Smoker     Packs/day: 0 50     Years: 30 00     Pack years: 15 00     Types: Cigarettes     Quit date: 2022     Years since quittin 2    Smokeless tobacco: Never Used   Vaping Use    Vaping Use: Every day    Substances: Nicotine   Substance and Sexual Activity    Alcohol use: Yes     Comment: OCCASIONALLY     Drug use: No    Sexual activity: None   Other Topics Concern    None   Social History Narrative    None     Social Determinants of Health     Financial Resource Strain: Not on file   Food Insecurity: Not on file   Transportation Needs: Not on file   Physical Activity: Not on file   Stress: Not on file   Social Connections: Not on file   Intimate Partner Violence: Not on file   Housing Stability: Not on file      Family History:     Family History   Problem Relation Age of Onset    Diabetes Mother     Fibromyalgia Mother     Hypertension Father     Hyperthyroidism Father     Hypothyroidism Father     Stroke Maternal Grandfather     Mental illness Neg Hx       Current Medications:     Current Outpatient Medications   Medication Sig Dispense Refill    estrogen, conjugated,-medroxyprogesterone (PREMPRO) 0 625-2 5 MG per tablet Take 1 tablet by mouth daily 30 tablet 5    spironolactone (ALDACTONE) 25 mg tablet Take 1 tablet (25 mg total) by mouth daily 30 tablet 5     No current facility-administered medications for this visit        Allergies:     No Known Allergies   Physical Exam:     /76   Pulse 98   Temp (!) 97 3 °F (36 3 °C)   Resp 16   Ht 5' 2" (1 575 m)   Wt 88 kg (194 lb)   SpO2 98%   BMI 35 48 kg/m²     Physical Exam  Vitals and nursing note reviewed  Constitutional:       Appearance: She is well-developed  HENT:      Head: Normocephalic and atraumatic  Right Ear: External ear normal       Left Ear: External ear normal       Nose: Nose normal    Cardiovascular:      Rate and Rhythm: Normal rate and regular rhythm  Heart sounds: Normal heart sounds  No murmur heard  No friction rub  Pulmonary:      Effort: No respiratory distress  Breath sounds: Normal breath sounds  No wheezing or rales  Abdominal:      Palpations: Abdomen is soft  Tenderness: There is no abdominal tenderness  Musculoskeletal:      Right lower leg: No edema  Left lower leg: No edema  Neurological:      Mental Status: She is oriented to person, place, and time  Cranial Nerves: No cranial nerve deficit           Visual Acuity Screening    Right eye Left eye Both eyes   Without correction: 20/25 20/20 20/13   With correction:            Richelle Treadwell, [address]

## 2022-10-03 NOTE — ASSESSMENT & PLAN NOTE
Blood pressure is controlled with just aldactone  She hasn't been taking the losartan  Losartan taken off of medication list

## 2022-10-22 LAB
ESTRADIOL FREE MFR SERPL: 1.9 %
ESTRADIOL FREE SERPL-MCNC: 0.34 PG/ML
ESTRADIOL SERPL HS-MCNC: 18 PG/ML

## 2022-12-05 ENCOUNTER — TELEPHONE (OUTPATIENT)
Dept: FAMILY MEDICINE CLINIC | Facility: CLINIC | Age: 57
End: 2022-12-05

## 2022-12-05 NOTE — TELEPHONE ENCOUNTER
12/5/2022 10:45 AM spoke with her neurologist   She is been to him a couple times complaining of multiple areas of neck and back pain  He has been having a hard time getting MRIs approved by her insurance  She needs to complete her 6 weeks of physical therapy before they will cover it  He does not think her pain is related to her brain  He thinks it is more a musculoskeletal issue  Also thinks a rheumatologist would be a good idea  I let him know that I will be following up with the patient later this month and will discuss his findings with her      Richelle Treadwell, DO

## 2022-12-06 ENCOUNTER — TELEPHONE (OUTPATIENT)
Dept: FAMILY MEDICINE CLINIC | Facility: CLINIC | Age: 57
End: 2022-12-06

## 2022-12-06 NOTE — TELEPHONE ENCOUNTER
----- Message from Param Barrett DO sent at 12/6/2022 10:00 AM EST -----  Regarding: FW: Dec 23 new appointment   Contact: [phone]  Please call patient to schedule either with me or Dr Eloy Sharma  ----- Message -----  From: Jaqui Valera MA  Sent: 12/6/2022   9:57 AM EST  To: Param Barrett DO  Subject: FW: Dec 23 new appointment                       Please forward to clerical if you are okay with her seeing another provider  Jaqui Valera MA    ----- Message -----  From: Buffy Alcantara  Sent: 12/5/2022   8:14 PM EST  To: [facility]  Subject: Dec 23 new appointment                           I called right this morning & ask if I could see Dr Misbah Akins the office said they would call me back & never heard from them ! I would like to know for my own clarification are you answering for Dr Henry Roblero or is she personally seeing them and dictating what's being said?       Please let me know also when I asked for help with this matter before noon time with  personally why at [address] still waiting on an answer for another doctor

## 2022-12-07 ENCOUNTER — TELEPHONE (OUTPATIENT)
Dept: FAMILY MEDICINE CLINIC | Facility: CLINIC | Age: 57
End: 2022-12-07

## 2022-12-07 ENCOUNTER — OFFICE VISIT (OUTPATIENT)
Dept: FAMILY MEDICINE CLINIC | Facility: CLINIC | Age: 57
End: 2022-12-07

## 2022-12-07 VITALS
HEART RATE: 80 BPM | RESPIRATION RATE: 17 BRPM | HEIGHT: 62 IN | DIASTOLIC BLOOD PRESSURE: 82 MMHG | WEIGHT: 199.4 LBS | TEMPERATURE: 98.5 F | BODY MASS INDEX: 36.7 KG/M2 | SYSTOLIC BLOOD PRESSURE: 144 MMHG

## 2022-12-07 DIAGNOSIS — M79.10 MYALGIA: Primary | ICD-10-CM

## 2022-12-07 DIAGNOSIS — I10 ESSENTIAL HYPERTENSION: ICD-10-CM

## 2022-12-07 DIAGNOSIS — L68.0 HIRSUTISM: ICD-10-CM

## 2022-12-07 RX ORDER — DULOXETIN HYDROCHLORIDE 30 MG/1
30 CAPSULE, DELAYED RELEASE ORAL DAILY
Qty: 30 CAPSULE | Refills: 1 | Status: SHIPPED | OUTPATIENT
Start: 2022-12-07

## 2022-12-07 RX ORDER — LOSARTAN POTASSIUM 50 MG/1
TABLET ORAL DAILY
COMMUNITY
End: 2022-12-09 | Stop reason: SDUPTHER

## 2022-12-07 NOTE — PROGRESS NOTES
Assessment/Plan:    No problem-specific Assessment & Plan notes found for this encounter  myalgias and arthralgias  Reviewed correspondence from her PCP Dr Kobe Wright with pt and   She should keep f/u with her rheumatologist to go over test results    They voiced frustration with symptoms and lack of diagnosis to date  They are aware of intervention plan today as written out for them, safe use of tylenol, risks of nsaids, purpose of duloxetine  Pending rheumatologist's opinion, she may have fibromyalgia or PMR     verbally loud, cursing, frustrated and demands she get disability "since that is why we made the appointment"    I explained to him that disability request was not stated a reason for appointment and determination of disability would be left to the specialist if the tbd condition is not adequately treatable  Advised that I do not do disability and they criticized the way their situation was handled, namely their requests for advice were not responded to timely, that they were not sure if their PCP was responding or someone else was  I suggested they leave a message with office staff for their pcp Dr Kobe Wright to call them back to provide further guidance at their request    angrily left the room at the end of the visit though he was asked if he had any further questions since was causing a scene  Hirsutism stable  htn fair control    41 minutes spent with patient and with chart review and documentation completion  Diagnoses and all orders for this visit:    Myalgia  -     DULoxetine (Cymbalta) 30 mg delayed release capsule; Take 1 capsule (30 mg total) by mouth daily    Essential hypertension    Hirsutism    Other orders  -     losartan (COZAAR) 50 mg tablet; in the morning        Return if symptoms worsen or fail to improve, for with your pcp Dr Kobe Wright  Subjective:      Patient ID: Kishan Amaya is a 62 y o  female      Chief Complaint   Patient presents with   • Pain     Go over history of pain nm lpn       HPI  Saw neurologist  Also cardiologist  Saw a rheumatologist, lot of labs done  Results pending and has f/u appt soon to discuss the results    PT currently, about 5 weeks  In a lot of pain per pt  Hands swollen  Thumbs hurt  Left lumbar also  Many body parts hurt    occas tylenol  Has tried aleve    Gabapentin in the past  Made her lightheaded    cymbalta in past for depression  Helped  Stopped on own after about 1-2y    The following portions of the patient's history were reviewed and updated as appropriate: allergies, current medications, past family history, past medical history, past social history, past surgical history and problem list     Review of Systems   Constitutional: Negative for fever  Musculoskeletal: Positive for arthralgias and myalgias  Current Outpatient Medications   Medication Sig Dispense Refill   • DULoxetine (Cymbalta) 30 mg delayed release capsule Take 1 capsule (30 mg total) by mouth daily 30 capsule 1   • estrogen, conjugated,-medroxyprogesterone (PREMPRO) 0 625-2 5 MG per tablet Take 1 tablet by mouth daily 30 tablet 5   • losartan (COZAAR) 50 mg tablet in the morning     • spironolactone (ALDACTONE) 25 mg tablet Take 1 tablet (25 mg total) by mouth daily 30 tablet 5     No current facility-administered medications for this visit  Objective:    /82   Pulse 80   Temp 98 5 °F (36 9 °C)   Resp 17   Ht 5' 2" (1 575 m)   Wt 90 4 kg (199 lb 6 4 oz)   BMI 36 47 kg/m²        Physical Exam  Vitals and nursing note reviewed  Constitutional:       General: She is not in acute distress  Appearance: She is well-developed  HENT:      Head: Normocephalic  Right Ear: Tympanic membrane normal       Left Ear: Tympanic membrane normal    Eyes:      General: No scleral icterus  Conjunctiva/sclera: Conjunctivae normal    Cardiovascular:      Rate and Rhythm: Normal rate and regular rhythm     Pulmonary:      Effort: Pulmonary effort is normal  No respiratory distress  Abdominal:      Palpations: Abdomen is soft  Musculoskeletal:         General: No deformity  Cervical back: Neck supple  Skin:     General: Skin is warm and dry  Neurological:      Mental Status: She is alert        Gait: Gait normal    Psychiatric:         Behavior: Behavior normal                 Brie Tompkins DO

## 2022-12-07 NOTE — TELEPHONE ENCOUNTER
Dr Yvonne Becerril,  I spoke with patient, she was seen today by Dr Leonel Malave but she is complaining of whole body pain and would like to see you  We scheduled her for 30 min with you on Friday 12/7  If you would rather a 15 min appointment with her, please let me know so I can reschedule    Thank You

## 2022-12-07 NOTE — TELEPHONE ENCOUNTER
----- Message from Terence Lloyd sent at 12/7/2022  4:31 PM EST -----  Regarding: Dec 23 new appointment   Contact: [phone]  Can you call me regarding how to go forward with doctors  No one seems to be helping and I'm in the a lot of pain! Tomorrow 1:30pm I'm going back in to the Rheumatology he took like 5 tubes of blood to look for something! I'm getting frustrated with everything! Still waiting for insurance company to approve MRI   Even seeing Dr Negro Haynes he abhishek refer me back to you!   Just don't know if what to do anymore  Thanks

## 2022-12-07 NOTE — PATIENT INSTRUCTIONS
Acetaminophen (found in tylenol) can safely be taken up to 4000mg per day from all sources  In addition, an option for pain can be duloxetine (cymbalta) which has other medically approved indications

## 2022-12-09 ENCOUNTER — OFFICE VISIT (OUTPATIENT)
Dept: FAMILY MEDICINE CLINIC | Facility: CLINIC | Age: 57
End: 2022-12-09

## 2022-12-09 VITALS
HEART RATE: 74 BPM | RESPIRATION RATE: 18 BRPM | DIASTOLIC BLOOD PRESSURE: 90 MMHG | SYSTOLIC BLOOD PRESSURE: 146 MMHG | TEMPERATURE: 97.9 F

## 2022-12-09 DIAGNOSIS — M54.16 LUMBAR RADICULOPATHY: ICD-10-CM

## 2022-12-09 DIAGNOSIS — I10 ESSENTIAL HYPERTENSION: ICD-10-CM

## 2022-12-09 DIAGNOSIS — M54.12 CERVICAL RADICULOPATHY: Primary | ICD-10-CM

## 2022-12-09 RX ORDER — LOSARTAN POTASSIUM 50 MG/1
50 TABLET ORAL DAILY
Qty: 90 TABLET | Refills: 1 | Status: SHIPPED | OUTPATIENT
Start: 2022-12-09

## 2022-12-09 NOTE — PROGRESS NOTES
Name: Bipin Bar      : 1965      MRN: [id number]  Encounter Provider: Cora Hickey DO  Encounter Date: 2022   Encounter department: [address] [place] [address]     1  Cervical radiculopathy  Comments:  Continued radicular signs bilaterally  Patient has failed 6 weeks of physical therapy  She had a cervical spine x-ray done at the spine specialist that did show spurring  Patient requires MRI of her cervical spine to evaluate for disc disease  Orders:  -     MRI cervical spine wo contrast; Future; Expected date: 2022    2  Lumbar radiculopathy  Assessment & Plan:  Pain not controlled  She is not improving with physical therapy  Patient has MRI of her lumbar spine scheduled on December 12      3  Essential hypertension  Assessment & Plan:  Not controlled  Patient has not been taking her losartan  Losartan 50 mg daily restarted    Orders:  -     losartan (COZAAR) 50 mg tablet; Take 1 tablet (50 mg total) by mouth in the morning      Return for Scheduled follow up in January   Subjective      The pain started on her right lower back  The pain started in August and started on the right and then extended to the left  She didn't stay overnight  She has been going to therapy 3 times a week and her MRI of the lumbar spine on      she is also having recurrent pain in her hands bilaterally  It is positional in nature  When she drives she constantly has to move her arm around on the steering wheel because a position will bother her  The pain is associated with nausea      Review of Systems    Current Outpatient Medications on File Prior to Visit   Medication Sig   • DULoxetine (Cymbalta) 30 mg delayed release capsule Take 1 capsule (30 mg total) by mouth daily   • estrogen, conjugated,-medroxyprogesterone (PREMPRO) 0 625-2 5 MG per tablet Take 1 tablet by mouth daily   • spironolactone (ALDACTONE) 25 mg tablet Take 1 tablet (25 mg total) by mouth daily   • [DISCONTINUED] losartan (COZAAR) 50 mg tablet in the morning       Objective     /90   Pulse 74   Temp 97 9 °F (36 6 °C)   Resp 18     Physical Exam  Vitals and nursing note reviewed  Constitutional:       Appearance: She is well-developed  HENT:      Head: Normocephalic and atraumatic  Right Ear: Tympanic membrane and external ear normal       Left Ear: Tympanic membrane and external ear normal    Cardiovascular:      Rate and Rhythm: Normal rate and regular rhythm  Heart sounds: Normal heart sounds  No murmur heard  No friction rub  Pulmonary:      Effort: Pulmonary effort is normal  No respiratory distress  Breath sounds: Normal breath sounds  No wheezing or rales  Musculoskeletal:         General: Tenderness ( Right lower back) present  Right lower leg: No edema  Left lower leg: No edema     Neurological:      Comments: Dizzy with laying flat, no nystagmus       Bret Katie, DO

## 2022-12-09 NOTE — ASSESSMENT & PLAN NOTE
Pain not controlled  She is not improving with physical therapy  Patient has MRI of her lumbar spine scheduled on December 12

## 2023-01-04 DIAGNOSIS — M54.12 CERVICAL RADICULOPATHY: Primary | ICD-10-CM

## 2023-01-05 ENCOUNTER — RA CDI HCC (OUTPATIENT)
Dept: OTHER | Facility: HOSPITAL | Age: 58
End: 2023-01-05

## 2023-01-05 NOTE — PROGRESS NOTES
[facility] coding opportunities          Chart Reviewed number of suggestions sent to Provider: 1     Patients Insurance        Commercial Insurance: [address] [id number]     [id number]

## 2023-01-09 ENCOUNTER — OFFICE VISIT (OUTPATIENT)
Dept: FAMILY MEDICINE CLINIC | Facility: CLINIC | Age: 58
End: 2023-01-09

## 2023-01-09 VITALS
RESPIRATION RATE: 16 BRPM | HEIGHT: 62 IN | DIASTOLIC BLOOD PRESSURE: 70 MMHG | HEART RATE: 103 BPM | WEIGHT: 197 LBS | SYSTOLIC BLOOD PRESSURE: 118 MMHG | OXYGEN SATURATION: 96 % | TEMPERATURE: 97.8 F | BODY MASS INDEX: 36.25 KG/M2

## 2023-01-09 DIAGNOSIS — M54.16 RADICULOPATHY OF LUMBAR REGION: Primary | ICD-10-CM

## 2023-01-09 DIAGNOSIS — M54.12 CERVICAL RADICULOPATHY: ICD-10-CM

## 2023-01-09 DIAGNOSIS — N95.1 MENOPAUSAL VASOMOTOR SYNDROME: ICD-10-CM

## 2023-01-09 NOTE — PROGRESS NOTES
Name: Bernie Benson      : 1965      MRN: [id number]  Encounter Provider: Janine Tubbs DO  Encounter Date: 2023   Encounter department: [address] [place] [address]     1  Radiculopathy of lumbar region  -     Ambulatory referral to Physical Therapy; Future    2  Cervical radiculopathy  -     Ambulatory referral to Physical Therapy; Future    3  Menopausal vasomotor syndrome  Assessment & Plan:  Improved with prempro      Return in about 6 weeks (around 2023) for Next scheduled follow up  Agree with short term disability 23-3/6/23    Continued pain in back and hand numbness       Subjective      She is still having significant back pain  The pain in mostly on her left lower back  Her hands are still numb  She has not been able to work  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  It doesn't feel safe to her  She has an appointment with physical therapy tomorrow  Review of Systems    Current Outpatient Medications on File Prior to Visit   Medication Sig   • DULoxetine (Cymbalta) 30 mg delayed release capsule Take 1 capsule (30 mg total) by mouth daily   • estrogen, conjugated,-medroxyprogesterone (PREMPRO) 0 625-2 5 MG per tablet Take 1 tablet by mouth daily   • losartan (COZAAR) 50 mg tablet Take 1 tablet (50 mg total) by mouth in the morning   • spironolactone (ALDACTONE) 25 mg tablet Take 1 tablet (25 mg total) by mouth daily       Objective     /70   Pulse 103   Temp 97 8 °F (36 6 °C)   Resp 16   Ht 5' 2" (1 575 m)   Wt 89 4 kg (197 lb)   SpO2 96%   BMI 36 03 kg/m²     Physical Exam  Vitals and nursing note reviewed  Constitutional:       Appearance: She is well-developed  HENT:      Head: Normocephalic and atraumatic  Right Ear: Tympanic membrane and external ear normal       Left Ear: Tympanic membrane and external ear normal    Cardiovascular:      Rate and Rhythm: Normal rate and regular rhythm        Heart sounds: Normal heart sounds  No murmur heard  No friction rub  Pulmonary:      Effort: Pulmonary effort is normal  No respiratory distress  Breath sounds: Normal breath sounds  No wheezing or rales  Musculoskeletal:         General: Tenderness (left lower back) present  Right lower leg: No edema  Left lower leg: No edema         Gabriela Sullivan DO

## 2023-01-10 ENCOUNTER — EVALUATION (OUTPATIENT)
Dept: PHYSICAL THERAPY | Facility: CLINIC | Age: 58
End: 2023-01-10

## 2023-01-10 DIAGNOSIS — M54.12 CERVICAL RADICULOPATHY: ICD-10-CM

## 2023-01-10 NOTE — PROGRESS NOTES
PT Evaluation     Today's date: 1/10/2023  Patient name: Derrell Jimenes  : 1965  MRN: [id number]  Referring provider: Nikolay Abernathy DO  Dx:   Encounter Diagnosis     ICD-10-CM    1  Cervical radiculopathy  M54 12 Ambulatory referral to Physical Therapy                     Assessment  Assessment details: Darya Bishop with signs and symptoms consistent with Cervical radiculopathy, with loss of range of motion, strength and spinal stabilization  Presents with high reactivity  Derrell Jimenes would benefit with physical therapy to address these impairments to return to prior level of function  Impairments: abnormal or restricted ROM, activity intolerance, impaired physical strength and pain with function    Goals  STG  Initiate HEP  Decrease pain by 50% in 3 weeks  Patient performing HEP 50% of time in 3 weeks  LTG  Independent with HEP  Decrease pain by 90% in 6 weeks  Patient performing HEP 90% of time in 6 weeks  FOTO > 45    in 6 weeks    Plan  Planned therapy interventions: joint mobilization, manual therapy, neuromuscular re-education, patient education, strengthening, stretching, therapeutic exercise and home exercise program  Frequency: 2x week  Duration in visits: 12  Duration in weeks: 6  Treatment plan discussed with: patient        Subjective Evaluation    History of Present Illness  Mechanism of injury: Patient reports that dizziness, nausea and numbness bilateral hands with raising her arms to the front or side,  She limits her driving due to these symptoms  This stated in 2022, following a long car drive  She developed numbness in both legs and went to ER, was not admitted  She went to PT at different facility: 10/2022-2022  For the cervical spine  This did not help  She also reports left sided low back pain  The back pain disturbs her sleep             Recurrent probem    Quality of life: good    Pain  Current pain ratin  At best pain ratin  At worst pain rating: 10  Location: neck-low back  Quality: dull ache and needle-like  Relieving factors: change in position and medications  Aggravating factors: lifting, sitting, walking, stair climbing and overhead activity  Progression: worsening    Treatments  Current treatment: physical therapy  Patient Goals  Patient goals for therapy: decreased pain, improved balance, increased motion, increased strength, independence with ADLs/IADLs, return to sport/leisure activities and return to work          Objective     Concurrent Complaints  Positive for dizziness, headaches and nausea/motion sickness   Negative for night pain, disturbed sleep, bladder dysfunction and bowel dysfunction    Postural Observations  Seated posture: poor  Standing posture: poor  Correction of posture: makes symptoms better        Active Range of Motion   Cervical/Thoracic Spine       Cervical    Flexion: 90 degrees   Extension: 50 degrees      Left rotation: 90 degrees  Right rotation: 90 degrees           Lumbar   Flexion: 60 degrees   Extension: 25 degrees   Left rotation: 80 degrees   Right rotation: 80 degrees     Strength/Myotome Testing   Cervical Spine     Left   Neck lateral flexion (C3): 5    Right   Neck lateral flexion (C3): 5    Left Shoulder     Planes of Motion   Abduction: 5   External rotation at 0°: 5     Right Shoulder     Planes of Motion   Abduction: 5   External rotation at 0°: 5     Left Elbow   Flexion: 5  Extension: 5    Right Elbow   Flexion: 5  Extension: 5    Left Wrist/Hand   Wrist extension: 5  Wrist flexion: 5  Thumb extension: 5    Right Wrist/Hand   Wrist extension: 5  Wrist flexion: 5  Thumb extension: 5    Lumbar   Left   Heel walk: normal  Toe walk: normal    Right   Heel walk: normal  Toe walk: normal    Left Knee   Flexion: 5  Extension: 5    Right Knee   Flexion: 5  Extension: 5    Left Ankle/Foot   Dorsiflexion: 5  Plantar flexion: 5  Great toe extension: 5    Right Ankle/Foot   Dorsiflexion: 5  Plantar flexion: 5  Great toe extension: 5  Neuro Exam:     Headaches   Patient reports headaches: Yes         Flowsheet Rows    Flowsheet Row Most Recent Value   PT/OT G-Codes    Current Score 20   Projected Score 45   FOTO information reviewed Yes   Assessment Type Evaluation             Precautions: Medical History    Diagnosis Date Comment Source   Apnea 01/22/2009     Arthralgia  last assessed 6/28/13    Depression 10/09/2006     Dysmenorrhea 01/22/2009     Essential hypertension 3/8/2018     Exposure to potentially hazardous body fluids  last assessed 6/1/16    Insomnia 10/09/2006     Kidney stone  20 years ago    Muscle weakness (generalized) 08/17/2006     Obesity      Viral gastroenteritis            Manuals                                                                 Neuro Re-Ed                                                                                                        Ther Ex                                                                                                                     Ther Activity                                       Gait Training                                       Modalities

## 2023-01-13 ENCOUNTER — OFFICE VISIT (OUTPATIENT)
Dept: PHYSICAL THERAPY | Facility: CLINIC | Age: 58
End: 2023-01-13

## 2023-01-13 ENCOUNTER — TELEPHONE (OUTPATIENT)
Dept: FAMILY MEDICINE CLINIC | Facility: CLINIC | Age: 58
End: 2023-01-13

## 2023-01-13 DIAGNOSIS — M54.12 CERVICAL RADICULOPATHY: Primary | ICD-10-CM

## 2023-01-13 DIAGNOSIS — M54.16 LUMBAR RADICULOPATHY: ICD-10-CM

## 2023-01-13 RX ORDER — PREDNISONE 20 MG/1
40 TABLET ORAL DAILY
Qty: 10 TABLET | Refills: 0 | Status: SHIPPED | OUTPATIENT
Start: 2023-01-13 | End: 2023-01-18

## 2023-01-13 NOTE — PROGRESS NOTES
Daily Note     Today's date: 2023  Patient name: Lynn Garcia  : 1965  MRN: [id number]  Referring provider: Gurdeep Nguyen DO  Dx:   Encounter Diagnosis     ICD-10-CM    1  Cervical radiculopathy  M54 12       2  Lumbar radiculopathy  M54 16                      Subjective: I had acupuncture yesterday, I have severe pain pre-PT  Patient reports "feeling much better, after [place]"      Objective: See treatment diary below      Assessment: Tolerated treatment well  Patient demonstrated fatigue post treatment and exhibited good technique with therapeutic exercises  Goal: Neurac Testing 10/30 in 6 weeks    Plan: Continue per plan of care        Precautions: Medical History    Diagnosis Date Comment Source   Apnea 2009     Arthralgia  last assessed 13    Depression 10/09/2006     Dysmenorrhea 2009     Essential hypertension 3/8/2018     Exposure to potentially hazardous body fluids  last assessed 16    Insomnia 10/09/2006     Kidney stone  20 years ago    Muscle weakness (generalized) 2006     Obesity      Viral gastroenteritis            Manuals                                                                 Neuro Re-Ed             Neurac supine pelvic lift 2x5            Neurac Bridge 2x5            Neurac SDLY Hip ADD/ABD 2x5  2x5            Neurac prone plank 2x5            Neurac Cerv retract supine 2x5            Neurac scap retract supine 2x5            Neurac scap retract w depression sit 2x5                                                                                                                                 Ther Activity                                       Gait Training                                       Modalities                                               Myofascial Chain Tests   Tests Left Side Right Side Comments Total   Supine Pelvic Tilt 0 d 0 d Need R    Supine Bridging 0 d 0 d Poor trunk rot    Prone Bridging 0 d 0 d Excessive lordosis Side-lying Hip Abduction 0 d 0 d Excessive lordosis       Side-lying Hip Adduction 0 d 0 d Excessive lordosis       Total Scores 0/15  0/15  Overall score 0/30

## 2023-01-13 NOTE — TELEPHONE ENCOUNTER
1/13/2023 4:17 PM Called Darya in regards to her 's messages that she was in pain  She did go to therapy and it didn't hurt her  She woke up this morning crying in pain  She is now 10 out of 10 pain  Will have her start prednisone tomorrow  Will take tylenol  Will take ibuprofen today only, then start prednisone tomorrow      Message complete  Barry Brown, DO

## 2023-01-17 ENCOUNTER — TELEPHONE (OUTPATIENT)
Dept: FAMILY MEDICINE CLINIC | Facility: CLINIC | Age: 58
End: 2023-01-17

## 2023-01-17 ENCOUNTER — OFFICE VISIT (OUTPATIENT)
Dept: PHYSICAL THERAPY | Facility: CLINIC | Age: 58
End: 2023-01-17

## 2023-01-17 DIAGNOSIS — M54.16 LUMBAR RADICULOPATHY: ICD-10-CM

## 2023-01-17 DIAGNOSIS — M54.12 CERVICAL RADICULOPATHY: Primary | ICD-10-CM

## 2023-01-17 NOTE — TELEPHONE ENCOUNTER
Pt dropped off form for new drug store  Says when sending a new prescription need to add e-mail  Please contact with any questions

## 2023-01-17 NOTE — PROGRESS NOTES
Daily Note     Today's date: 2023  Patient name: Supa Strickland  : 1965  MRN: [id number]  Referring provider: Gerardo Almeida DO  Dx:   Encounter Diagnosis     ICD-10-CM    1  Cervical radiculopathy  M54 12       2  Lumbar radiculopathy  M54 16                      Subjective: I am having less pain, not sure if its PT, meds or accupuncture  Objective: See treatment diary below      Assessment: Tolerated treatment well  Patient demonstrated fatigue post treatment and exhibited good technique with therapeutic exercises      Plan: Continue per plan of care        Precautions: Medical History    Diagnosis Date Comment Source   Apnea 2009     Arthralgia  last assessed 13    Depression 10/09/2006     Dysmenorrhea 2009     Essential hypertension 3/8/2018     Exposure to potentially hazardous body fluids  last assessed 16    Insomnia 10/09/2006     Kidney stone  20 years ago    Muscle weakness (generalized) 2006     Obesity      Viral gastroenteritis            Manuals                                                                 Neuro Re-Ed            Neurac supine pelvic lift 2x5 2x5           Neurac Bridge 2x5 2x5           Neurac SDLY Hip ADD/ABD 2x5  2x5 2x5  2x5           Neurac prone plank 2x5 2x5           Neurac Cerv retract supine 2x5 2x5           Neurac scap retract supine 2x5 2x5           Neurac scap retract w depression sit 2x5 2x5                                                                                                                                Ther Activity                                       Gait Training                                       Modalities

## 2023-01-18 NOTE — TELEPHONE ENCOUNTER
Added the new pharmacy into her chart  LMOM for the patient to call back  Please ask if she needs any refills sent now     Terra Diana LPN

## 2023-01-20 ENCOUNTER — APPOINTMENT (OUTPATIENT)
Dept: PHYSICAL THERAPY | Facility: CLINIC | Age: 58
End: 2023-01-20

## 2023-01-20 ENCOUNTER — TELEPHONE (OUTPATIENT)
Dept: FAMILY MEDICINE CLINIC | Facility: CLINIC | Age: 58
End: 2023-01-20

## 2023-01-20 NOTE — TELEPHONE ENCOUNTER
Online form completed  And message sent to patient with confirmation number    Message complete  Elizabeth Perez, DO

## 2023-01-20 NOTE — TELEPHONE ENCOUNTER
Pt dropped off info for Dr Evangelina Armando for temp disability, states dr Evangelina Armando know about this

## 2023-01-23 ENCOUNTER — OFFICE VISIT (OUTPATIENT)
Dept: PHYSICAL THERAPY | Facility: CLINIC | Age: 58
End: 2023-01-23

## 2023-01-23 DIAGNOSIS — M54.16 LUMBAR RADICULOPATHY: ICD-10-CM

## 2023-01-23 DIAGNOSIS — M54.12 CERVICAL RADICULOPATHY: Primary | ICD-10-CM

## 2023-01-23 NOTE — PROGRESS NOTES
Daily Note     Today's date: 2023  Patient name: Isidoro Epperson  : 1965  MRN: [id number]  Referring provider: Jasper Webb DO  Dx:   Encounter Diagnosis     ICD-10-CM    1  Cervical radiculopathy  M54 12       2  Lumbar radiculopathy  M54 16                      Subjective: My back pain is 50-75% improved  Objective: See treatment diary below      Assessment: Tolerated treatment well  Patient demonstrated fatigue post treatment and exhibited good technique with therapeutic exercises      Plan: Continue per plan of care        Precautions: Medical History    Diagnosis Date Comment Source   Apnea 2009     Arthralgia  last assessed 13    Depression 10/09/2006     Dysmenorrhea 2009     Essential hypertension 3/8/2018     Exposure to potentially hazardous body fluids  last assessed 16    Insomnia 10/09/2006     Kidney stone  20 years ago    Muscle weakness (generalized) 2006     Obesity      Viral gastroenteritis            Manuals                                                                 Neuro Re-Ed           Neurac supine pelvic lift 2x5 2x5 2x5          Neurac Bridge 2x5 2x5 2x5          Neurac SDLY Hip ADD/ABD 2x5  2x5 2x5  2x5 2x5  2x5          Neurac prone plank 2x5 2x5 2x5          Neurac Cerv retract supine 2x5 2x5 2x5          Neurac scap retract supine 2x5 2x5 2x5          Neurac scap retract w depression sit 2x5 2x5 2x5                                                                                                                               Ther Activity                                       Gait Training                                       Modalities

## 2023-01-26 ENCOUNTER — OFFICE VISIT (OUTPATIENT)
Dept: PHYSICAL THERAPY | Facility: CLINIC | Age: 58
End: 2023-01-26

## 2023-01-26 DIAGNOSIS — M54.16 LUMBAR RADICULOPATHY: ICD-10-CM

## 2023-01-26 DIAGNOSIS — M54.12 CERVICAL RADICULOPATHY: Primary | ICD-10-CM

## 2023-01-26 NOTE — PROGRESS NOTES
Daily Note     Today's date: 2023  Patient name: Zackery Morrell  : 1965  MRN: [id number]  Referring provider: Sulaiman Rosario DO  Dx:   Encounter Diagnosis     ICD-10-CM    1  Cervical radiculopathy  M54 12       2  Lumbar radiculopathy  M54 16                      Subjective: I am feeling less pain, more ROM and strength  Objective: See treatment diary below      Assessment: Tolerated treatment well  Patient demonstrated fatigue post treatment and exhibited good technique with therapeutic exercises      Plan: Continue per plan of care        Precautions: Medical History    Diagnosis Date Comment Source   Apnea 2009     Arthralgia  last assessed 13    Depression 10/09/2006     Dysmenorrhea 2009     Essential hypertension 3/8/2018     Exposure to potentially hazardous body fluids  last assessed 16    Insomnia 10/09/2006     Kidney stone  20 years ago    Muscle weakness (generalized) 2006     Obesity      Viral gastroenteritis            Manuals                                                                 Neuro Re-Ed          Neurac supine pelvic lift 2x5 2x5 2x5 2x5         Neurac Bridge 2x5 2x5 2x5 2x5         Neurac SDLY Hip ADD/ABD 2x5  2x5 2x5  2x5 2x5  2x5 2x5  2x5         Neurac prone plank 2x5 2x5 2x5 2x5         Neurac Cerv retract supine 2x5 2x5 2x5 2x5         Neurac scap retract supine 2x5 2x5 2x5 2x5         Neurac scap retract w depression sit 2x5 2x5 2x5 2x5                                                                                                                              Ther Activity                                       Gait Training                                       Modalities

## 2023-01-30 ENCOUNTER — OFFICE VISIT (OUTPATIENT)
Dept: PHYSICAL THERAPY | Facility: CLINIC | Age: 58
End: 2023-01-30

## 2023-01-30 DIAGNOSIS — M54.16 LUMBAR RADICULOPATHY: ICD-10-CM

## 2023-01-30 DIAGNOSIS — M54.12 CERVICAL RADICULOPATHY: Primary | ICD-10-CM

## 2023-01-30 NOTE — PROGRESS NOTES
Daily Note     Today's date: 2023  Patient name: Levar Bonds  : 1965  MRN: [id number]  Referring provider: Joby Payne DO  Dx:   Encounter Diagnosis     ICD-10-CM    1  Cervical radiculopathy  M54 12       2  Lumbar radiculopathy  M54 16                      Subjective: I get pain with sleeping in bed not on my couch  Objective: See treatment diary below      Assessment: Tolerated treatment well  Patient demonstrated fatigue post treatment and exhibited good technique with therapeutic exercises      Plan: Continue per plan of care        Precautions: Medical History    Diagnosis Date Comment Source   Apnea 2009     Arthralgia  last assessed 13    Depression 10/09/2006     Dysmenorrhea 2009     Essential hypertension 3/8/2018     Exposure to potentially hazardous body fluids  last assessed 16    Insomnia 10/09/2006     Kidney stone  20 years ago    Muscle weakness (generalized) 2006     Obesity      Viral gastroenteritis            Manuals                                                                 Neuro Re-Ed         Neurac supine pelvic lift 2x5 2x5 2x5 2x5 2x5        Neurac Bridge 2x5 2x5 2x5 2x5 2x5        Neurac SDLY Hip ADD/ABD 2x5  2x5 2x5  2x5 2x5  2x5 2x5  2x5 2x5  2x5        Neurac prone plank 2x5 2x5 2x5 2x5 2x5        Neurac Cerv retract supine 2x5 2x5 2x5 2x5 2x5        Neurac scap retract supine 2x5 2x5 2x5 2x5 2x5        Neurac scap retract w depression sit 2x5 2x5 2x5 2x5 2x5                                                                                                                             Ther Activity                                       Gait Training                                       Modalities

## 2023-02-02 ENCOUNTER — OFFICE VISIT (OUTPATIENT)
Dept: PHYSICAL THERAPY | Facility: CLINIC | Age: 58
End: 2023-02-02

## 2023-02-02 DIAGNOSIS — M54.12 CERVICAL RADICULOPATHY: Primary | ICD-10-CM

## 2023-02-02 NOTE — PROGRESS NOTES
Daily Note     Today's date: 2023  Patient name: Levar Bonds  : 1965  MRN: [id number]  Referring provider: Joby Payne DO  Dx:   Encounter Diagnosis     ICD-10-CM    1  Cervical radiculopathy  M54 12                      Subjective: After [place] I have less pain  Objective: See treatment diary below      Assessment: Tolerated treatment well  Patient demonstrated fatigue post treatment and exhibited good technique with therapeutic exercises      Plan: Continue per plan of care        Precautions: Medical History    Diagnosis Date Comment Source   Apnea 2009     Arthralgia  last assessed 13    Depression 10/09/2006     Dysmenorrhea 2009     Essential hypertension 3/8/2018     Exposure to potentially hazardous body fluids  last assessed 16    Insomnia 10/09/2006     Kidney stone  20 years ago    Muscle weakness (generalized) 2006     Obesity      Viral gastroenteritis            Manuals                                                                 Neuro Re-Ed        Neurac supine pelvic lift 2x5 2x5 2x5 2x5 2x5 2x5       Neurac Bridge 2x5 2x5 2x5 2x5 2x5 2x5       Neurac SDLY Hip ADD/ABD 2x5  2x5 2x5  2x5 2x5  2x5 2x5  2x5 2x5  2x5 2x5  2x5       Neurac prone plank 2x5 2x5 2x5 2x5 2x5 2x5       Neurac Cerv retract supine 2x5 2x5 2x5 2x5 2x5 2x5       Neurac scap retract supine 2x5 2x5 2x5 2x5 2x5 2x5       Neurac scap retract w depression sit 2x5 2x5 2x5 2x5 2x5 2x5                                                                                                                            Ther Activity                                       Gait Training                                       Modalities

## 2023-02-07 ENCOUNTER — OFFICE VISIT (OUTPATIENT)
Dept: PHYSICAL THERAPY | Facility: CLINIC | Age: 58
End: 2023-02-07

## 2023-02-07 DIAGNOSIS — M54.12 CERVICAL RADICULOPATHY: Primary | ICD-10-CM

## 2023-02-07 DIAGNOSIS — M54.16 LUMBAR RADICULOPATHY: ICD-10-CM

## 2023-02-07 NOTE — PROGRESS NOTES
Daily Note     Today's date: 2023  Patient name: Flaco Pedersen  : 1965  MRN: [id number]  Referring provider: Farzana Lawrence DO  Dx:   Encounter Diagnosis     ICD-10-CM    1  Cervical radiculopathy  M54 12       2  Lumbar radiculopathy  M54 16                      Subjective: I feel very good  Objective: See treatment diary below      Assessment: Tolerated treatment well  Patient demonstrated fatigue post treatment and exhibited good technique with therapeutic exercises      Plan: Continue per plan of care        Precautions: Medical History    Diagnosis Date Comment Source   Apnea 2009     Arthralgia  last assessed 13    Depression 10/09/2006     Dysmenorrhea 2009     Essential hypertension 3/8/2018     Exposure to potentially hazardous body fluids  last assessed 16    Insomnia 10/09/2006     Kidney stone  20 years ago    Muscle weakness (generalized) 2006     Obesity      Viral gastroenteritis            Manuals                                                                 Neuro Re-Ed       Neurac supine pelvic lift 2x5 2x5 2x5 2x5 2x5 2x5 2x5      Neurac Bridge 2x5 2x5 2x5 2x5 2x5 2x5 2x5      Neurac SDLY Hip ADD/ABD 2x5  2x5 2x5  2x5 2x5  2x5 2x5  2x5 2x5  2x5 2x5  2x5 2x5  2x5      Neurac prone plank 2x5 2x5 2x5 2x5 2x5 2x5 2x5      Neurac Cerv retract supine 2x5 2x5 2x5 2x5 2x5 2x5 2x5      Neurac scap retract supine 2x5 2x5 2x5 2x5 2x5 2x5 2x5      Neurac scap retract w depression sit 2x5 2x5 2x5 2x5 2x5 2x5 2x5                                                                                                                           Ther Activity                                       Gait Training                                       Modalities

## 2023-02-09 ENCOUNTER — OFFICE VISIT (OUTPATIENT)
Dept: PHYSICAL THERAPY | Facility: CLINIC | Age: 58
End: 2023-02-09

## 2023-02-09 DIAGNOSIS — M54.16 LUMBAR RADICULOPATHY: ICD-10-CM

## 2023-02-09 DIAGNOSIS — M54.12 CERVICAL RADICULOPATHY: Primary | ICD-10-CM

## 2023-02-09 NOTE — PROGRESS NOTES
Daily Note     Today's date: 2023  Patient name: Wendy Garcia  : 1965  MRN: [id number]  Referring provider: Nicole Kam DO  Dx:   Encounter Diagnosis     ICD-10-CM    1  Cervical radiculopathy  M54 12       2  Lumbar radiculopathy  M54 16                      Subjective: I am feeling better with [place]  Objective: See treatment diary below      Assessment: Tolerated treatment well  Patient demonstrated fatigue post treatment and exhibited good technique with therapeutic exercises      Plan: Continue per plan of care        Precautions: Medical History    Diagnosis Date Comment Source   Apnea 2009     Arthralgia  last assessed 13    Depression 10/09/2006     Dysmenorrhea 2009     Essential hypertension 3/8/2018     Exposure to potentially hazardous body fluids  last assessed 16    Insomnia 10/09/2006     Kidney stone  20 years ago    Muscle weakness (generalized) 2006     Obesity      Viral gastroenteritis            Manuals                                                                 Neuro Re-Ed      Neurac supine pelvic lift 2x5 2x5 2x5 2x5 2x5 2x5 2x5 2x5     Neurac Bridge 2x5 2x5 2x5 2x5 2x5 2x5 2x5 2x5     Neurac SDLY Hip ADD/ABD 2x5  2x5 2x5  2x5 2x5  2x5 2x5  2x5 2x5  2x5 2x5  2x5 2x5  2x5 2x5  2x5     Neurac prone plank 2x5 2x5 2x5 2x5 2x5 2x5 2x5 2x5     Neurac Cerv retract supine 2x5 2x5 2x5 2x5 2x5 2x5 2x5 2x5     Neurac scap retract supine 2x5 2x5 2x5 2x5 2x5 2x5 2x5 2x5     Neurac scap retract w depression sit 2x5 2x5 2x5 2x5 2x5 2x5 2x5 2x5                                                                                                                          Ther Activity                                       Gait Training                                       Modalities

## 2023-02-14 ENCOUNTER — OFFICE VISIT (OUTPATIENT)
Dept: PHYSICAL THERAPY | Facility: CLINIC | Age: 58
End: 2023-02-14

## 2023-02-14 DIAGNOSIS — M54.16 LUMBAR RADICULOPATHY: ICD-10-CM

## 2023-02-14 DIAGNOSIS — M54.12 CERVICAL RADICULOPATHY: Primary | ICD-10-CM

## 2023-02-14 NOTE — PROGRESS NOTES
Daily Note     Today's date: 2023  Patient name: Mali Chen  : 1965  MRN: [id number]  Referring provider: Eneida Vasquez DO  Dx:   Encounter Diagnosis     ICD-10-CM    1  Cervical radiculopathy  M54 12       2  Lumbar radiculopathy  M54 16                      Subjective: I am feeling much better  Objective: See treatment diary below      Assessment: Tolerated treatment well  Patient demonstrated fatigue post treatment and exhibited good technique with therapeutic exercises      Plan: Continue per plan of care        Precautions: Medical History    Diagnosis Date Comment Source   Apnea 2009     Arthralgia  last assessed 13    Depression 10/09/2006     Dysmenorrhea 2009     Essential hypertension 3/8/2018     Exposure to potentially hazardous body fluids  last assessed 16    Insomnia 10/09/2006     Kidney stone  20 years ago    Muscle weakness (generalized) 2006     Obesity      Viral gastroenteritis            Manuals                                                                 Neuro Re-Ed     Neurac supine pelvic lift 2x5 2x5 2x5 2x5 2x5 2x5 2x5 2x5 2x5    Neurac Bridge 2x5 2x5 2x5 2x5 2x5 2x5 2x5 2x5 2x5    Neurac SDLY Hip ADD/ABD 2x5  2x5 2x5  2x5 2x5  2x5 2x5  2x5 2x5  2x5 2x5  2x5 2x5  2x5 2x5  2x5 2x5  2x5    Neurac prone plank 2x5 2x5 2x5 2x5 2x5 2x5 2x5 2x5 2x5    Neurac Cerv retract supine 2x5 2x5 2x5 2x5 2x5 2x5 2x5 2x5 2x5    Neurac scap retract supine 2x5 2x5 2x5 2x5 2x5 2x5 2x5 2x5 2x5    Neurac scap retract w depression sit 2x5 2x5 2x5 2x5 2x5 2x5 2x5 2x5 2x5                                                                                                                         Ther Activity                                       Gait Training                                       Modalities

## 2023-02-16 ENCOUNTER — OFFICE VISIT (OUTPATIENT)
Dept: PHYSICAL THERAPY | Facility: CLINIC | Age: 58
End: 2023-02-16

## 2023-02-16 DIAGNOSIS — M54.12 CERVICAL RADICULOPATHY: Primary | ICD-10-CM

## 2023-02-16 NOTE — PROGRESS NOTES
PT Discharge    Today's date: 2023  Patient name: Zackery Morrell  : 1965  MRN: [id number]  Referring provider: Sulaiman Rosario DO  Dx:   Encounter Diagnosis     ICD-10-CM    1  Cervical radiculopathy  M54 12                      Assessment  Assessment details: Zackery Morrell has been seen for Cervical radiculopathy  (primary encounter diagnosis),and has met the goals of physical therapy  And is independent with a HEP  Subjective Evaluation    History of Present Illness  Mechanism of injury: I have no back pain, [place] was great          Objective               Manuals                                                                 Neuro Re-Ed    Neurac supine pelvic lift 2x5 2x5 2x5 2x5 2x5 2x5 2x5 2x5 2x5 2x5   Neurac Bridge 2x5 2x5 2x5 2x5 2x5 2x5 2x5 2x5 2x5 2x5   Neurac SDLY Hip ADD/ABD 2x5  2x5 2x5  2x5 2x5  2x5 2x5  2x5 2x5  2x5 2x5  2x5 2x5  2x5 2x5  2x5 2x5  2x5 2x5  2x5   Neurac prone plank 2x5 2x5 2x5 2x5 2x5 2x5 2x5 2x5 2x5 2x5   Neurac Cerv retract supine 2x5 2x5 2x5 2x5 2x5 2x5 2x5 2x5 2x5 2x5   Neurac scap retract supine 2x5 2x5 2x5 2x5 2x5 2x5 2x5 2x5 2x5 2x5   Neurac scap retract w depression sit 2x5 2x5 2x5 2x5 2x5 2x5 2x5 2x5 2x5 2x5                                                                                                                        Ther Activity

## 2023-02-20 ENCOUNTER — OFFICE VISIT (OUTPATIENT)
Dept: FAMILY MEDICINE CLINIC | Facility: CLINIC | Age: 58
End: 2023-02-20

## 2023-02-20 VITALS
HEIGHT: 62 IN | DIASTOLIC BLOOD PRESSURE: 70 MMHG | TEMPERATURE: 97.7 F | WEIGHT: 199 LBS | BODY MASS INDEX: 36.62 KG/M2 | HEART RATE: 113 BPM | RESPIRATION RATE: 16 BRPM | OXYGEN SATURATION: 94 % | SYSTOLIC BLOOD PRESSURE: 108 MMHG

## 2023-02-20 DIAGNOSIS — M54.12 CERVICAL RADICULOPATHY: Primary | ICD-10-CM

## 2023-02-20 DIAGNOSIS — Z12.31 SCREENING MAMMOGRAM, ENCOUNTER FOR: ICD-10-CM

## 2023-02-20 DIAGNOSIS — M79.10 MYALGIA: ICD-10-CM

## 2023-02-20 RX ORDER — DULOXETIN HYDROCHLORIDE 30 MG/1
30 CAPSULE, DELAYED RELEASE ORAL DAILY
Qty: 30 CAPSULE | Refills: 1 | Status: SHIPPED | OUTPATIENT
Start: 2023-02-20

## 2023-02-20 NOTE — PROGRESS NOTES
Name: Goyo Sam      : 1965      MRN: [id number]  Encounter Provider: Param Barrett DO  Encounter Date: 2023   Encounter department: [address] [place] [address]     1  Cervical radiculopathy  Comments:  still having pain even with 6 weeks of physical therapy  still can't lift, still having hand numbness  Orders:  -     MRI cervical spine wo contrast; Future; Expected date: 2023    2  Myalgia  -     DULoxetine (Cymbalta) 30 mg delayed release capsule; Take 1 capsule (30 mg total) by mouth daily    3  Screening mammogram, encounter for  -     Mammo screening bilateral w 3d & cad; Future; Expected date: 2023      Return for Next scheduled follow up  Subjective      She is still feeling dizzy  She can't lift a bag of dog food  The level of pain is down to a 3 out of 10 in her low back  She is still having pain going down her arms  She still has tingling in her hands  She is getting nauseated with arm position  Review of Systems    Current Outpatient Medications on File Prior to Visit   Medication Sig   • estrogen, conjugated,-medroxyprogesterone (PREMPRO) 0 625-2 5 MG per tablet Take 1 tablet by mouth daily   • losartan (COZAAR) 50 mg tablet Take 1 tablet (50 mg total) by mouth in the morning   • spironolactone (ALDACTONE) 25 mg tablet Take 1 tablet (25 mg total) by mouth daily   • [DISCONTINUED] DULoxetine (Cymbalta) 30 mg delayed release capsule Take 1 capsule (30 mg total) by mouth daily       Objective     /70   Pulse (!) 113   Temp 97 7 °F (36 5 °C)   Resp 16   Ht 5' 2" (1 575 m)   Wt 90 3 kg (199 lb)   SpO2 94%   BMI 36 40 kg/m²     Physical Exam  Vitals and nursing note reviewed  Constitutional:       Appearance: She is well-developed  HENT:      Head: Normocephalic and atraumatic        Right Ear: Tympanic membrane and external ear normal       Left Ear: Tympanic membrane and external ear normal    Cardiovascular:      Rate and Rhythm: Normal rate and regular rhythm  Heart sounds: Normal heart sounds  No murmur heard  No friction rub  Pulmonary:      Effort: Pulmonary effort is normal  No respiratory distress  Breath sounds: Normal breath sounds  No wheezing or rales  Musculoskeletal:         General: Tenderness (bilateral paracervical) present  Right lower leg: No edema  Left lower leg: No edema         Yash Deborah, DO

## 2023-03-09 ENCOUNTER — HOSPITAL ENCOUNTER (OUTPATIENT)
Dept: RADIOLOGY | Facility: HOSPITAL | Age: 58
Discharge: HOME | End: 2023-03-09

## 2023-03-09 DIAGNOSIS — M54.12 CERVICAL RADICULOPATHY: ICD-10-CM

## 2023-03-21 ENCOUNTER — OFFICE VISIT (OUTPATIENT)
Dept: FAMILY MEDICINE CLINIC | Facility: CLINIC | Age: 58
End: 2023-03-21

## 2023-03-21 VITALS
RESPIRATION RATE: 18 BRPM | DIASTOLIC BLOOD PRESSURE: 82 MMHG | HEART RATE: 78 BPM | TEMPERATURE: 99.8 F | BODY MASS INDEX: 36.95 KG/M2 | SYSTOLIC BLOOD PRESSURE: 126 MMHG | WEIGHT: 202 LBS

## 2023-03-21 DIAGNOSIS — M48.02 CERVICAL STENOSIS OF SPINE: Primary | ICD-10-CM

## 2023-03-21 NOTE — ASSESSMENT & PLAN NOTE
She continues to have pain and muscle spasms in her upper neck    She is going to follow-up with her spine surgeon and start physical therapy

## 2023-03-21 NOTE — PROGRESS NOTES
Name: Betty Moe      : 1965      MRN: [id number]  Encounter Provider: Zoe Davalos DO  Encounter Date: 3/21/2023   Encounter department: [address] [place] [address]     1  Cervical stenosis of spine  Assessment & Plan:  She continues to have pain and muscle spasms in her upper neck  She is going to follow-up with her spine surgeon and start physical therapy    Orders:  -     Ambulatory referral to Physical Therapy; Future         Return for Scheduled follow up in May  Subjective      She has been having worsening upper back and neck pain  She is still not feeling well  She still unable to lift  She tried to grab a 10 pound bag of dog food and had to drop it    Review of Systems    Current Outpatient Medications on File Prior to Visit   Medication Sig   • DULoxetine (Cymbalta) 30 mg delayed release capsule Take 1 capsule (30 mg total) by mouth daily   • losartan (COZAAR) 50 mg tablet Take 1 tablet (50 mg total) by mouth in the morning   • spironolactone (ALDACTONE) 25 mg tablet Take 1 tablet (25 mg total) by mouth daily   • [DISCONTINUED] estrogen, conjugated,-medroxyprogesterone (PREMPRO) 0 625-2 5 MG per tablet Take 1 tablet by mouth daily (Patient not taking: Reported on 3/21/2023)       Objective     /82   Pulse 78   Temp 99 8 °F (37 7 °C)   Resp 18   Wt 91 6 kg (202 lb)   BMI 36 95 kg/m²     Physical Exam  Vitals and nursing note reviewed  Constitutional:       Appearance: She is well-developed  HENT:      Head: Normocephalic and atraumatic  Right Ear: Tympanic membrane and external ear normal       Left Ear: Tympanic membrane and external ear normal    Cardiovascular:      Rate and Rhythm: Normal rate and regular rhythm  Heart sounds: Normal heart sounds  No murmur heard  No friction rub  Pulmonary:      Effort: Pulmonary effort is normal  No respiratory distress  Breath sounds: Normal breath sounds  No wheezing or rales     Musculoskeletal: Right lower leg: No edema  Left lower leg: No edema         Andrea Spencer DO

## 2023-03-21 NOTE — PATIENT INSTRUCTIONS
Study Result    Narrative & Impression   MRI CERVICAL SPINE WITHOUT CONTRAST     INDICATION: M54 12: Radiculopathy, cervical region  COMPARISON:  None  TECHNIQUE:  Multiplanar, multisequence imaging of the cervical spine was performed             IMAGE QUALITY:  Diagnostic     FINDINGS:     ALIGNMENT:  There is reversal of the normal cervical lordosis centered at C4-C5  MARROW SIGNAL:  There is no suspicious marrow signal abnormality  There is a hemangioma within the T2 vertebral body  CERVICAL AND VISUALIZED THORACIC CORD:  Normal signal within the visualized cord  PREVERTEBRAL AND PARASPINAL SOFT TISSUES:  Normal      VISUALIZED POSTERIOR FOSSA:  The visualized posterior fossa demonstrates no abnormal signal      CERVICAL DISC SPACES:     C2-C3:  Normal      C3-C4:  There is a disc osteophyte complex with spurring  There is facet arthrosis  There is moderate to severe left and mild right foraminal narrowing  There is mild canal stenosis  C4-C5:  There is disc space narrowing  There is a disc osteophyte complex with uncovertebral spurring  There is moderate to severe bilateral foraminal narrowing  C5-C6:  There is disc space narrowing  There is disc osteophyte complex with uncovertebral spurring  There is moderate canal stenosis  There is moderate to severe left foraminal narrowing  There is mild right foraminal encroachment  C6-C7:  There is a disc osteophyte complex with uncovertebral spurring  There is moderate to severe left and mild right foraminal narrowing  C7-T1:  Normal      UPPER THORACIC DISC SPACES:  Normal      OTHER FINDINGS:  None  IMPRESSION:     Multilevel cervical spondylosis, as described above  Multifactorial disease results in moderate canal stenosis C5-C6  Multilevel moderate to severe foraminal narrowing from C3-C4 through C6-C7 as described above

## 2023-05-12 ENCOUNTER — RA CDI HCC (OUTPATIENT)
Dept: OTHER | Facility: HOSPITAL | Age: 58
End: 2023-05-12

## 2023-05-12 NOTE — PROGRESS NOTES
[facility]  coding opportunities          Chart Reviewed number of suggestions sent to Provider: 1  [phone]     Patients Insurance        Commercial Insurance: Volodymyr Alcala

## 2023-08-14 NOTE — PROGRESS NOTES
Assessment/Plan:  Chronic recalcitrant plantar fasciitis  Plan  X-rays performed patient advised on procedure  Patient given written instructions  Consent form signed  All medications ordered  No problem-specific Assessment & Plan notes found for this encounter  There are no diagnoses linked to this encounter  Subjective:      Patient ID: Joel Baptiste is a 46 y o  female  Patient has continued severe pain in her right heel  Patient has been recalcitrant to all treatment  She has failed injection therapy, anti-inflammatory medicines, immobilization and orthotics  She has pain with ambulation  She is requesting surgical intervention in the hope of alleviating her complaint of pain  She is consenting to surgery understanding all the possible risks benefits alternatives and complications understand that no guarantees have been given  The following portions of the patient's history were reviewed and updated as appropriate: allergies, current medications, past family history, past medical history, past social history, past surgical history and problem list     Review of Systems      Objective: Foot Exam    General  General Appearance: appears stated age and healthy   Orientation: alert and oriented to person, place, and time   Affect: appropriate   Gait: antalgic       Right Foot/Ankle     Inspection and Palpation  Tenderness: plantar fascia     Neurovascular  Dorsalis pedis: 3+  Posterior tibial: 3+    Comments  Pain with palpation right heel  No evidence of mass  No evidence of infection  X-ray  X-ray demonstrates plantar calcaneal spurring  Left Foot/Ankle      Neurovascular  Dorsalis pedis: 3+  Posterior tibial: 3+        Physical Exam   Cardiovascular:   Pulses:       Dorsalis pedis pulses are 3+ on the right side, and 3+ on the left side  Posterior tibial pulses are 3+ on the right side, and 3+ on the left side         Review of Systems        Constitutional: No fever, no chills, feels well, no tiredness, no recent weight gain or weight loss  Eyes: No complaints of eye pain, no red eyes, no eyesight problems, no discharge, no dry eyes, no itching of eyes  ENT: no complaints of earache, no loss of hearing, no nose bleeds, no nasal discharge, no sore throat, no hoarseness  Cardiovascular: No complaints of slow heart rate, no fast heart rate, no chest pain, no palpitations, no leg claudication, no lower extremity edema  Respiratory: No complaints of shortness of breath, no wheezing, no cough, no SOB on exertion, no orthopnea, no PND  Gastrointestinal: No complaints of abdominal pain, no constipation, no nausea or vomiting, no diarrhea, no bloody stools  Genitourinary: No complaints of dysuria, no incontinence, no pelvic pain, no dysmenorrhea, no vaginal discharge or bleeding  Musculoskeletal: No complaints of arthralgias, no myalgias, no joint swelling or stiffness, no limb pain or swelling  Integumentary: No complaints of skin rash or lesions, no itching, no skin wounds, no breast pain or lump  Neurological: No complaints of headache, no confusion, no convulsions, no numbness, no dizziness or fainting, no tingling, no limb weakness, no difficulty walking  Psychiatric: Not suicidal, no sleep disturbance, no anxiety or depression, no change in personality, no emotional problems  Endocrine: No complaints of proptosis, no hot flashes, no muscle weakness, no deepening of the voice, no feelings of weakness  Hematologic/Lymphatic: No complaints of swollen glands, no swollen glands in the neck, does not bleed easily, does not bruise easily  Active Problems   1  Acquired ankle/foot deformity (736 70) (M21 969)   2  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 99) (M12 9)   3  BMI 38 0-38 9,adult (V85 38) (Z68 38)   4  Colon cancer screening (V76 51) (Z12 11)   5  Decreased libido (799 81) (R68 82)   6   Difficulty walking (719 7) (R26 2)   7  Disorder of nail (703 9) (L60 9)   8  Encounter for colorectal cancer screening (V76 51) (Z12 11,Z12 12)   9  Encounter for screening mammogram for malignant neoplasm of breast (V76 12)     (Z12 31)   10  Fatigue (780 79) (R53 83)   11  Foot pain, bilateral (729 5) (M79 671,M79 672)   12  Hirsutism (704 1) (L68 0)   13  Hypercholesterolemia (272 0) (E78 00)   14  Impaired fasting glucose (790 21) (R73 01)   15  Nephrolithiasis (592 0) (N20 0)   16  Nicotine dependence (305 1) (F17 200)   17  Obesity, Class II, BMI 35-39 9 (278 00) (E66 9)   18  Pelvic pain in female (625 9) (R10 2)   19  Pes planus, congenital (754 61) (Q66 50)   20  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)   21  Screening mammogram, encounter for (V76 12) (Z12 31)   22   Well woman exam with routine gynecological exam (V72 31) (Z01 419)     Past Medical History    · Acute upper respiratory infection (465 9) (J06 9)   · History of Apnea (786 03) (R06 81)   · History of Arthralgia (719 40) (M25 50)   · History of Benign essential hypertension (401 1) (I10)   · History of Depression (311) (F32 9)   · History of Difficulty breathing (786 09) (R06 89)   · History of Dysmenorrhea (625 3) (N94 6)   · Exposure to potentially hazardous body fluids (V15 85) (Z77 21)   · History of Fatigue (780 79) (R53 83)   · History of acute bronchitis (V12 69) (Z87 09)   · History of Insomnia (780 52) (G47 00)   · History of Muscle weakness (generalized) (728 87) (M62 81)   · History of Screening for cardiovascular condition (V81 2) (Z13 6)   · History of Screening for diabetes mellitus (V77 1) (Z13 1)   · History of Screening for hyperlipidemia (V77 91) (Z13 220)   · History of Viral gastroenteritis (008 8) (A08 4)     Surgical History    · History of Appendectomy   · History of Tubal Ligation     Family History   Mother    · Family history of diabetes mellitus (V18 0) (Z83 3)   · Family history of Fibromyalgia  Father    · Family history of Hypertension  Grandfather    · Family history of stroke (V17 1) (Z82 3)     Social History    · Denied: History of Alcohol use   · Denied: History of Drug use   · Former smoker (V15 82) (L81 892)     Current Meds    1  MetFORMIN HCl - 500 MG Oral Tablet; Take 1 tablet daily; Therapy: [id number] to (Evaluate:17Mar2018)  Requested for: [id number]; Last     Rx:17Nov2017 Ordered     Allergies   1  No Known Drug Allergies  2  No Known Environmental Allergies   3  No Known Food Allergies     Vitals     Recorded: 28Dec2017 03:50PM   Respiration 17   Systolic 101   Diastolic 87   Height 5 ft 2 in   Weight 206 lb    BMI Calculated 37 68   BSA Calculated 1 94      Physical Exam   Left Foot: Appearance: Normal except as noted: excessive pronation-- and-- pes planus  Tenderness: medial calcaneous-- and-- insertion of the plantar fascia  Right Foot: Appearance: Normal except as noted: excessive pronation-- and-- pes planus  Tenderness: None except the medial calcaneous-- and-- insertion of the plantar fascia  Left Ankle: ROM: limited ROM in all planes    Right Ankle: Tenderness: None except the Achilles tendon insertion  ROM: limited ROM in all planes Motor: Normal     Neurological Exam: performed  Light touch was intact bilaterally  Vibratory sensation was intact bilaterally  Response to monofilament test was intact bilaterally  Deep tendon reflexes: patellar reflex present bilaterally-- and-- achilles reflex present bilaterally  Vascular Exam: performed Dorsalis pedis pulses were present bilaterally  Posterior tibial pulses were present bilaterally  Elevation Pallor: absent bilaterally  Dependence rubor was absent bilaterally  Edema: none  Toenails: All of the toenails were elongated,-- hypertrophied-- and-- discolored  Hyperkeratosis: present on both first toes  Shoe Gear Evaluation: performed ()  Recommendation(s): custom inlays-- and-- athletic shoes        Results/Data   I personally reviewed the films/images/results in the office today  My interpretation follows  X-ray Review Early calcaneal spurring noted  Both plantar and retrocalcaneal spurring noted of the right calcaneus  No evidence of fracture or bony mass  no body aches/no chest pain/no chills/no diaphoresis/no edema/no fever/no headache/no hemoptysis/no wheezing

## 2023-11-07 ENCOUNTER — TELEPHONE (OUTPATIENT)
Age: 58
End: 2023-11-07

## 2023-11-07 NOTE — TELEPHONE ENCOUNTER
Patient has physical 12/5/23 but she needs TB done for a job asap she stated. Please advise if I can schedule her a nurse visit for this?     Jarrett Kuhn  CMA

## 2023-11-07 NOTE — TELEPHONE ENCOUNTER
The patient would like to schedule a TB test.   Please place the order and contact the patient for an appointment

## 2023-11-08 ENCOUNTER — CLINICAL SUPPORT (OUTPATIENT)
Dept: FAMILY MEDICINE CLINIC | Facility: CLINIC | Age: 58
End: 2023-11-08
Payer: COMMERCIAL

## 2023-11-08 DIAGNOSIS — Z11.1 SCREENING-PULMONARY TB: Primary | ICD-10-CM

## 2023-11-08 PROCEDURE — 86580 TB INTRADERMAL TEST: CPT

## 2023-11-10 ENCOUNTER — CLINICAL SUPPORT (OUTPATIENT)
Dept: FAMILY MEDICINE CLINIC | Facility: CLINIC | Age: 58
End: 2023-11-10

## 2023-11-10 DIAGNOSIS — Z11.1 ENCOUNTER FOR PPD SKIN TEST READING: Primary | ICD-10-CM

## 2023-11-10 LAB
INDURATION: 0 MM
TB SKIN TEST: NEGATIVE

## 2023-12-05 ENCOUNTER — OFFICE VISIT (OUTPATIENT)
Dept: FAMILY MEDICINE CLINIC | Facility: CLINIC | Age: 58
End: 2023-12-05
Payer: COMMERCIAL

## 2023-12-05 VITALS
OXYGEN SATURATION: 95 % | TEMPERATURE: 97.8 F | RESPIRATION RATE: 18 BRPM | HEART RATE: 89 BPM | WEIGHT: 195 LBS | DIASTOLIC BLOOD PRESSURE: 88 MMHG | HEIGHT: 62 IN | BODY MASS INDEX: 35.88 KG/M2 | SYSTOLIC BLOOD PRESSURE: 140 MMHG

## 2023-12-05 DIAGNOSIS — Z12.31 SCREENING MAMMOGRAM, ENCOUNTER FOR: ICD-10-CM

## 2023-12-05 DIAGNOSIS — M79.10 MYALGIA: ICD-10-CM

## 2023-12-05 DIAGNOSIS — I10 ESSENTIAL HYPERTENSION: ICD-10-CM

## 2023-12-05 DIAGNOSIS — Z12.11 SCREENING FOR COLON CANCER: ICD-10-CM

## 2023-12-05 DIAGNOSIS — Z13.0 SCREENING FOR DEFICIENCY ANEMIA: ICD-10-CM

## 2023-12-05 DIAGNOSIS — L68.0 HIRSUTISM: ICD-10-CM

## 2023-12-05 DIAGNOSIS — Z12.2 ENCOUNTER FOR SCREENING FOR LUNG CANCER: ICD-10-CM

## 2023-12-05 DIAGNOSIS — Z23 NEED FOR INFLUENZA VACCINATION: ICD-10-CM

## 2023-12-05 DIAGNOSIS — F17.210 SMOKING GREATER THAN 20 PACK YEARS: ICD-10-CM

## 2023-12-05 DIAGNOSIS — Z00.00 ANNUAL PHYSICAL EXAM: Primary | ICD-10-CM

## 2023-12-05 PROCEDURE — 90686 IIV4 VACC NO PRSV 0.5 ML IM: CPT

## 2023-12-05 PROCEDURE — 90471 IMMUNIZATION ADMIN: CPT

## 2023-12-05 PROCEDURE — 99396 PREV VISIT EST AGE 40-64: CPT | Performed by: FAMILY MEDICINE

## 2023-12-05 RX ORDER — DULOXETIN HYDROCHLORIDE 30 MG/1
30 CAPSULE, DELAYED RELEASE ORAL DAILY
Qty: 90 CAPSULE | Refills: 1 | Status: SHIPPED | OUTPATIENT
Start: 2023-12-05

## 2023-12-05 RX ORDER — LOSARTAN POTASSIUM 50 MG/1
50 TABLET ORAL DAILY
Qty: 90 TABLET | Refills: 1 | Status: SHIPPED | OUTPATIENT
Start: 2023-12-05

## 2023-12-05 RX ORDER — SPIRONOLACTONE 25 MG/1
25 TABLET ORAL DAILY
Qty: 90 TABLET | Refills: 1 | Status: SHIPPED | OUTPATIENT
Start: 2023-12-05

## 2023-12-05 NOTE — PATIENT INSTRUCTIONS
Wellness Visit for Adults   AMBULATORY CARE:   A wellness visit  is when you see your healthcare provider to get screened for health problems. Your healthcare provider will also give you advice on how to stay healthy. Write down your questions so you remember to ask them. Ask your healthcare provider how often you should have a wellness visit. What happens at a wellness visit:  Your healthcare provider will ask about your health, and your family history of health problems. This includes high blood pressure, heart disease, and cancer. He or she will ask if you have symptoms that concern you, if you smoke, and about your mood. You may also be asked about your intake of medicines, supplements, food, and alcohol. Any of the following may be done: Your weight  will be checked. Your height may also be checked so your body mass index (BMI) can be calculated. Your BMI shows if you are at a healthy weight. Your blood pressure  and heart rate will be checked. Your temperature may also be checked. Blood and urine tests  may be done. Blood tests may be done to check your cholesterol levels. Abnormal cholesterol levels increase your risk for heart disease and stroke. You may also need a blood or urine test to check for diabetes if you are at increased risk. Urine tests may be done to look for signs of an infection or kidney disease. A physical exam  includes checking your heartbeat and lungs with a stethoscope. Your healthcare provider may also check your skin to look for sun damage. Screening tests  may be recommended. A screening test is done to check for diseases that may not cause symptoms. The screening tests you may need depend on your age, gender, family history, and lifestyle habits. For example, colorectal screening may be recommended if you are 48years old or older. Screening tests you need if you are a woman:   A Pap smear  is used to screen for cervical cancer.  Pap smears are usually done every 3 to 5 years depending on your age. You may need them more often if you have had abnormal Pap smear test results in the past. Ask your healthcare provider how often you should have a Pap smear. A mammogram  is an x-ray of your breasts to screen for breast cancer. Experts recommend mammograms every 2 years starting at age 48 years. You may need a mammogram at age 52 years or younger if you have an increased risk for breast cancer. Talk to your healthcare provider about when you should start having mammograms and how often you need them. Vaccines you may need:   Get an influenza vaccine  every year. The influenza vaccine protects you from the flu. Several types of viruses cause the flu. The viruses change over time, so new vaccines are made each year. Get a tetanus-diphtheria (Td) booster vaccine  every 10 years. This vaccine protects you against tetanus and diphtheria. Tetanus is a severe infection that may cause painful muscle spasms and lockjaw. Diphtheria is a severe bacterial infection that causes a thick covering in the back of your mouth and throat. Get a human papillomavirus (HPV) vaccine  if you are female and aged 23 to 32 or male 23 to 24 and never received it. This vaccine protects you from HPV infection. HPV is the most common infection spread by sexual contact. HPV may also cause vaginal, penile, and anal cancers. Get a pneumococcal vaccine  if you are aged 72 years or older. The pneumococcal vaccine is an injection given to protect you from pneumococcal disease. Pneumococcal disease is an infection caused by pneumococcal bacteria. The infection may cause pneumonia, meningitis, or an ear infection. Get a shingles vaccine  if you are 60 or older, even if you have had shingles before. The shingles vaccine is an injection to protect you from the varicella-zoster virus. This is the same virus that causes chickenpox.  Shingles is a painful rash that develops in people who had chickenpox or have been exposed to the virus. How to eat healthy:  My Plate is a model for planning healthy meals. It shows the types and amounts of foods that should go on your plate. Fruits and vegetables make up about half of your plate, and grains and protein make up the other half. A serving of dairy is included on the side of your plate. The amount of calories and serving sizes you need depends on your age, gender, weight, and height. Examples of healthy foods are listed below:  Eat a variety of vegetables  such as dark green, red, and orange vegetables. You can also include canned vegetables low in sodium (salt) and frozen vegetables without added butter or sauces. Eat a variety of fresh fruits , canned fruit in 100% juice, frozen fruit, and dried fruit. Include whole grains. At least half of the grains you eat should be whole grains. Examples include whole-wheat bread, wheat pasta, brown rice, and whole-grain cereals such as oatmeal.    Eat a variety of protein foods such as seafood (fish and shellfish), lean meat, and poultry without skin (turkey and chicken). Examples of lean meats include pork leg, shoulder, or tenderloin, and beef round, sirloin, tenderloin, and extra lean ground beef. Other protein foods include eggs and egg substitutes, beans, peas, soy products, nuts, and seeds. Choose low-fat dairy products such as skim or 1% milk or low-fat yogurt, cheese, and cottage cheese. Limit unhealthy fats  such as butter, hard margarine, and shortening. Exercise:  Exercise at least 30 minutes per day on most days of the week. Some examples of exercise include walking, biking, dancing, and swimming. You can also fit in more physical activity by taking the stairs instead of the elevator or parking farther away from stores. Include muscle strengthening activities 2 days each week. Regular exercise provides many health benefits.  It helps you manage your weight, and decreases your risk for type 2 diabetes, heart disease, stroke, and high blood pressure. Exercise can also help improve your mood. Ask your healthcare provider about the best exercise plan for you. General health and safety guidelines:   Do not smoke. Nicotine and other chemicals in cigarettes and cigars can cause lung damage. Ask your healthcare provider for information if you currently smoke and need help to quit. E-cigarettes or smokeless tobacco still contain nicotine. Talk to your healthcare provider before you use these products. Limit alcohol. A drink of alcohol is 12 ounces of beer, 5 ounces of wine, or 1½ ounces of liquor. Lose weight, if needed. Being overweight increases your risk of certain health conditions. These include heart disease, high blood pressure, type 2 diabetes, and certain types of cancer. Protect your skin. Do not sunbathe or use tanning beds. Use sunscreen with a SPF 15 or higher. Apply sunscreen at least 15 minutes before you go outside. Reapply sunscreen every 2 hours. Wear protective clothing, hats, and sunglasses when you are outside. Drive safely. Always wear your seatbelt. Make sure everyone in your car wears a seatbelt. A seatbelt can save your life if you are in an accident. Do not use your cell phone when you are driving. This could distract you and cause an accident. Pull over if you need to make a call or send a text message. Practice safe sex. Use latex condoms if are sexually active and have more than one partner. Your healthcare provider may recommend screening tests for sexually transmitted infections (STIs). Wear helmets, lifejackets, and protective gear. Always wear a helmet when you ride a bike or motorcycle, go skiing, or play sports that could cause a head injury. Wear protective equipment when you play sports. Wear a lifejacket when you are on a boat or doing water sports.     © Copyright [employer] 2023 Information is for End User's use only and may not be sold, redistributed or otherwise used for commercial purposes. The above information is an  only. It is not intended as medical advice for individual conditions or treatments. Talk to your doctor, nurse or pharmacist before following any medical regimen to see if it is safe and effective for you.

## 2023-12-05 NOTE — PROGRESS NOTES
[address] [place]    NAME: Ca Oh  AGE: 62 y.o. SEX: female  : 1965     DATE: 2023     Assessment and Plan:     Problem List Items Addressed This Visit     Essential hypertension    Relevant Medications    losartan (COZAAR) 50 mg tablet    spironolactone (ALDACTONE) 25 mg tablet    Other Relevant Orders    CBC    Comprehensive metabolic panel    Lipid Panel with Direct LDL reflex    Hirsutism    Relevant Medications    spironolactone (ALDACTONE) 25 mg tablet    Myalgia    Relevant Medications    DULoxetine (Cymbalta) 30 mg delayed release capsule   Other Visit Diagnoses     Annual physical exam    -  Primary    Smoking greater than 20 pack years        Relevant Orders    CT lung screening program    Encounter for screening for lung cancer        Relevant Orders    CT lung screening program    Screening for colon cancer        Relevant Orders    Ambulatory referral to Gastroenterology    Screening mammogram, encounter for        Relevant Orders    Mammo screening bilateral w 3d & cad    Need for influenza vaccination        Relevant Orders    influenza vaccine, quadrivalent, 0.5 mL, preservative-free, for adult and pediatric patients 6 mos+ (AFLURIA, FLUARIX, FLULAVAL, FLUZONE) (Completed)    Screening for deficiency anemia              Immunizations and preventive care screenings were discussed with patient today. Appropriate education was printed on patient's after visit summary. Counseling:  Exercise: the importance of regular exercise/physical activity was discussed. Recommend exercise 3-5 times per week for at least 30 minutes. Declined pap smear today      Depression Screening and Follow-up Plan: Patient was screened for depression during today's encounter. They screened negative with a PHQ-2 score of 0.     Lung Cancer Screening Shared Decision Making: I discussed with her that she is a candidate for lung cancer CT screening. The following Shared Decision-Making points were covered:  Benefits of screening were discussed, including the rates of reduction in death from lung cancer and other causes. Harms of screening were reviewed, including false positive tests, radiation exposure levels, risks of invasive procedures, risks of complications of screening, and risk of overdiagnosis. I counseled on the importance of adherence to annual lung cancer LDCT screening, impact of co-morbidities, and ability or willingness to undergo diagnosis and treatment. I counseled on the importance of maintaining abstinence as a former smoker or was counseled on the importance of smoking cessation if a current smoker    Review of Eligibility Criteria: She meets all of the criteria for Lung Cancer Screening.   - She is 62 y.o.   - She has 20 pack year tobacco history and is a current smoker or has quit within the past 15 years  - She presents no signs or symptoms of lung cancer    After discussion, the patient decided to elect lung cancer screening. Return in about 6 months (around 6/5/2024) for Next scheduled follow up and pap smear; needs nursing appt for 1 shingrix . Chief Complaint:     Chief Complaint   Patient presents with   • Physical Exam     [facility]      History of Present Illness:     Adult Annual Physical   Patient here for a comprehensive physical exam. The patient reports  good days and bad days . Diet and Physical Activity  Diet/Nutrition: well balanced diet. Exercise: no formal exercise. Depression Screening  PHQ-2/9 Depression Screening    Little interest or pleasure in doing things: 0 - not at all  Feeling down, depressed, or hopeless: 0 - not at all  PHQ-2 Score: 0  PHQ-2 Interpretation: Negative depression screen       General Health  Sleep: sleeps well. Hearing: normal - bilateral.  Vision: no vision problems. Dental: no dental visits for >1 year.    Has dentures     /GYN Health  Has not had a pap smear       Advanced Care Planning  Do you have an advanced directive? no  Do you have a durable medical power of ? no     Review of Systems:     Review of Systems   Musculoskeletal:  Positive for back pain.       Past Medical History:     Past Medical History:   Diagnosis Date   • Apnea 2009   • Arthralgia     last assessed 13   • Depression 10/09/2006   • Dysmenorrhea 2009   • Essential hypertension 3/8/2018   • Exposure to potentially hazardous body fluids     last assessed 16   • Insomnia 10/09/2006   • Kidney stone     20 years ago   • Muscle weakness (generalized) 2006   • Obesity    • Viral gastroenteritis     last assessed 13      Past Surgical History:     Past Surgical History:   Procedure Laterality Date   • APPENDECTOMY      last assessed 17   • PLANTAR FASCIA SURGERY Right 3/30/2018    Procedure: Resection heel spur;  Surgeon: Jon Rose DPM;  Location: [facility];  Service: Podiatry   • PLANTAR FASCIA SURGERY Left 2018    Procedure: RELEASE FASCIA PLANTAR/FASCIOTOMY;  Surgeon: Jon Rose DPM;  Location: [facility];  Service: Podiatry   • FL OSTECTOMY CALCANEUS SPUR W/WO PLNTAR FASCIAL RLS  2018    Procedure: Heel spur resection with partial plantar fasciectomy;  Surgeon: Jon Rose DPM;  Location: [facility];  Service: Podiatry   • SALPINGECTOMY      had a tubal pregnancy   • SALPINGOSTOMY     • TONSILLECTOMY        Social History:     Social History     Socioeconomic History   • Marital status: /Civil Union     Spouse name: None   • Number of children: None   • Years of education: None   • Highest education level: None   Occupational History   • None   Tobacco Use   • Smoking status: Some Days     Packs/day: 0.50     Years: 41.00     Total pack years: 20.50     Types: Cigarettes     Start date: 6/15/1981     Last attempt to quit: 2022     Years since quittin.4   • Smokeless tobacco: Never   Vaping Use   • Vaping Use: Every day • Start date: 6/15/2021   • Substances: Nicotine, Flavoring   Substance and Sexual Activity   • Alcohol use: Yes     Comment: OCCASIONALLY    • Drug use: No   • Sexual activity: None   Other Topics Concern   • None   Social History Narrative   • None     Social Determinants of Health     Financial Resource Strain: Not on file   Food Insecurity: Not on file   Transportation Needs: Not on file   Physical Activity: Not on file   Stress: Not on file   Social Connections: Not on file   Intimate Partner Violence: Not on file   Housing Stability: Not on file      Family History:     Family History   Problem Relation Age of Onset   • Diabetes Mother    • Fibromyalgia Mother    • Hypertension Father    • Hyperthyroidism Father    • Hypothyroidism Father    • Stroke Maternal Grandfather    • Mental illness Neg Hx       Current Medications:     Current Outpatient Medications   Medication Sig Dispense Refill   • DULoxetine (Cymbalta) 30 mg delayed release capsule Take 1 capsule (30 mg total) by mouth daily 90 capsule 1   • losartan (COZAAR) 50 mg tablet Take 1 tablet (50 mg total) by mouth in the morning 90 tablet 1   • spironolactone (ALDACTONE) 25 mg tablet Take 1 tablet (25 mg total) by mouth daily 90 tablet 1     No current facility-administered medications for this visit. Allergies:     No Known Allergies   Physical Exam:     /88   Pulse 89   Temp 97.8 °F (36.6 °C)   Resp 18   Ht 5' 2" (1.575 m)   Wt 88.5 kg (195 lb)   SpO2 95%   BMI 35.67 kg/m²     Physical Exam  Vitals and nursing note reviewed. Constitutional:       Appearance: She is well-developed. HENT:      Head: Normocephalic and atraumatic. Right Ear: External ear normal.      Left Ear: External ear normal.      Nose: Nose normal.   Cardiovascular:      Rate and Rhythm: Normal rate and regular rhythm. Heart sounds: Normal heart sounds. No murmur heard. No friction rub. Pulmonary:      Effort: No respiratory distress. Breath sounds: Normal breath sounds. No wheezing or rales. Abdominal:      Palpations: Abdomen is soft. Tenderness: There is no abdominal tenderness. Musculoskeletal:      Right lower leg: No edema. Left lower leg: No edema. Neurological:      Mental Status: She is oriented to person, place, and time. Cranial Nerves: No cranial nerve deficit.         Vision Screening    Right eye Left eye Both eyes   Without correction 20/20 20/20 20/15   With correction          [facility], [address]

## 2023-12-05 NOTE — LETTER
December 5, 2023     Patient: Stephanie Carmona  YOB: 1965  Date of Visit: 12/5/2023      To Whom it May Concern:    Stephanie Carmona is under my professional care. Her TB test was negative. Recent Results (from the past 1008 hour(s))   TB Skin Test    Collection Time: 11/10/23 11:06 AM   Result Value Ref Range    TB Skin Test Negative     Induration 0 mm       If you have any questions or concerns, please don't hesitate to call.          Sincerely,          Cr Villasenor DO        CC: No Recipients

## 2023-12-05 NOTE — TELEPHONE ENCOUNTER
Lily from [employer] [facility] called and stated they received pt's TB results but it only says collection time and negative. She needs a form to say when it was administered and what time it was read. She will also be faxing over a employee medical form and will need the doctor to fill out the second page and fax to [phone].

## 2023-12-07 ENCOUNTER — TELEPHONE (OUTPATIENT)
Age: 58
End: 2023-12-07

## 2023-12-07 NOTE — TELEPHONE ENCOUNTER
Delfina Mary. at  [facility] [address] wanted to confirm that the employee med form was received. Spoke to office staff and they stated it was given to PCP today.

## 2023-12-13 NOTE — TELEPHONE ENCOUNTER
Viraj Valdesw called to find out the status on the form. Informed her form was received and is in the Dr's possession, but has not been completed.

## 2023-12-29 ENCOUNTER — TELEPHONE (OUTPATIENT)
Age: 58
End: 2023-12-29

## 2023-12-29 NOTE — TELEPHONE ENCOUNTER
Patients employer called requesting forms that were to be filled out and TB testing. Informed that patient would need to call back. Employer not on consent sheet.

## 2024-01-03 ENCOUNTER — TELEPHONE (OUTPATIENT)
Age: 59
End: 2024-01-03

## 2024-01-03 NOTE — TELEPHONE ENCOUNTER
FYI: Pt requested office to release her physical and TB results that were done on 12/5/23.       Please release to:     [facility]  [address] [place] [address]        Lily or another associate from [facility] will call to get the info.           Thank you for your assistance.

## 2024-03-26 ENCOUNTER — CLINICAL SUPPORT (OUTPATIENT)
Dept: FAMILY MEDICINE CLINIC | Facility: CLINIC | Age: 59
End: 2024-03-26
Payer: COMMERCIAL

## 2024-03-26 DIAGNOSIS — Z11.1 PPD SCREENING TEST: Primary | ICD-10-CM

## 2024-03-26 PROCEDURE — 86580 TB INTRADERMAL TEST: CPT

## 2024-03-27 ENCOUNTER — TELEPHONE (OUTPATIENT)
Dept: FAMILY MEDICINE CLINIC | Facility: CLINIC | Age: 59
End: 2024-03-27

## 2024-03-27 NOTE — TELEPHONE ENCOUNTER
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  In clerical in basket  Rosalinda Gil, DO

## 2024-04-05 LAB
ALBUMIN SERPL-MCNC: 4.3 G/DL (ref 3.8–4.9)
ALBUMIN/GLOB SERPL: 1.4 {RATIO} (ref 1.2–2.2)
ALP SERPL-CCNC: 99 IU/L (ref 44–121)
ALT SERPL-CCNC: 20 IU/L (ref 0–32)
AST SERPL-CCNC: 15 IU/L (ref 0–40)
BILIRUB SERPL-MCNC: 0.3 MG/DL (ref 0–1.2)
BUN SERPL-MCNC: 19 MG/DL (ref 6–24)
BUN/CREAT SERPL: 24 (ref 9–23)
CALCIUM SERPL-MCNC: 9.6 MG/DL (ref 8.7–10.2)
CHLORIDE SERPL-SCNC: 102 MMOL/L (ref 96–106)
CHOLEST SERPL-MCNC: 250 MG/DL (ref 100–199)
CO2 SERPL-SCNC: 22 MMOL/L (ref 20–29)
CREAT SERPL-MCNC: 0.78 MG/DL (ref 0.57–1)
EGFR: 88 ML/MIN/1.73
ERYTHROCYTE [DISTWIDTH] IN BLOOD BY AUTOMATED COUNT: 12.9 % (ref 11.7–15.4)
GLOBULIN SER-MCNC: 3 G/DL (ref 1.5–4.5)
GLUCOSE SERPL-MCNC: 108 MG/DL (ref 70–99)
HCT VFR BLD AUTO: 46 % (ref 34–46.6)
HDLC SERPL-MCNC: 48 MG/DL
HGB BLD-MCNC: 15.4 G/DL (ref 11.1–15.9)
LDLC SERPL CALC-MCNC: 153 MG/DL (ref 0–99)
LDLC/HDLC SERPL: 3.2 RATIO (ref 0–3.2)
MCH RBC QN AUTO: 30.6 PG (ref 26.6–33)
MCHC RBC AUTO-ENTMCNC: 33.5 G/DL (ref 31.5–35.7)
MCV RBC AUTO: 91 FL (ref 79–97)
MICRODELETION SYND BLD/T FISH: NORMAL
PLATELET # BLD AUTO: 327 X10E3/UL (ref 150–450)
POTASSIUM SERPL-SCNC: 5.2 MMOL/L (ref 3.5–5.2)
PROT SERPL-MCNC: 7.3 G/DL (ref 6–8.5)
RBC # BLD AUTO: 5.04 X10E6/UL (ref 3.77–5.28)
SL AMB VLDL CHOLESTEROL CALC: 49 MG/DL (ref 5–40)
SODIUM SERPL-SCNC: 142 MMOL/L (ref 134–144)
TRIGL SERPL-MCNC: 264 MG/DL (ref 0–149)
WBC # BLD AUTO: 9.3 X10E3/UL (ref 3.4–10.8)

## 2024-11-10 ENCOUNTER — VBI (OUTPATIENT)
Dept: ADMINISTRATIVE | Facility: OTHER | Age: 59
End: 2024-11-10

## 2024-11-10 NOTE — TELEPHONE ENCOUNTER
11/10/24 8:34 AM     Chart reviewed for Pap Smear (HPV) aka Cervical Cancer Screening ; nothing is submitted to the patient's insurance at this time.     Jose Quinones MA   [facility]

## 2025-01-09 ENCOUNTER — OFFICE VISIT (OUTPATIENT)
Dept: FAMILY MEDICINE CLINIC | Facility: CLINIC | Age: 60
End: 2025-01-09
Payer: COMMERCIAL

## 2025-01-09 VITALS
TEMPERATURE: 98.3 F | DIASTOLIC BLOOD PRESSURE: 100 MMHG | HEIGHT: 62 IN | SYSTOLIC BLOOD PRESSURE: 170 MMHG | HEART RATE: 72 BPM | RESPIRATION RATE: 20 BRPM | WEIGHT: 182 LBS | BODY MASS INDEX: 33.49 KG/M2

## 2025-01-09 DIAGNOSIS — E78.00 HYPERCHOLESTEROLEMIA: ICD-10-CM

## 2025-01-09 DIAGNOSIS — I10 ESSENTIAL HYPERTENSION: Primary | ICD-10-CM

## 2025-01-09 DIAGNOSIS — K76.0 FATTY LIVER: ICD-10-CM

## 2025-01-09 DIAGNOSIS — Z12.31 SCREENING MAMMOGRAM, ENCOUNTER FOR: ICD-10-CM

## 2025-01-09 DIAGNOSIS — R73.01 IMPAIRED FASTING GLUCOSE: ICD-10-CM

## 2025-01-09 DIAGNOSIS — Z12.11 SCREENING FOR COLON CANCER: ICD-10-CM

## 2025-01-09 PROCEDURE — 99214 OFFICE O/P EST MOD 30 MIN: CPT | Performed by: FAMILY MEDICINE

## 2025-01-09 RX ORDER — AMLODIPINE BESYLATE 5 MG/1
5 TABLET ORAL DAILY
Qty: 100 TABLET | Refills: 1 | Status: SHIPPED | OUTPATIENT
Start: 2025-01-09

## 2025-01-09 RX ORDER — LOSARTAN POTASSIUM 50 MG/1
50 TABLET ORAL DAILY
Qty: 90 TABLET | Refills: 1 | Status: SHIPPED | OUTPATIENT
Start: 2025-01-09

## 2025-01-09 NOTE — ASSESSMENT & PLAN NOTE
Blood pressure is not controlled  Patient has not been taking her medication  Restarted on losartan 50 mg daily I also added amlodipine 5 mg daily  Risks of uncontrolled blood pressure discussed   Orders:  •  CBC; Future  •  Comprehensive metabolic panel; Future  •  Lipid Panel with Direct LDL reflex; Future  •  TSH, 3rd generation; Future  •  amLODIPine (NORVASC) 5 mg tablet; Take 1 tablet (5 mg total) by mouth daily  •  losartan (COZAAR) 50 mg tablet; Take 1 tablet (50 mg total) by mouth in the morning

## 2025-01-09 NOTE — PROGRESS NOTES
"Name: Darya Morin      : 1965      MRN: [id number]  Encounter Provider: Rosalinda Gil DO  Encounter Date: 2025   Encounter department: [facility]  :  Assessment & Plan  Essential hypertension  Blood pressure is not controlled  Patient has not been taking her medication  Restarted on losartan 50 mg daily I also added amlodipine 5 mg daily  Risks of uncontrolled blood pressure discussed   Orders:  •  CBC; Future  •  Comprehensive metabolic panel; Future  •  Lipid Panel with Direct LDL reflex; Future  •  TSH, 3rd generation; Future  •  amLODIPine (NORVASC) 5 mg tablet; Take 1 tablet (5 mg total) by mouth daily  •  losartan (COZAAR) 50 mg tablet; Take 1 tablet (50 mg total) by mouth in the morning    Screening for colon cancer    Orders:  •  Ambulatory referral to Gastroenterology; Future    Screening mammogram, encounter for    Orders:  •  Mammo screening bilateral w 3d and cad; Future    Fatty liver  Due for labs         Orders:  •  CBC; Future  •  Comprehensive metabolic panel; Future    Hypercholesterolemia  Due for blood work  She has lost weight  Orders:  •  Lipid Panel with Direct LDL reflex; Future    Impaired fasting glucose  Due for blood work she has lost weight  Orders:  •  Hemoglobin A1C; Future    Return in about 1 month (around 2025) for Annual physical.       History of Present Illness     She stopped all of her medication. She has lost weight and stopped her medication because she felt so good.     She is feeling hot all the time at work. She was wondering if her pressure was going up.       Review of Systems    Objective   /100   Pulse 72   Temp 98.3 °F (36.8 °C)   Resp 20   Ht 5' 2\" (1.575 m)   Wt 82.6 kg (182 lb)   BMI 33.29 kg/m²      Physical Exam  Vitals and nursing note reviewed.   Constitutional:       General: She is not in acute distress.     Appearance: She is well-developed.   HENT:      Head: Normocephalic and atraumatic.      Right Ear: Tympanic " membrane normal.      Left Ear: Tympanic membrane normal.   Cardiovascular:      Rate and Rhythm: Normal rate and regular rhythm.      Heart sounds: No murmur heard.  Pulmonary:      Effort: Pulmonary effort is normal. No respiratory distress.      Breath sounds: Normal breath sounds.   Musculoskeletal:      Cervical back: Neck supple.   Neurological:      Mental Status: She is alert.

## 2025-04-10 LAB
ALBUMIN SERPL-MCNC: 4.6 G/DL (ref 3.8–4.9)
ALP SERPL-CCNC: 91 IU/L (ref 44–121)
ALT SERPL-CCNC: 19 IU/L (ref 0–32)
AST SERPL-CCNC: 18 IU/L (ref 0–40)
BILIRUB SERPL-MCNC: 0.4 MG/DL (ref 0–1.2)
BUN SERPL-MCNC: 21 MG/DL (ref 6–24)
BUN/CREAT SERPL: 26 (ref 9–23)
CALCIUM SERPL-MCNC: 9.7 MG/DL (ref 8.7–10.2)
CHLORIDE SERPL-SCNC: 103 MMOL/L (ref 96–106)
CHOLEST SERPL-MCNC: 221 MG/DL (ref 100–199)
CO2 SERPL-SCNC: 23 MMOL/L (ref 20–29)
CREAT SERPL-MCNC: 0.82 MG/DL (ref 0.57–1)
EGFR: 82 ML/MIN/1.73
ERYTHROCYTE [DISTWIDTH] IN BLOOD BY AUTOMATED COUNT: 12.5 % (ref 11.7–15.4)
EST. AVERAGE GLUCOSE BLD GHB EST-MCNC: 111 MG/DL
GLOBULIN SER-MCNC: 2.8 G/DL (ref 1.5–4.5)
GLUCOSE SERPL-MCNC: 93 MG/DL (ref 70–99)
HBA1C MFR BLD: 5.5 % (ref 4.8–5.6)
HCT VFR BLD AUTO: 44.3 % (ref 34–46.6)
HDLC SERPL-MCNC: 46 MG/DL
HGB BLD-MCNC: 14.9 G/DL (ref 11.1–15.9)
LDLC SERPL CALC-MCNC: 142 MG/DL (ref 0–99)
LDLC/HDLC SERPL: 3.1 RATIO (ref 0–3.2)
MCH RBC QN AUTO: 29.3 PG (ref 26.6–33)
MCHC RBC AUTO-ENTMCNC: 33.6 G/DL (ref 31.5–35.7)
MCV RBC AUTO: 87 FL (ref 79–97)
MICRODELETION SYND BLD/T FISH: NORMAL
PLATELET # BLD AUTO: 361 X10E3/UL (ref 150–450)
POTASSIUM SERPL-SCNC: 4.9 MMOL/L (ref 3.5–5.2)
PROT SERPL-MCNC: 7.4 G/DL (ref 6–8.5)
RBC # BLD AUTO: 5.09 X10E6/UL (ref 3.77–5.28)
SL AMB VLDL CHOLESTEROL CALC: 33 MG/DL (ref 5–40)
SODIUM SERPL-SCNC: 140 MMOL/L (ref 134–144)
TRIGL SERPL-MCNC: 183 MG/DL (ref 0–149)
TSH SERPL DL<=0.005 MIU/L-ACNC: 1.06 UIU/ML (ref 0.45–4.5)
WBC # BLD AUTO: 6.7 X10E3/UL (ref 3.4–10.8)

## 2025-04-11 ENCOUNTER — RESULTS FOLLOW-UP (OUTPATIENT)
Dept: FAMILY MEDICINE CLINIC | Facility: CLINIC | Age: 60
End: 2025-04-11

## (undated) DEVICE — ASTOUND STANDARD SURGICAL GOWN, XL: Brand: CONVERTORS

## (undated) DEVICE — GROUNDING PAD UNIVERSAL SLW

## (undated) DEVICE — SWABSTCK, BENZOIN TINCTURE, 1/PK, STRL: Brand: APLICARE

## (undated) DEVICE — ALL PURPOSE SPONGES,NON-WOVEN, 4 PLY: Brand: CURITY

## (undated) DEVICE — BASIC DOUBLE BASIN 2-LF: Brand: MEDLINE INDUSTRIES, INC.

## (undated) DEVICE — LABEL STERILE (RSC) (2-SENSOR CAINE 2-LIDOCAINE 2-HEPARIN)

## (undated) DEVICE — SUT CHROMIC 3-0 SH 27 IN G122H

## (undated) DEVICE — CUFF TOURNIQUET 18 X 4 IN QUICK CONNECT DISP 1 BLADDER

## (undated) DEVICE — STRETCH BANDAGE: Brand: CURITY

## (undated) DEVICE — SUT ETHILON 3-0 FSL 30 IN 1671H

## (undated) DEVICE — TIBURON EXTREMITY SHEET: Brand: CONVERTORS

## (undated) DEVICE — NEEDLE 25G X 1 1/2

## (undated) DEVICE — ACE WRAP 3 IN VELCRO LATEX FREE

## (undated) DEVICE — INTENDED FOR TISSUE SEPARATION, AND OTHER PROCEDURES THAT REQUIRE A SHARP SURGICAL BLADE TO PUNCTURE OR CUT.: Brand: BARD-PARKER ® CARBON RIB-BACK BLADES

## (undated) DEVICE — SPLINT COMFORT 5 X 30

## (undated) DEVICE — SYRINGE 10ML LL CONTROL TOP

## (undated) DEVICE — PADDING CAST 4 IN  COTTON STRL

## (undated) DEVICE — DRAPE C-ARM X-RAY

## (undated) DEVICE — CURITY NON-ADHERENT STRIPS: Brand: CURITY

## (undated) DEVICE — GLOVE SRG BIOGEL 7.5

## (undated) DEVICE — ACE WRAP 4 IN STERILE

## (undated) DEVICE — GLOVE INDICATOR PI UNDERGLOVE SZ 8 BLUE

## (undated) DEVICE — CHLORAPREP HI-LITE 26ML ORANGE

## (undated) DEVICE — NEEDLE 18 G X 1 1/2

## (undated) DEVICE — 3000CC GUARDIAN II: Brand: GUARDIAN

## (undated) DEVICE — SCD SEQUENTIAL COMPRESSION COMFORT SLEEVE LARGE KNEE LENGTH: Brand: KENDALL SCD

## (undated) DEVICE — POV-IOD SOLUTION 4OZ BT

## (undated) DEVICE — SUT ETHILON 4-0 FSL 30 IN 1670H

## (undated) DEVICE — PACK GENERAL LF

## (undated) DEVICE — HALF SHEET: Brand: CONVERTORS

## (undated) DEVICE — STOCKINETTE,IMPERVIOUS,12X48,STERILE: Brand: MEDLINE